# Patient Record
Sex: FEMALE | Race: WHITE | NOT HISPANIC OR LATINO | Employment: OTHER | URBAN - METROPOLITAN AREA
[De-identification: names, ages, dates, MRNs, and addresses within clinical notes are randomized per-mention and may not be internally consistent; named-entity substitution may affect disease eponyms.]

---

## 2017-02-14 ENCOUNTER — HOSPITAL ENCOUNTER (OUTPATIENT)
Dept: MAMMOGRAPHY | Facility: CLINIC | Age: 52
Discharge: HOME | End: 2017-02-14
Attending: NURSE PRACTITIONER | Admitting: NURSE PRACTITIONER
Payer: COMMERCIAL

## 2017-02-14 DIAGNOSIS — R92.8 FOLLOW-UP EXAMINATION OF ABNORMAL MAMMOGRAM: ICD-10-CM

## 2017-02-14 PROCEDURE — G0204 DX MAMMO INCL CAD BI: HCPCS

## 2017-02-16 ENCOUNTER — OFFICE VISIT (OUTPATIENT)
Dept: FAMILY MEDICINE | Facility: CLINIC | Age: 52
End: 2017-02-16
Payer: COMMERCIAL

## 2017-02-16 VITALS
DIASTOLIC BLOOD PRESSURE: 80 MMHG | BODY MASS INDEX: 30.73 KG/M2 | RESPIRATION RATE: 16 BRPM | HEART RATE: 100 BPM | TEMPERATURE: 97.9 F | SYSTOLIC BLOOD PRESSURE: 152 MMHG | WEIGHT: 180 LBS | HEIGHT: 64 IN

## 2017-02-16 DIAGNOSIS — F17.200 TOBACCO USE DISORDER: ICD-10-CM

## 2017-02-16 DIAGNOSIS — M48.062 SPINAL STENOSIS OF LUMBAR REGION WITH NEUROGENIC CLAUDICATION: ICD-10-CM

## 2017-02-16 DIAGNOSIS — G56.01 CARPAL TUNNEL SYNDROME OF RIGHT WRIST: Primary | ICD-10-CM

## 2017-02-16 PROCEDURE — 99214 OFFICE O/P EST MOD 30 MIN: CPT | Performed by: FAMILY MEDICINE

## 2017-02-16 RX ORDER — VARENICLINE TARTRATE 1 MG/1
1 TABLET, FILM COATED ORAL 2 TIMES DAILY
Qty: 56 TABLET | Refills: 2 | Status: SHIPPED | OUTPATIENT
Start: 2017-02-16 | End: 2018-06-09

## 2017-02-16 RX ORDER — OXYCODONE AND ACETAMINOPHEN 5; 325 MG/1; MG/1
TABLET ORAL
Qty: 60 TABLET | Refills: 0 | Status: ON HOLD | OUTPATIENT
Start: 2017-02-16 | End: 2017-04-11

## 2017-02-16 RX ORDER — AMOXICILLIN 500 MG/1
500 TABLET, FILM COATED ORAL 3 TIMES DAILY
COMMUNITY
End: 2017-03-29

## 2017-02-16 NOTE — PROGRESS NOTES
"  SUBJECTIVE:                                                    Shelley Lew is a 52 year old female who presents to clinic today for the following health issues:       Back Pain      Duration: Ongiong    Description (location/character/radiation): low back, pain radiating down left leg    Intensity:  6-7/10    Accompanying signs and symptoms: None    History (similar episodes/previous evaluation): MRI, previous eval    Precipitating or alleviating factors: None    Therapies tried and outcome: Tylenol     Carpal Tunnel Syndrome - Possible referral, had surgery on left wrist in 2013    S; Shelley Lew is a 52 year old female with R carpal tunnel.  Confirmed on previous EMG.  Sx worse.  Would like referral for surgery.  Did L hand, very happy with result.      Chronic back pain: worsening.  Limiting.  Hunched when walking.  Pain worse down L leg.  No acute change, just worsening over time.  MRI with many findings last year, severe spinal stenosis, etc.  Wants to talk with spine surgeon about options.  \"I can't do therapy, too much pain.     Tylenol helps a little, not a lot.  Would like temporary handicap sticker until she can figure out something to help her back more     Tobacco : wants to quit.  chantix is her hope    Problem list, med list, additional histories reviewed and updated, as indicated.    No bowel/bladder issues      O:/80 (BP Location: Right arm, Patient Position: Chair, Cuff Size: Adult Regular)  Pulse 100  Temp 97.9  F (36.6  C) (Tympanic)  Resp 16  Ht 5' 4\" (1.626 m)  Wt 180 lb (81.6 kg)  Breastfeeding? No  BMI 30.9 kg/m2  GEN: Alert and oriented, nad  Walks hunched over, slow  L leg with decreased reflex at knee  Strength down some too    A: spinal stenosis, severe       Back pain, ongoing       Carpal tunnel ,worsening       Tobacco use    P: spine surg.  Hand ortho.  6 mo handicap sticker  Chantix, discussed use  60 percocet for night use mostly.  Unclear long term plan on this.  " Told her dont' want to fill frequently, less is better, etc.

## 2017-02-16 NOTE — NURSING NOTE
"Chief Complaint   Patient presents with     Cts     Back Pain       Initial /80 (BP Location: Right arm, Patient Position: Chair, Cuff Size: Adult Regular)  Pulse 100  Temp 97.9  F (36.6  C) (Tympanic)  Resp 16  Ht 5' 4\" (1.626 m)  Wt 180 lb (81.6 kg)  Breastfeeding? No  BMI 30.9 kg/m2 Estimated body mass index is 30.9 kg/(m^2) as calculated from the following:    Height as of this encounter: 5' 4\" (1.626 m).    Weight as of this encounter: 180 lb (81.6 kg).  Medication Reconciliation: complete    Health Maintenance that is potentially due pending provider review:  Colonoscopy/FIT and Lipids    Gave pt phone number/pended order to schedule mammo and/or colonoscopy(or FIT)      "

## 2017-02-16 NOTE — MR AVS SNAPSHOT
After Visit Summary   2/16/2017    Shelley Lew    MRN: [id number]           Patient Information     Date Of Birth          1965        Visit Information        Provider Department      2/16/2017 1:20 PM Seth Pollard MD [facility]        Today's Diagnoses     Carpal tunnel syndrome of right wrist    -  1    Spinal stenosis of lumbar region with neurogenic claudication        Tobacco use disorder           Follow-ups after your visit        Additional Services     ORTHOPEDICS ADULT REFERRAL       Your provider has referred you to: JESSICA Aviles [phone]   [email or web address]    Please be aware that coverage of these services is subject to the terms and limitations of your health insurance plan.  Call member services at your health plan with any benefit or coverage questions.      Please bring the following to your appointment:    >>   Any x-rays, CTs or MRIs which have been performed.  Contact the facility where they were done to arrange for  prior to your scheduled appointment.    >>   List of current medications   >>   This referral request   >>   Any documents/labs given to you for this referral            SPINE SURGERY REFERRAL       Please choose [facility]ist (unless patient was seen by a Medical Spine Specialist within the past 6 months).  Surgical Evaluation is advised if the patient presents with one or more of the following red flags:     **Cauda Equina Syndrome  **Evidence of Spinal Tumor  **Fracture  **Infection  **Loss of Bowel or Bladder Control  **Sudden or Progressive Weakness  **Any other documented emergent neurological condition resulting from a Lumbar Spinal Condition.    You have been referred to: Spine Lumbar: Spine Surgeon: JESSICA Aviles [phone]   [email or web address]    Please be aware that coverage of  "these services is subject to the terms and limitations of your health insurance plan.  Call member services at your health plan with any benefit or coverage questions.      Please bring the following to your appointment:    **Any x-rays, CTs or MRIs which have been performed.  Contact the facility where they were done to arrange for  prior to your scheduled appointment.    **List of current medications   **This referral request   **Any documents/labs given to you regarding this referral                  Who to contact     If you have questions or need follow up information about today's clinic visit or your schedule please contact [facility] directly at [phone].  Normal or non-critical lab and imaging results will be communicated to you by MyChart, letter or phone within 4 business days after the clinic has received the results. If you do not hear from us within 7 days, please contact the clinic through FITiSThart or phone. If you have a critical or abnormal lab result, we will notify you by phone as soon as possible.  Submit refill requests through Ogone or call your pharmacy and they will forward the refill request to us. Please allow 3 business days for your refill to be completed.          Additional Information About Your Visit        FITiSTharModusly Information     Ogone gives you secure access to your electronic health record. If you see a primary care provider, you can also send messages to your care team and make appointments. If you have questions, please call your primary care clinic.  If you do not have a primary care provider, please call [phone] and they will assist you.        Care EveryWhere ID     This is your Care EveryWhere ID. This could be used by other organizations to access your [place] medical records  [id number]        Your Vitals Were     Pulse Temperature Respirations Height Breastfeeding? BMI (Body Mass Index)    100 97.9  F (36.6  C) (Tympanic) 16 5' 4\" " (1.626 m) No 30.9 kg/m2       Blood Pressure from Last 3 Encounters:   02/16/17 152/80   08/01/16 (!) 130/98   05/03/16 132/85    Weight from Last 3 Encounters:   02/16/17 180 lb (81.6 kg)   08/01/16 176 lb (79.8 kg)   05/03/16 181 lb (82.1 kg)              We Performed the Following     ORTHOPEDICS ADULT REFERRAL     SPINE SURGERY REFERRAL          Today's Medication Changes          These changes are accurate as of: 2/16/17  2:16 PM.  If you have any questions, ask your nurse or doctor.               Start taking these medicines.        Dose/Directions    oxyCODONE-acetaminophen 5-325 MG per tablet   Commonly known as:  PERCOCET   Used for:  Spinal stenosis of lumbar region with neurogenic claudication   Started by:  Seth Pollard MD        1-2 tabs at night for back pain   Quantity:  60 tablet   Refills:  0       * varenicline 0.5 MG X 11 & 1 MG X 42 tablet   Commonly known as:  CHANTIX STARTING MONTH SANJU   Used for:  Tobacco use disorder   Started by:  Seth Pollard MD        Take 0.5 mg tab daily for 3 days, then 0.5 mg tab twice daily for 4 days, then 1 mg twice daily.   Quantity:  53 tablet   Refills:  0       * varenicline 1 MG tablet   Commonly known as:  CHANTIX   Used for:  Tobacco use disorder   Started by:  Seth Pollard MD        Dose:  1 mg   Take 1 tablet (1 mg) by mouth 2 times daily   Quantity:  56 tablet   Refills:  2       * Notice:  This list has 2 medication(s) that are the same as other medications prescribed for you. Read the directions carefully, and ask your doctor or other care provider to review them with you.         Where to get your medicines      These medications were sent to [facility] [address] [place] [address] - [address] [facility] [address]  [address] [place] [address]     Phone:  [phone]     varenicline 0.5 MG X 11 & 1 MG X 42 tablet    varenicline 1 MG tablet         Some of these will need a paper prescription and others can be  bought over the counter.  Ask your nurse if you have questions.     Bring a paper prescription for each of these medications     oxyCODONE-acetaminophen 5-325 MG per tablet                Primary Care Provider Office Phone # Fax #    Seth Pollard [phone]       [facility] [address]  [facility] [address]        Thank you!     Thank you for choosing [facility]  for your care. Our goal is always to provide you with excellent care. Hearing back from our patients is one way we can continue to improve our services. Please take a few minutes to complete the written survey that you may receive in the mail after your visit with us. Thank you!             Your Updated Medication List - Protect others around you: Learn how to safely use, store and throw away your medicines at www.disposemymeds.org.          This list is accurate as of: 2/16/17  2:16 PM.  Always use your most recent med list.                   Brand Name Dispense Instructions for use    amoxicillin 500 MG tablet    AMOXIL     Take 500 mg by mouth 3 times daily       Multi-vitamin Tabs tablet      Take 1 tablet by mouth daily Reported on 2/16/2017       naproxen 500 MG tablet    NAPROSYN    60 tablet    Take 1 tablet (500 mg) by mouth 2 times daily as needed for moderate pain       oxyCODONE-acetaminophen 5-325 MG per tablet    PERCOCET    60 tablet    1-2 tabs at night for back pain       * varenicline 0.5 MG X 11 & 1 MG X 42 tablet    CHANTIX STARTING MONTH SANJU    53 tablet    Take 0.5 mg tab daily for 3 days, then 0.5 mg tab twice daily for 4 days, then 1 mg twice daily.       * varenicline 1 MG tablet    CHANTIX    56 tablet    Take 1 tablet (1 mg) by mouth 2 times daily       * Notice:  This list has 2 medication(s) that are the same as other medications prescribed for you. Read the directions carefully, and ask your doctor or other care provider to review them with you.

## 2017-03-07 ENCOUNTER — HOSPITAL ENCOUNTER (OUTPATIENT)
Dept: MRI IMAGING | Facility: CLINIC | Age: 52
Discharge: HOME | End: 2017-03-07
Attending: ORTHOPAEDIC SURGERY | Admitting: ORTHOPAEDIC SURGERY
Payer: COMMERCIAL

## 2017-03-07 DIAGNOSIS — M54.10 RADICULOPATHY, UNSPECIFIED SPINAL REGION: ICD-10-CM

## 2017-03-07 PROCEDURE — 72148 MRI LUMBAR SPINE W/O DYE: CPT

## 2017-03-20 ENCOUNTER — TRANSFERRED RECORDS (OUTPATIENT)
Dept: HEALTH INFORMATION MANAGEMENT | Facility: CLINIC | Age: 52
End: 2017-03-20

## 2017-03-29 ENCOUNTER — OFFICE VISIT (OUTPATIENT)
Dept: FAMILY MEDICINE | Facility: CLINIC | Age: 52
End: 2017-03-29
Payer: COMMERCIAL

## 2017-03-29 VITALS
HEART RATE: 100 BPM | WEIGHT: 183 LBS | HEIGHT: 64 IN | SYSTOLIC BLOOD PRESSURE: 134 MMHG | BODY MASS INDEX: 31.24 KG/M2 | TEMPERATURE: 97.6 F | DIASTOLIC BLOOD PRESSURE: 88 MMHG | RESPIRATION RATE: 16 BRPM

## 2017-03-29 DIAGNOSIS — F17.200 TOBACCO USE DISORDER: ICD-10-CM

## 2017-03-29 DIAGNOSIS — Z01.818 PREOP GENERAL PHYSICAL EXAM: Primary | ICD-10-CM

## 2017-03-29 DIAGNOSIS — M48.062 SPINAL STENOSIS OF LUMBAR REGION WITH NEUROGENIC CLAUDICATION: ICD-10-CM

## 2017-03-29 PROCEDURE — 99214 OFFICE O/P EST MOD 30 MIN: CPT | Performed by: FAMILY MEDICINE

## 2017-03-29 PROCEDURE — 93000 ELECTROCARDIOGRAM COMPLETE: CPT | Performed by: FAMILY MEDICINE

## 2017-03-29 NOTE — MR AVS SNAPSHOT
After Visit Summary   3/29/2017    Shelley Lew    MRN: [id number]           Patient Information     Date Of Birth          1965        Visit Information        Provider Department      3/29/2017 2:40 PM Seth Pollard MD [facility]        Today's Diagnoses     Preop general physical exam    -  1    Tobacco use disorder        Spinal stenosis of lumbar region with neurogenic claudication          Care Instructions      Before Your Surgery      Call your surgeon if there is any change in your health. This includes signs of a cold or flu (such as a sore throat, runny nose, cough, rash or fever).    Do not smoke, drink alcohol or take over the counter medicine (unless your surgeon or primary care doctor tells you to) for the 24 hours before and after surgery.    If you take prescribed drugs: Follow your doctor s orders about which medicines to take and which to stop until after surgery.    Eating and drinking prior to surgery: follow the instructions from your surgeon    Take a shower or bath the night before surgery. Use the soap your surgeon gave you to gently clean your skin. If you do not have soap from your surgeon, use your regular soap. Do not shave or scrub the surgery site.  Wear clean pajamas and have clean sheets on your bed.         Follow-ups after your visit        Who to contact     If you have questions or need follow up information about today's clinic visit or your schedule please contact [facility] directly at [phone].  Normal or non-critical lab and imaging results will be communicated to you by MyChart, letter or phone within 4 business days after the clinic has received the results. If you do not hear from us within 7 days, please contact the clinic through TOMODOhart or phone. If you have a critical or abnormal lab result, we will notify you by phone as soon as possible.  Submit refill requests through True Office or call your pharmacy and they  "will forward the refill request to us. Please allow 3 business days for your refill to be completed.          Additional Information About Your Visit        Produce RunharRentWiki Information     [employer]bon gives you secure access to your electronic health record. If you see a primary care provider, you can also send messages to your care team and make appointments. If you have questions, please call your primary care clinic.  If you do not have a primary care provider, please call [phone] and they will assist you.        Care EveryWhere ID     This is your Care EveryWhere ID. This could be used by other organizations to access your [place] medical records  [id number]        Your Vitals Were     Pulse Temperature Respirations Height Breastfeeding? BMI (Body Mass Index)    100 97.6  F (36.4  C) (Tympanic) 16 5' 4\" (1.626 m) No 31.41 kg/m2       Blood Pressure from Last 3 Encounters:   03/29/17 134/88   02/16/17 152/80   08/01/16 (!) 130/98    Weight from Last 3 Encounters:   03/29/17 183 lb (83 kg)   02/16/17 180 lb (81.6 kg)   08/01/16 176 lb (79.8 kg)              We Performed the Following     EKG 12-lead complete w/read - Clinics        Primary Care Provider Office Phone # Fax #    Seth Pollard [phone]       [facility] [address] [facility] [address]        Thank you!     Thank you for choosing [facility]  for your care. Our goal is always to provide you with excellent care. Hearing back from our patients is one way we can continue to improve our services. Please take a few minutes to complete the written survey that you may receive in the mail after your visit with us. Thank you!             Your Updated Medication List - Protect others around you: Learn how to safely use, store and throw away your medicines at www.disposemymeds.org.          This list is accurate as of: 3/29/17  3:53 PM.  Always use your most recent med list.                   Brand Name " Dispense Instructions for use    Multi-vitamin Tabs tablet      Take 1 tablet by mouth daily Reported on 2/16/2017       naproxen 500 MG tablet    NAPROSYN    60 tablet    Take 1 tablet (500 mg) by mouth 2 times daily as needed for moderate pain       oxyCODONE-acetaminophen 5-325 MG per tablet    PERCOCET    60 tablet    1-2 tabs at night for back pain       UNKNOWN TO PATIENT      Steroid from back specialist.       * varenicline 0.5 MG X 11 & 1 MG X 42 tablet    CHANTIX STARTING MONTH SANJU    53 tablet    Take 0.5 mg tab daily for 3 days, then 0.5 mg tab twice daily for 4 days, then 1 mg twice daily.       * varenicline 1 MG tablet    CHANTIX    56 tablet    Take 1 tablet (1 mg) by mouth 2 times daily       * Notice:  This list has 2 medication(s) that are the same as other medications prescribed for you. Read the directions carefully, and ask your doctor or other care provider to review them with you.

## 2017-03-29 NOTE — PROGRESS NOTES
[facility]  [address] [facility] [address]  [phone]  Dept: [phone]    PRE-OP EVALUATION:  Today's date: 3/29/2017    Shelley Lew (: 1965) presents for pre-operative evaluation assessment as requested by Dr. Bangura.  She requires evaluation and anesthesia risk assessment prior to undergoing surgery/procedure for treatment of lbp, sciatica .  Proposed procedure: tbd    Date of Surgery/ Procedure: TBD  Time of Surgery/ Procedure: TBD  Hospital/Surgical Facility: [place]  Fax number for surgical facility:   Primary Physician: Seth Pollard  Type of Anesthesia Anticipated: to be determined    Patient has a Health Care Directive or Living Will:  NO    1. NO - Do you have a history of heart attack, stroke, stent, bypass or surgery on an artery in the head, neck, heart or legs?  2. NO - Do you ever have any pain or discomfort in your chest?  3. NO - Do you have a history of  Heart Failure?  4. NO - Are you troubled by shortness of breath when: walking on the level, up a slight hill or at night?  5. NO - Do you currently have a cold, bronchitis or other respiratory infection?  6. YES - DO YOU HAVE A COUGH, SHORTNESS OF BREATH OR WHEEZING? Hx of smoking   7. NO - Do you sometimes get pains in the calves of your legs when you walk?  8. NO - Do you or anyone in your family have previous history of blood clots?  9. NO - Do you or does anyone in your family have a serious bleeding problem such as prolonged bleeding following surgeries or cuts?  10. NO - Have you ever had problems with anemia or been told to take iron pills?  11. NO - Have you had any abnormal blood loss such as black, tarry or bloody stools, or abnormal vaginal bleeding?  12. NO - Have you ever had a blood transfusion?  13. NO - Have you or any of your relatives ever had problems with anesthesia?  14. NO - Do you have sleep apnea, excessive snoring or daytime drowsiness?  15. NO - Do you have any prosthetic heart  valves?  16. NO - Do you have prosthetic joints?  17. NO - Is there any chance that you may be pregnant?      HPI:                                                      Brief HPI related to upcoming procedure: chronic back pain.  Having same day surgery on L4 per her report.  Unsure of exact date or name of procedure      Tobacco :only smoking one cigarette a day right now.  On chantix.  Close to completely quitting.      MEDICAL HISTORY:                                                      Patient Active Problem List    Diagnosis Date Noted     Spinal stenosis of lumbar region with neurogenic claudication 02/16/2017     Priority: Medium     Spine surg referral.  Many MRI findings.  Severely limited by this.  60 percocet given 2/16/17 visit for help with sleep, unclear on long term plan at this time.         Tobacco use disorder 02/16/2017     Priority: Medium     Health Care Home 03/31/2016     Priority: Medium       Status:  Unable to reach  Care Coordinator:  Nabila Coffman    See Letters for [facility] Care Plan  Date:  March 31, 2016           CTS (carpal tunnel syndrome) 05/15/2013     Priority: Medium     CARDIOVASCULAR SCREENING; LDL GOAL LESS THAN 160 10/31/2010     Priority: Medium        Past Surgical History:   Procedure Laterality Date     CHOLECYSTECTOMY       LAPAROSCOPY,TUBAL CAUTERY       RELEASE CARPAL TUNNEL  5/21/2013    Procedure: RELEASE CARPAL TUNNEL;  Left carpal tunnel release  ;  Surgeon: Yovani Gonzalez MD;  Location: [facility] OR     Current Outpatient Prescriptions   Medication Sig Dispense Refill     amoxicillin (AMOXIL) 500 MG tablet Take 500 mg by mouth 3 times daily       oxyCODONE-acetaminophen (PERCOCET) 5-325 MG per tablet 1-2 tabs at night for back pain 60 tablet 0     varenicline (CHANTIX STARTING MONTH PAK) 0.5 MG X 11 & 1 MG X 42 tablet Take 0.5 mg tab daily for 3 days, then 0.5 mg tab twice daily for 4 days, then 1 mg twice daily. 53 tablet 0     varenicline (CHANTIX) 1 MG tablet Take  "1 tablet (1 mg) by mouth 2 times daily 56 tablet 2     naproxen (NAPROSYN) 500 MG tablet Take 1 tablet (500 mg) by mouth 2 times daily as needed for moderate pain (Patient not taking: Reported on 2/16/2017) 60 tablet 1     multivitamin, therapeutic with minerals (MULTI-VITAMIN) TABS Take 1 tablet by mouth daily Reported on 2/16/2017       OTC products: None, except as noted above    Allergies   Allergen Reactions     Nkda [No Known Drug Allergies]       Latex Allergy: NO    Social History   Substance Use Topics     Smoking status: Current Every Day Smoker     Smokeless tobacco: Never Used      Comment: one pack lasts a week     Alcohol use Yes      Comment: OCC.     History   Drug Use No       REVIEW OF SYSTEMS:                                                    No cp or new sob.  No fever.  No gi/gu concerns.  No rash.        EXAM:                                                    /88 (BP Location: Right arm, Cuff Size: Adult Regular)  Pulse 100  Temp 97.6  F (36.4  C) (Tympanic)  Resp 16  Ht 5' 4\" (1.626 m)  Wt 183 lb (83 kg)  Breastfeeding? No  BMI 31.41 kg/m2  Gen: alert and oriented, in no acute distress, affect within normal limits  Neck: supple with no masses or nodes  Throat: oropharynx clear, no exudate or tonsillar/palate asymmetry.    CV: RRR, no murmur  Lungs: clear bilaterally with good effort  Abd: nontender, no mass  Ext: no edema or lesions   Neuro: moving all extremities, gait normal, no focal deficts noted      DIAGNOSTICS:                                                    EKG: NSR, no ST or T wave changes.  Normal axis, intervals.  No Q waves.         IMPRESSION:                                                    Pre op  Lumbar radicular pain  Mild tobacco use    Proceed.      The proposed surgical procedure is considered INTERMEDIATE risk.    REVISED CARDIAC RISK INDEX  The patient has the following serious cardiovascular risks for perioperative complications such as (MI, PE, VFib " and 3  AV Block):  No serious cardiac risks  INTERPRETATION: 0 risks: Class I (very low risk - 0.4% complication rate)    The patient has the following additional risks for perioperative complications:  No identified additional risks    No diagnosis found.    RECOMMENDATIONS:                                                          APPROVAL GIVEN to proceed with proposed procedure, without further diagnostic evaluation       Signed Electronically by: Seth Pollard MD

## 2017-03-29 NOTE — NURSING NOTE
"Chief Complaint   Patient presents with     Pre-Op Exam       Initial /88 (BP Location: Right arm, Cuff Size: Adult Regular)  Pulse 100  Temp 97.6  F (36.4  C) (Tympanic)  Resp 16  Ht 5' 4\" (1.626 m)  Wt 183 lb (83 kg)  Breastfeeding? No  BMI 31.41 kg/m2 Estimated body mass index is 31.41 kg/(m^2) as calculated from the following:    Height as of this encounter: 5' 4\" (1.626 m).    Weight as of this encounter: 183 lb (83 kg).  Medication Reconciliation: complete    Health Maintenance that is potentially due pending provider review:  Colonoscopy/FIT    Pt will schedule  appt.      "

## 2017-04-10 ENCOUNTER — ANESTHESIA EVENT (OUTPATIENT)
Dept: SURGERY | Facility: CLINIC | Age: 52
End: 2017-04-10
Payer: COMMERCIAL

## 2017-04-11 ENCOUNTER — APPOINTMENT (OUTPATIENT)
Dept: GENERAL RADIOLOGY | Facility: CLINIC | Age: 52
End: 2017-04-11
Attending: ORTHOPAEDIC SURGERY
Payer: COMMERCIAL

## 2017-04-11 ENCOUNTER — HOSPITAL ENCOUNTER (OUTPATIENT)
Facility: CLINIC | Age: 52
Discharge: HOME | End: 2017-04-11
Attending: ORTHOPAEDIC SURGERY | Admitting: ORTHOPAEDIC SURGERY
Payer: COMMERCIAL

## 2017-04-11 ENCOUNTER — ANESTHESIA (OUTPATIENT)
Dept: SURGERY | Facility: CLINIC | Age: 52
End: 2017-04-11
Payer: COMMERCIAL

## 2017-04-11 ENCOUNTER — SURGERY (OUTPATIENT)
Age: 52
End: 2017-04-11

## 2017-04-11 VITALS
DIASTOLIC BLOOD PRESSURE: 68 MMHG | OXYGEN SATURATION: 95 % | RESPIRATION RATE: 16 BRPM | WEIGHT: 183 LBS | TEMPERATURE: 96.8 F | HEIGHT: 64 IN | BODY MASS INDEX: 31.24 KG/M2 | SYSTOLIC BLOOD PRESSURE: 104 MMHG

## 2017-04-11 DIAGNOSIS — M48.062 SPINAL STENOSIS OF LUMBAR REGION WITH NEUROGENIC CLAUDICATION: Primary | ICD-10-CM

## 2017-04-11 LAB — HCG UR QL: NEGATIVE

## 2017-04-11 PROCEDURE — 37000008 ZZH ANESTHESIA TECHNICAL FEE, 1ST 30 MIN: Performed by: ORTHOPAEDIC SURGERY

## 2017-04-11 PROCEDURE — 37000009 ZZH ANESTHESIA TECHNICAL FEE, EACH ADDTL 15 MIN: Performed by: ORTHOPAEDIC SURGERY

## 2017-04-11 PROCEDURE — 27210794 ZZH OR GENERAL SUPPLY STERILE: Performed by: ORTHOPAEDIC SURGERY

## 2017-04-11 PROCEDURE — 40000940 XR LUMBAR SPINE PORT 1 VW

## 2017-04-11 PROCEDURE — 71000013 ZZH RECOVERY PHASE 1 LEVEL 1 EA ADDTL HR: Performed by: ORTHOPAEDIC SURGERY

## 2017-04-11 PROCEDURE — 25000125 ZZHC RX 250: Performed by: NURSE ANESTHETIST, CERTIFIED REGISTERED

## 2017-04-11 PROCEDURE — 81025 URINE PREGNANCY TEST: CPT | Performed by: NURSE ANESTHETIST, CERTIFIED REGISTERED

## 2017-04-11 PROCEDURE — 25800025 ZZH RX 258: Performed by: NURSE ANESTHETIST, CERTIFIED REGISTERED

## 2017-04-11 PROCEDURE — 36000063 ZZH SURGERY LEVEL 4 EA 15 ADDTL MIN: Performed by: ORTHOPAEDIC SURGERY

## 2017-04-11 PROCEDURE — 40000306 ZZH STATISTIC PRE PROC ASSESS II: Performed by: ORTHOPAEDIC SURGERY

## 2017-04-11 PROCEDURE — 36000093 ZZH SURGERY LEVEL 4 1ST 30 MIN: Performed by: ORTHOPAEDIC SURGERY

## 2017-04-11 PROCEDURE — 71000012 ZZH RECOVERY PHASE 1 LEVEL 1 FIRST HR: Performed by: ORTHOPAEDIC SURGERY

## 2017-04-11 PROCEDURE — 71000027 ZZH RECOVERY PHASE 2 EACH 15 MINS: Performed by: ORTHOPAEDIC SURGERY

## 2017-04-11 PROCEDURE — 25000125 ZZHC RX 250: Performed by: ORTHOPAEDIC SURGERY

## 2017-04-11 PROCEDURE — 27110028 ZZH OR GENERAL SUPPLY NON-STERILE: Performed by: ORTHOPAEDIC SURGERY

## 2017-04-11 PROCEDURE — 25000128 H RX IP 250 OP 636: Performed by: PHYSICIAN ASSISTANT

## 2017-04-11 PROCEDURE — 25000566 ZZH SEVOFLURANE, EA 15 MIN: Performed by: ORTHOPAEDIC SURGERY

## 2017-04-11 PROCEDURE — 25000128 H RX IP 250 OP 636: Performed by: NURSE ANESTHETIST, CERTIFIED REGISTERED

## 2017-04-11 RX ORDER — SODIUM CHLORIDE, SODIUM LACTATE, POTASSIUM CHLORIDE, CALCIUM CHLORIDE 600; 310; 30; 20 MG/100ML; MG/100ML; MG/100ML; MG/100ML
INJECTION, SOLUTION INTRAVENOUS CONTINUOUS
Status: DISCONTINUED | OUTPATIENT
Start: 2017-04-11 | End: 2017-04-11 | Stop reason: HOSPADM

## 2017-04-11 RX ORDER — FENTANYL CITRATE 50 UG/ML
INJECTION, SOLUTION INTRAMUSCULAR; INTRAVENOUS PRN
Status: DISCONTINUED | OUTPATIENT
Start: 2017-04-11 | End: 2017-04-11

## 2017-04-11 RX ORDER — LIDOCAINE 40 MG/G
CREAM TOPICAL
Status: DISCONTINUED | OUTPATIENT
Start: 2017-04-11 | End: 2017-04-11 | Stop reason: HOSPADM

## 2017-04-11 RX ORDER — METOCLOPRAMIDE HYDROCHLORIDE 5 MG/ML
10 INJECTION INTRAMUSCULAR; INTRAVENOUS EVERY 6 HOURS PRN
Status: DISCONTINUED | OUTPATIENT
Start: 2017-04-11 | End: 2017-04-11 | Stop reason: HOSPADM

## 2017-04-11 RX ORDER — ONDANSETRON 4 MG/1
4 TABLET, ORALLY DISINTEGRATING ORAL EVERY 30 MIN PRN
Status: DISCONTINUED | OUTPATIENT
Start: 2017-04-11 | End: 2017-04-11 | Stop reason: HOSPADM

## 2017-04-11 RX ORDER — KETOROLAC TROMETHAMINE 30 MG/ML
INJECTION, SOLUTION INTRAMUSCULAR; INTRAVENOUS PRN
Status: DISCONTINUED | OUTPATIENT
Start: 2017-04-11 | End: 2017-04-11

## 2017-04-11 RX ORDER — GLYCOPYRROLATE 0.2 MG/ML
INJECTION, SOLUTION INTRAMUSCULAR; INTRAVENOUS PRN
Status: DISCONTINUED | OUTPATIENT
Start: 2017-04-11 | End: 2017-04-11

## 2017-04-11 RX ORDER — FENTANYL CITRATE 50 UG/ML
25-50 INJECTION, SOLUTION INTRAMUSCULAR; INTRAVENOUS
Status: DISCONTINUED | OUTPATIENT
Start: 2017-04-11 | End: 2017-04-11 | Stop reason: HOSPADM

## 2017-04-11 RX ORDER — TRAMADOL HYDROCHLORIDE 50 MG/1
50-100 TABLET ORAL EVERY 6 HOURS PRN
Qty: 20 TABLET | Refills: 0 | Status: SHIPPED | OUTPATIENT
Start: 2017-04-11 | End: 2017-07-06

## 2017-04-11 RX ORDER — KETOROLAC TROMETHAMINE 30 MG/ML
30 INJECTION, SOLUTION INTRAMUSCULAR; INTRAVENOUS EVERY 6 HOURS PRN
Status: DISCONTINUED | OUTPATIENT
Start: 2017-04-11 | End: 2017-04-11 | Stop reason: HOSPADM

## 2017-04-11 RX ORDER — HYDROMORPHONE HYDROCHLORIDE 1 MG/ML
.3-.5 INJECTION, SOLUTION INTRAMUSCULAR; INTRAVENOUS; SUBCUTANEOUS EVERY 10 MIN PRN
Status: DISCONTINUED | OUTPATIENT
Start: 2017-04-11 | End: 2017-04-11 | Stop reason: HOSPADM

## 2017-04-11 RX ORDER — MEPERIDINE HYDROCHLORIDE 25 MG/ML
12.5 INJECTION INTRAMUSCULAR; INTRAVENOUS; SUBCUTANEOUS
Status: DISCONTINUED | OUTPATIENT
Start: 2017-04-11 | End: 2017-04-11 | Stop reason: HOSPADM

## 2017-04-11 RX ORDER — PROPOFOL 10 MG/ML
INJECTION, EMULSION INTRAVENOUS PRN
Status: DISCONTINUED | OUTPATIENT
Start: 2017-04-11 | End: 2017-04-11

## 2017-04-11 RX ORDER — NEOSTIGMINE METHYLSULFATE 1 MG/ML
VIAL (ML) INJECTION PRN
Status: DISCONTINUED | OUTPATIENT
Start: 2017-04-11 | End: 2017-04-11

## 2017-04-11 RX ORDER — LIDOCAINE HYDROCHLORIDE 10 MG/ML
INJECTION, SOLUTION INFILTRATION; PERINEURAL PRN
Status: DISCONTINUED | OUTPATIENT
Start: 2017-04-11 | End: 2017-04-11

## 2017-04-11 RX ORDER — ONDANSETRON 2 MG/ML
4 INJECTION INTRAMUSCULAR; INTRAVENOUS EVERY 30 MIN PRN
Status: DISCONTINUED | OUTPATIENT
Start: 2017-04-11 | End: 2017-04-11 | Stop reason: HOSPADM

## 2017-04-11 RX ORDER — ALBUTEROL SULFATE 0.83 MG/ML
2.5 SOLUTION RESPIRATORY (INHALATION) EVERY 4 HOURS PRN
Status: DISCONTINUED | OUTPATIENT
Start: 2017-04-11 | End: 2017-04-11 | Stop reason: HOSPADM

## 2017-04-11 RX ORDER — CEFAZOLIN SODIUM 1 G/3ML
1 INJECTION, POWDER, FOR SOLUTION INTRAMUSCULAR; INTRAVENOUS SEE ADMIN INSTRUCTIONS
Status: DISCONTINUED | OUTPATIENT
Start: 2017-04-11 | End: 2017-04-11 | Stop reason: HOSPADM

## 2017-04-11 RX ORDER — CEFAZOLIN SODIUM 2 G/100ML
2 INJECTION, SOLUTION INTRAVENOUS
Status: COMPLETED | OUTPATIENT
Start: 2017-04-11 | End: 2017-04-11

## 2017-04-11 RX ORDER — DEXAMETHASONE SODIUM PHOSPHATE 4 MG/ML
INJECTION, SOLUTION INTRA-ARTICULAR; INTRALESIONAL; INTRAMUSCULAR; INTRAVENOUS; SOFT TISSUE PRN
Status: DISCONTINUED | OUTPATIENT
Start: 2017-04-11 | End: 2017-04-11

## 2017-04-11 RX ORDER — NALOXONE HYDROCHLORIDE 0.4 MG/ML
.1-.4 INJECTION, SOLUTION INTRAMUSCULAR; INTRAVENOUS; SUBCUTANEOUS
Status: DISCONTINUED | OUTPATIENT
Start: 2017-04-11 | End: 2017-04-11 | Stop reason: HOSPADM

## 2017-04-11 RX ORDER — METOCLOPRAMIDE 10 MG/1
10 TABLET ORAL EVERY 6 HOURS PRN
Status: DISCONTINUED | OUTPATIENT
Start: 2017-04-11 | End: 2017-04-11 | Stop reason: HOSPADM

## 2017-04-11 RX ORDER — ONDANSETRON 2 MG/ML
INJECTION INTRAMUSCULAR; INTRAVENOUS PRN
Status: DISCONTINUED | OUTPATIENT
Start: 2017-04-11 | End: 2017-04-11

## 2017-04-11 RX ORDER — BUPIVACAINE HYDROCHLORIDE AND EPINEPHRINE 2.5; 5 MG/ML; UG/ML
INJECTION, SOLUTION INFILTRATION; PERINEURAL PRN
Status: DISCONTINUED | OUTPATIENT
Start: 2017-04-11 | End: 2017-04-11 | Stop reason: HOSPADM

## 2017-04-11 RX ADMIN — MIDAZOLAM 1 MG: 1 INJECTION INTRAMUSCULAR; INTRAVENOUS at 12:52

## 2017-04-11 RX ADMIN — KETOROLAC TROMETHAMINE 30 MG: 30 INJECTION, SOLUTION INTRAMUSCULAR at 11:36

## 2017-04-11 RX ADMIN — GLYCOPYRROLATE 0.6 MG: 0.2 INJECTION, SOLUTION INTRAMUSCULAR; INTRAVENOUS at 11:12

## 2017-04-11 RX ADMIN — ROCURONIUM BROMIDE 20 MG: 10 INJECTION INTRAVENOUS at 10:16

## 2017-04-11 RX ADMIN — Medication 5 MG: at 11:38

## 2017-04-11 RX ADMIN — HYDROMORPHONE HYDROCHLORIDE 0.5 MG: 1 INJECTION, SOLUTION INTRAMUSCULAR; INTRAVENOUS; SUBCUTANEOUS at 12:40

## 2017-04-11 RX ADMIN — SODIUM CHLORIDE, POTASSIUM CHLORIDE, SODIUM LACTATE AND CALCIUM CHLORIDE: 600; 310; 30; 20 INJECTION, SOLUTION INTRAVENOUS at 09:10

## 2017-04-11 RX ADMIN — MIDAZOLAM HYDROCHLORIDE 2 MG: 1 INJECTION, SOLUTION INTRAMUSCULAR; INTRAVENOUS at 09:53

## 2017-04-11 RX ADMIN — THROMBIN, TOPICAL (BOVINE) 5000 UNITS: KIT at 11:31

## 2017-04-11 RX ADMIN — SODIUM CHLORIDE, POTASSIUM CHLORIDE, SODIUM LACTATE AND CALCIUM CHLORIDE: 600; 310; 30; 20 INJECTION, SOLUTION INTRAVENOUS at 11:19

## 2017-04-11 RX ADMIN — CEFAZOLIN SODIUM 2 G: 2 INJECTION, SOLUTION INTRAVENOUS at 09:53

## 2017-04-11 RX ADMIN — BUPIVACAINE HYDROCHLORIDE AND EPINEPHRINE BITARTRATE 17 ML: 2.5; .005 INJECTION, SOLUTION INFILTRATION; PERINEURAL at 11:34

## 2017-04-11 RX ADMIN — LIDOCAINE HYDROCHLORIDE 1 ML: 10 INJECTION, SOLUTION INFILTRATION; PERINEURAL at 09:11

## 2017-04-11 RX ADMIN — LIDOCAINE HYDROCHLORIDE 50 MG: 10 INJECTION, SOLUTION INFILTRATION; PERINEURAL at 09:56

## 2017-04-11 RX ADMIN — ONDANSETRON 4 MG: 2 INJECTION INTRAMUSCULAR; INTRAVENOUS at 11:12

## 2017-04-11 RX ADMIN — GLYCOPYRROLATE 0.6 MG: 0.2 INJECTION, SOLUTION INTRAMUSCULAR; INTRAVENOUS at 11:38

## 2017-04-11 RX ADMIN — FENTANYL CITRATE 150 MCG: 50 INJECTION, SOLUTION INTRAMUSCULAR; INTRAVENOUS at 09:53

## 2017-04-11 RX ADMIN — PROPOFOL 200 MG: 10 INJECTION, EMULSION INTRAVENOUS at 09:56

## 2017-04-11 RX ADMIN — ROCURONIUM BROMIDE 30 MG: 10 INJECTION INTRAVENOUS at 09:56

## 2017-04-11 RX ADMIN — FENTANYL CITRATE 100 MCG: 50 INJECTION, SOLUTION INTRAMUSCULAR; INTRAVENOUS at 09:56

## 2017-04-11 RX ADMIN — DEXAMETHASONE SODIUM PHOSPHATE 4 MG: 4 INJECTION, SOLUTION INTRA-ARTICULAR; INTRALESIONAL; INTRAMUSCULAR; INTRAVENOUS; SOFT TISSUE at 11:12

## 2017-04-11 RX ADMIN — HYDROMORPHONE HYDROCHLORIDE 0.5 MG: 1 INJECTION, SOLUTION INTRAMUSCULAR; INTRAVENOUS; SUBCUTANEOUS at 12:28

## 2017-04-11 ASSESSMENT — LIFESTYLE VARIABLES: TOBACCO_USE: 1

## 2017-04-11 NOTE — DISCHARGE INSTRUCTIONS
Same Day Surgery Discharge Instructions  Special Precautions After Surgery - Adult    1. It is not unusual to feel lightheaded or faint, up to 24 hours after surgery or while taking pain medication.  If you have these symptoms; sit for a few minutes before standing and have someone assist you when getting up.  2. You should rest and relax for the next 24 hours and must have someone stay with you for at least 24 hours after your discharge.  3. DO NOT DRIVE any vehicle or operate mechanical equipment for 24 hours following the end of your surgery.  DO NOT DRIVE while taking narcotic pain medications that have been prescribed by your physician.  If you had a limb operated on, you must be able to use it fully to drive.  4. DO NOT drink alcoholic beverages for 24 hours following surgery or while taking prescription pain medication.  5. Drink clear liquids (apple juice, ginger ale, broth, 7-Up, etc.).  Progress to your regular diet as you feel able.  6. Any questions call your physician and do not make important decisions for 24 hours.    ACTIVITY  ? Rest today.  No activity or diet restrictions.  ? Resume activity as tolerated.  ? See printed discharge sheet.     INCISIONAL CARE  ? May remove dressing when no drainage to site.  ? May bathe / shower after 48 hours.  ? Keep incision dry for 48 hours.  ? Apply ice 1/2 hour on and 1/2 hour off for first 72 hours.  ? Be alert for signs of infection:  redness, swelling, heat, drainage of pus, and/or elevated temperature.  Contact your doctor if these occur.        Call for an appointment to return to the clinic in 10-14 days.    Medications:  ? Acetaminophen & Codeine (Tylenol #3):  Next dose: pain pill. Take as needed and stay ahead of the pain especially at bedtime. .  ? Ultram as needed for pain. Take as needed for pain after you have taken all of the tylenol #3 and still need something for pain  ? Stool softener to prevent constipation while on pain  pills.   ? Follow the instructions on the bottle.     Additional discharge instructions:call with any questions and or concerns  __________________________________________________________________________________________________________________________________  IMPORTANT NUMBERS:    [facility] [place] Main Number:  [phone], [phone]  Pharmacy:  [phone]  Same Day Surgery:  [phone], Monday - Friday until 8:30 p.m.  Urgent Care:  [phone]  Emergency Room:  [phone]      [facility]:  [phone]                                                                             [facility]:  [phone] option 1  [facility]:  [phone]     OB Clinic:  [phone]   [facility] Specialty Clinic:  [phone]   Home Medical Equipment: [phone]  [facility]:  [phone]

## 2017-04-11 NOTE — IP AVS SNAPSHOT
MRN:[id number]                      After Visit Summary   4/11/2017    Shelley Lew    MRN: [id number]           Thank you!     Thank you for choosing [facility] for your care. Our goal is always to provide you with excellent care. Hearing back from our patients is one way we can continue to improve our services. Please take a few minutes to complete the written survey that you may receive in the mail after you visit with us. Thank you!        Patient Information     Date Of Birth          1965        About your hospital stay     You were admitted on:  April 11, 2017 You last received care in the:  [facility] PreOP/Phase II    You were discharged on:  April 11, 2017        Reason for your hospital stay       Low back surgery to help to relieve back pain.                  Who to Call     For medical emergencies, please call 911.  For non-urgent questions about your medical care, please call your primary care provider or clinic, [phone]  For questions related to your surgery, please call your surgery clinic        Attending Provider     Provider Specialty    Nehemias Bangura MD Orthopedics       Primary Care Provider Office Phone # Fax #    Seth Pollard [phone]       [facility] [address] [facility] [address]        After Care Instructions     Activity       Your activity upon discharge: activity as tolerated. No lifting over 10 pounds maximum for 6 weeks.            Diet       Follow this diet upon discharge: Normal            Wound care and dressings       Instructions to care for your wound at home: ice to area for comfort. Keep covered with sterile dressing until all drainage stops. Leave steri strips intact until follow up. Do not soak incision until all scab is gone. Shower OK after 48 hours, blot incision dry. No lotions, ointments or salves to incision.                  Further instructions from your care team                            Same Day Surgery Discharge Instructions  Special Precautions After Surgery - Adult    1. It is not unusual to feel lightheaded or faint, up to 24 hours after surgery or while taking pain medication.  If you have these symptoms; sit for a few minutes before standing and have someone assist you when getting up.  2. You should rest and relax for the next 24 hours and must have someone stay with you for at least 24 hours after your discharge.  3. DO NOT DRIVE any vehicle or operate mechanical equipment for 24 hours following the end of your surgery.  DO NOT DRIVE while taking narcotic pain medications that have been prescribed by your physician.  If you had a limb operated on, you must be able to use it fully to drive.  4. DO NOT drink alcoholic beverages for 24 hours following surgery or while taking prescription pain medication.  5. Drink clear liquids (apple juice, ginger ale, broth, 7-Up, etc.).  Progress to your regular diet as you feel able.  6. Any questions call your physician and do not make important decisions for 24 hours.    ACTIVITY  ? Rest today.  No activity or diet restrictions.  ? Resume activity as tolerated.  ? See printed discharge sheet.     INCISIONAL CARE  ? May remove dressing when no drainage to site.  ? May bathe / shower after 48 hours.  ? Keep incision dry for 48 hours.  ? Apply ice 1/2 hour on and 1/2 hour off for first 72 hours.  ? Be alert for signs of infection:  redness, swelling, heat, drainage of pus, and/or elevated temperature.  Contact your doctor if these occur.        Call for an appointment to return to the clinic in 10-14 days.    Medications:  ? Acetaminophen & Codeine (Tylenol #3):  Next dose: pain pill. Take as needed and stay ahead of the pain especially at bedtime. .  ? Ultram as needed for pain. Take as needed for pain after you have taken all of the tylenol #3 and still need something for pain  ? Stool softener to prevent constipation while on pain pills.  "  ? Follow the instructions on the bottle.     Additional discharge instructions:call with any questions and or concerns  __________________________________________________________________________________________________________________________________  IMPORTANT NUMBERS:    [facility] [place]:  [phone], [phone]  Pharmacy:  [phone]  Same Day Surgery:  [phone], Monday - Friday until 8:30 p.m.  Urgent Care:  [phone]  Emergency Room:  [phone]      [facility]:  [phone]                                                                             [facility]:  [phone] option 1  [facility]:  [phone]     OB Clinic:  [phone]   Surgery Specialty Clinic:  [phone]   Home Medical Equipment: [phone]  [facility]:  [phone]        Pending Results     Date and Time Order Name Status Description    4/11/2017 0706 XR Lumbar Spine Port 1 View In process             Admission Information     Date & Time Provider Department Dept. Phone    4/11/2017 Nehemias Bangura MD [facility] PreOP/Phase [phone]      Your Vitals Were     Blood Pressure Temperature Respirations Height Weight Pulse Oximetry    106/78 96.8  F (36  C) (Oral) 16 1.626 m (5' 4\") 83 kg (183 lb) 96%    BMI (Body Mass Index)                   31.41 kg/m2           MyChart Information     [facility] gives you secure access to your electronic health record. If you see a primary care provider, you can also send messages to your care team and make appointments. If you have questions, please call your primary care clinic.  If you do not have a primary care provider, please call [phone] and they will assist you.        Care EveryWhere ID     This is your Care EveryWhere ID. This could be used by other organizations to access your [facility] medical records  [id number]           Review of your medicines      START taking        Dose / Directions    " acetaminophen-codeine 300-30 MG per tablet   Commonly known as:  TYLENOL #3   Used for:  Spinal stenosis of lumbar region with neurogenic claudication        Dose:  1-2 tablet   Take 1-2 tablets by mouth every 4 hours as needed for pain maximum 8 tablet(s) per day   Quantity:  30 tablet   Refills:  0       traMADol 50 MG tablet   Commonly known as:  ULTRAM   Used for:  Spinal stenosis of lumbar region with neurogenic claudication        Dose:   mg   Take 1-2 tablets ( mg) by mouth every 6 hours as needed for pain maximum 8 tablet(s) per day. To be taken after Tylenol #3 tablets are gone   Quantity:  20 tablet   Refills:  0         CONTINUE these medicines which have NOT CHANGED        Dose / Directions    Multi-vitamin Tabs tablet        Dose:  1 tablet   Take 1 tablet by mouth daily Reported on 2/16/2017   Refills:  0       naproxen 500 MG tablet   Commonly known as:  NAPROSYN   Used for:  Hip pain, right        Dose:  500 mg   Take 1 tablet (500 mg) by mouth 2 times daily as needed for moderate pain   Quantity:  60 tablet   Refills:  1       * varenicline 0.5 MG X 11 & 1 MG X 42 tablet   Commonly known as:  CHANTIX STARTING MONTH SANJU   Used for:  Tobacco use disorder        Take 0.5 mg tab daily for 3 days, then 0.5 mg tab twice daily for 4 days, then 1 mg twice daily.   Quantity:  53 tablet   Refills:  0       * varenicline 1 MG tablet   Commonly known as:  CHANTIX   Used for:  Tobacco use disorder        Dose:  1 mg   Take 1 tablet (1 mg) by mouth 2 times daily   Quantity:  56 tablet   Refills:  2       * Notice:  This list has 2 medication(s) that are the same as other medications prescribed for you. Read the directions carefully, and ask your doctor or other care provider to review them with you.      STOP taking     oxyCODONE-acetaminophen 5-325 MG per tablet   Commonly known as:  PERCOCET           TYLENOL PO           UNKNOWN TO PATIENT                Where to get your medicines      Some of  these will need a paper prescription and others can be bought over the counter. Ask your nurse if you have questions.     Bring a paper prescription for each of these medications     acetaminophen-codeine 300-30 MG per tablet    traMADol 50 MG tablet                Protect others around you: Learn how to safely use, store and throw away your medicines at www.disposemymeds.org.             Medication List: This is a list of all your medications and when to take them. Check marks below indicate your daily home schedule. Keep this list as a reference.      Medications           Morning Afternoon Evening Bedtime As Needed    acetaminophen-codeine 300-30 MG per tablet   Commonly known as:  TYLENOL #3   Take 1-2 tablets by mouth every 4 hours as needed for pain maximum 8 tablet(s) per day                                Multi-vitamin Tabs tablet   Take 1 tablet by mouth daily Reported on 2/16/2017                                naproxen 500 MG tablet   Commonly known as:  NAPROSYN   Take 1 tablet (500 mg) by mouth 2 times daily as needed for moderate pain                                traMADol 50 MG tablet   Commonly known as:  ULTRAM   Take 1-2 tablets ( mg) by mouth every 6 hours as needed for pain maximum 8 tablet(s) per day. To be taken after Tylenol #3 tablets are gone                                * varenicline 0.5 MG X 11 & 1 MG X 42 tablet   Commonly known as:  CHANTIX STARTING MONTH SANJU   Take 0.5 mg tab daily for 3 days, then 0.5 mg tab twice daily for 4 days, then 1 mg twice daily.                                * varenicline 1 MG tablet   Commonly known as:  CHANTIX   Take 1 tablet (1 mg) by mouth 2 times daily                                * Notice:  This list has 2 medication(s) that are the same as other medications prescribed for you. Read the directions carefully, and ask your doctor or other care provider to review them with you.

## 2017-04-11 NOTE — BRIEF OP NOTE
[facility]  Brief Operative Note      Pre-operative diagnosis: Lumbar spinal Stenosis, herniated nucleus pulposis, radiculopathy   Post-operative diagnosis: Same   Procedure: L3-4 bilateral hemilaminotomies, L4-5 left Laminotomy, disc excision   Surgeon: Nehemias Bangura MD     Assistant(s): Ravin Noguera PA-C   Anesthesia: General endotracheal anesthesia   Estimated blood loss: Less than 50 ml               Drains: None   Specimens: None       Findings: See full dictated operative note for details   Complications: None                   Comments: See dictated operative report for full details     Condition: Stable   Weight bearing status: Weight bearing as tolerated   Activity: Activity as tolerated  Patient may move about with assist as indicated or with supervision   Anticoagulation plan:                 Mechanical and/or ambulation    Follow up plan                           Follow up in 2 week(s) with [facility] surgical team

## 2017-04-11 NOTE — ANESTHESIA POSTPROCEDURE EVALUATION
Patient: Shelley Lew    Procedure(s):  Lumbar 3-4 & Lumbar 4-5 Laminectomy & Disc Excision - Wound Class: I-Clean    Diagnosis:Stenosis, herniated nucleus pulposis, radiculopathy  Diagnosis Additional Information: No value filed.    Anesthesia Type:  General, ETT    Note:  Anesthesia Post Evaluation    Patient location during evaluation: Bedside  Patient participation: Able to fully participate in evaluation  Level of consciousness: awake and alert  Pain management: adequate  Airway patency: patent  Cardiovascular status: acceptable  Respiratory status: acceptable  Hydration status: acceptable  PONV: none     Anesthetic complications: None          Last vitals:  Vitals:    04/11/17 0837   BP: 141/81   Resp: 20   Temp: 36.3  C (97.3  F)   SpO2: 98%         Electronically Signed By: Elmira Alfonso CRNA, APRN CRNA  April 11, 2017  11:54 AM

## 2017-04-11 NOTE — OR NURSING
To sds. Drowsy but states shes hungry. Not awake enough to eat. Boyfriend at bedside and will get prescriptions for pt postop.

## 2017-04-11 NOTE — ANESTHESIA PREPROCEDURE EVALUATION
Anesthesia Evaluation     . Pt has had prior anesthetic.     History of anesthetic complications   - PONV        ROS/MED HX    ENT/Pulmonary:     (+)tobacco use, Current use , . .    Neurologic:       Cardiovascular:         METS/Exercise Tolerance:     Hematologic:         Musculoskeletal:         GI/Hepatic:         Renal/Genitourinary:         Endo:         Psychiatric:         Infectious Disease:         Malignancy:         Other:                     Physical Exam  Normal systems: cardiovascular, pulmonary and dental    Airway   Mallampati: II  TM distance: >3 FB  Neck ROM: full    Dental     Cardiovascular       Pulmonary                     Anesthesia Plan      History & Physical Review  History and physical reviewed and following examination; no interval change.    ASA Status:  2 .    NPO Status:  > 8 hours    Plan for General and ETT with Intravenous induction. Maintenance will be Balanced.    PONV prophylaxis:  Ondansetron (or other 5HT-3) and Dexamethasone or Solumedrol  Additional equipment: Videolaryngoscope      Postoperative Care  Postoperative pain management:  IV analgesics and Oral pain medications.      Consents  Anesthetic plan, risks, benefits and alternatives discussed with:  Patient..                          .

## 2017-04-11 NOTE — IP AVS SNAPSHOT
[facility] PreOP/Phase II    [address] [facility] [address]    Phone:  [phone]    Fax:  [phone]                                       After Visit Summary   4/11/2017    Shelley Lew    MRN: [id number]           After Visit Summary Signature Page     I have received my discharge instructions, and my questions have been answered. I have discussed any challenges I see with this plan with the nurse or doctor.    ..........................................................................................................................................  Patient/Patient Representative Signature      ..........................................................................................................................................  Patient Representative Print Name and Relationship to Patient    ..................................................               ................................................  Date                                            Time    ..........................................................................................................................................  Reviewed by Signature/Title    ...................................................              ..............................................  Date                                                            Time

## 2017-04-11 NOTE — ANESTHESIA CARE TRANSFER NOTE
Patient: Shelley Lew    Procedure(s):  Lumbar 3-4 & Lumbar 4-5 Laminectomy & Disc Excision - Wound Class: I-Clean    Diagnosis: Stenosis, herniated nucleus pulposis, radiculopathy  Diagnosis Additional Information: No value filed.    Anesthesia Type:   General, ETT     Note:  Airway :Nasal Cannula  Patient transferred to:Phase II        Vitals: (Last set prior to Anesthesia Care Transfer)    CRNA VITALS  4/11/2017 1118 - 4/11/2017 1154      4/11/2017             Pulse: 103    SpO2: 99 %    Resp Rate (observed): 13                Electronically Signed By: Elmira Alfonso CRNA, APRN CRNA  April 11, 2017  11:54 AM

## 2017-04-11 NOTE — OR NURSING
Belches and no further nausea. Vss. Iv dc. Bimal called and here to  . Instructions given to pt and to pt boyfriend. roberta d/i. gregory activity well. gregory ice and no further c/o.

## 2017-04-12 NOTE — OP NOTE
DATE OF PROCEDURE:  04/11/2017      PREOPERATIVE DIAGNOSES:   1.  Lumbar spinal stenosis with neurogenic claudication, level L3-4.   2.  Left leg radiculopathy secondary to a foraminal L4-L5 disk herniation.   3.  History of chronic regional back pain due to multilevel degenerative disk disease.      POSTOPERATIVE DIAGNOSES:     1.  Lumbar spinal stenosis with neurogenic claudication, level L3-4.   2.  Left leg radiculopathy secondary to a foraminal L4-L5 disk herniation.   3.  History of chronic regional back pain due to multilevel degenerative disk disease.      PROCEDURES:   1.  Bilateral fenestration type decompression laminotomies level L3-4 with medial facetectomy and foraminotomies.   2.  Left-sided L4-L5 laminotomy, foraminotomy and foraminal disk excision.   3.  Use of operative microscopy.      SURGEON:  Nehemias Bangura MD      ASSISTANT:  Flo Noguera PA-C  Specialty PA services are required for patient positioning, soft tissue retraction, instrumentation, and patient safety.      CLINICAL SUMMARY:  Ms. Shelley Lew is a 52-year-old female with a history of progressive walking impairments consistent with neurogenic claudication.  Her diagnostic MRI scan shows severe central canal stenosis at L3-4 due mostly to facet enlargement and medial spurring but also a contributory broadly broad-based disk protrusion.  There is also foraminal disk herniation affecting the left L4 nerve root within the L4-L5 foramen.  This correlated well with a history of L4 dermatomal pain as well.  The L2-L3 level showed evidence of moderate central canal stenosis that was not thought to be contributory to her neurogenic claudication syndrome.  After failure of nonsurgical care, she is here for an elective decompression procedure, focusing on the L3-4 and L4-5 levels.  I again reviewed the indications, general and specific risks, benefits and rehabilitation issues.  She appears to understand these issues and expresses her  consent to proceed.      PROCEDURES  PERFORMED:  After satisfactory general anesthesia, the patient was placed prone onto the Jesse frame.  The back was prepped and draped in the usual sterile manner.  Timeout protocols are observed.    Midline incision was made at the anticipated level.  Marcaine with epinephrine was infiltrated locally and then further soft tissue dissection was done to expose the posterior bony elements.  The first facet joint encountered is marked using a micro curet and confirmed by cross-table x-ray to be the L3-4 facet level.      Further dissection exposes the L3-4 level bilaterally.  Dissection was extending caudally to expose the left L4-L5 interlaminar level as well.      Next, while using a microscope for visualization, fenestration type decompression laminotomies were done bilaterally at L3-4.  Partial medial facetectomies were also done and accomplished by using a high speed Midas Uziel drill as well as micro curets and Kerrison rongeurs.  Findings are noteworthy for significant redundant ligamentum flavum as well as medial facet spurring contributing mostly to the central and subarticular stenosis.  The L3 lamina was undercut thus improving canal dimensions as well and ligamentum flavum removed across the midline as well but leaving intact the spinous process and interspinous ligament at L3-4.  A good central and lateral decompression was accomplished.  The disk space is inspected and seen to be generally degenerative and protruding, but no longer contributing to significant nerve impingement and therefore left intact.      Attention now directed towards the left L4-L5 laminotomy.  This was performed in a similar manner, performing a medial facetectomy and exposing the common dural sac, the L5 nerve root and the L4-L5 foramen.  Foraminotomy Kerrisons were used to improve the bony dimensions.  Generalized contained disk bulge is encountered within the foraminal space.  Through an  annulotomy loose degenerative nuclear and annular material was removed for a good decompression.  All 3 laminotomy sites were then copiously irrigated and inspected and found to be satisfactory.  A small amount of Marcaine with epinephrine was left in each laminotomy site for postoperative pain relief purposes.  The wound was closed in a layered manner using Vicryl suture at the paraspinal fascia, subq and skin.  The patient appeared to tolerate the procedure well and arrived in recovery room in satisfactory condition.      Outpatient protocol is to be utilized for safe home discharge planned for today.         YEYO GARRETT MD             D: 2017 11:55   T: 2017 22:28   MT: MADELINE#[id number]      Name:     DARWIN SMITH   MRN:      [id number]        Account:        [id number]   :      1965           Procedure Date: 2017      Document: [id number]

## 2017-05-22 ENCOUNTER — MYC REFILL (OUTPATIENT)
Dept: FAMILY MEDICINE | Facility: CLINIC | Age: 52
End: 2017-05-22

## 2017-05-22 DIAGNOSIS — M25.551 HIP PAIN, RIGHT: ICD-10-CM

## 2017-05-23 ENCOUNTER — OFFICE VISIT (OUTPATIENT)
Dept: ORTHOPEDICS | Facility: CLINIC | Age: 52
End: 2017-05-23
Payer: COMMERCIAL

## 2017-05-23 VITALS
WEIGHT: 183 LBS | DIASTOLIC BLOOD PRESSURE: 82 MMHG | SYSTOLIC BLOOD PRESSURE: 124 MMHG | BODY MASS INDEX: 31.24 KG/M2 | HEIGHT: 64 IN

## 2017-05-23 DIAGNOSIS — G56.01 RIGHT CARPAL TUNNEL SYNDROME: Primary | ICD-10-CM

## 2017-05-23 PROCEDURE — 99203 OFFICE O/P NEW LOW 30 MIN: CPT | Performed by: FAMILY MEDICINE

## 2017-05-23 NOTE — PROGRESS NOTES
"Shelley Lew  :  1965  DOS: 2017  MRN: [id number]    Sports Medicine Clinic Visit    PCP: Seth Pollard.    Shelley Lew is a 52 year old Right hand dominant female who is seen as a self referral presenting with right hand numbness/tingling.    Injury: Chronic right hand numbness/tingling, carpal tunnel complaints over the 4 - 5 years, worse over last 6 months.  Pain located over right hand, all fingers, nonradiating.  Additional Features:  Positive: numbness and weakness.  Symptoms are better with Nothing.  Symptoms are worse with: waking in AM, gripping/grapsing objects, lifting.  Other evaluation and/or treatments so far consists of: Ibuprofen, Rest and wrist braces.  Uses braces mostly at night.  Recent imaging completed: EMG completed in , noted moderate CTS at that time per patient.  Prior History of related problems: H/o left carpal tunnel surgery in  by Dr Gonzalez @ [facility].      Social History: unemployed    Review of Systems  Musculoskeletal: as above  Remainder of review of systems is negative including constitutional, CV, pulmonary, GI, Skin and Neurologic except as noted in HPI or medical history.    Past Medical History:   Diagnosis Date      (normal spontaneous vaginal delivery)     X2     PONV (postoperative nausea and vomiting)      Pyelonephritis, acute      Past Surgical History:   Procedure Laterality Date     CHOLECYSTECTOMY       LAMINECTOMY LUMBAR POSTERIOR MICROSCOPIC TWO LEVELS N/A 2017    Procedure: LAMINECTOMY LUMBAR POSTERIOR MICROSCOPIC TWO LEVELS;  Surgeon: Nehemias Bangura MD;  Location: [facility] OR     LAPAROSCOPY,TUBAL CAUTERY       RELEASE CARPAL TUNNEL  2013    Procedure: RELEASE CARPAL TUNNEL;  Left carpal tunnel release  ;  Surgeon: Yovani Gonzalez MD;  Location: [facility] OR       Objective  /82  Ht 5' 4\" (1.626 m)  Wt 183 lb (83 kg)  BMI 31.41 kg/m2    General: healthy, alert and in no distress    HEENT: no scleral icterus or " conjunctival erythema   Skin: no suspicious lesions or rash. No jaundice.   CV: regular rhythm by palpation, 2+ distal pulses, no pedal edema    Resp: normal respiratory effort without conversational dyspnea   Psych: normal mood and affect    Gait: nonantalgic, appropriate coordination and balance   Neuro: normal light touch sensory exam of the extremities. Motor strength as noted below     Right Wrist and Hand exam    Inspection:       No swelling, bruising or deformity bilateral    Tender:       Mild over flexor retinaculum and flexor muscles of forearm    Non Tender:       Remainder of the Wrist and Hand right,      anatomic snuffbox right,      scapholunate interval right and      TFCC right    ROM:       Full and symmetric active and passive range of motion of the forearm, wrist and digits bilateral and      Pain with passive flexion and extension on the right     Strength:       5/5 strength in the muscles of the hand, wrist and forearm bilateral    Special Tests:        positive (+) Tinel's test right,       positive (+) Phatlen's test right,       neg (-) TFCC compression test bilateral and       neg (-) Finkelstein's maneuver bilateral    Neurovascular:       2+ radial pulses bilaterally with brisk capillary refill,      normal sensation to light touch in the radial, median and ulnar nerve distributions and      abnormal sensation with CTS testing as above    Bilateral Elbow exam:    Full strength and ROM without laxity or pain    Radiology:  None performed today    Assessment:  1. Right carpal tunnel syndrome        Plan:  Discussed the assessment with the patient.  Follow up: prn  Discussed value of EMG, agree to defer for now  Offered CSI under US guidance, but with somewhat limited expectations given the severity and duration of sx  She had a great result from her left wrist CTS surgery with Dr Gonzalez of [facility]  Will refer back to him for consult on R   OT options reviewed  Bracing and general  conservative care reviewed  Low suspicion of more proximal nerve issue, EMG could clarify if needed  Home handouts provided and supportive care reviewed  All questions were answered today  Contact us with additional questions or concerns  Signs and sx of concern reviewed      Rancho Hernandez DO, [place]  Primary Care Sports Medicine  [facility]             Disclaimer: This note consists of symbols derived from keyboarding, dictation and/or voice recognition software. As a result, there may be errors in the script that have gone undetected. Please consider this when interpreting information found in this chart.

## 2017-05-23 NOTE — MR AVS SNAPSHOT
After Visit Summary   5/23/2017    Shelley Lew    MRN: [id number]           Patient Information     Date Of Birth          1965        Visit Information        Provider Department      5/23/2017 3:00 PM Rancho Hernandez DO [facility]        Today's Diagnoses     Right carpal tunnel syndrome    -  1       Follow-ups after your visit        Additional Services     ORTHO  REFERRAL       [facility] is referring you to the Orthopedic  Services at [facility].       The  Representative will assist you in the coordination of your Orthopedic and Musculoskeletal Care as prescribed by your physician.    The  Representative will call you within 1 business day to help schedule your appointment, or you may contact the  Representative at:    All areas ~ [phone]     Type of Referral : Surgical / Specialist, Dr Gerardo Gonzalez      Timeframe requested: Within 2 weeks    Coverage of these services is subject to the terms and limitations of your health insurance plan.  Please call member services at your health plan with any benefit or coverage questions.      If X-rays, CT or MRI's have been performed, please contact the facility where they were done to arrange for , prior to your scheduled appointment.  Please bring this referral request to your appointment and present it to your specialist.                  Who to contact     If you have questions or need follow up information about today's clinic visit or your schedule please contact [facility] directly at [phone].  Normal or non-critical lab and imaging results will be communicated to you by MyChart, letter or phone within 4 business days after the clinic has received the results. If you do not hear from us within 7 days, please contact the clinic through MyChart or phone. If you have  "a critical or abnormal lab result, we will notify you by phone as soon as possible.  Submit refill requests through [employer] or call your pharmacy and they will forward the refill request to us. Please allow 3 business days for your refill to be completed.          Additional Information About Your Visit        [employer] Information     [employer] gives you secure access to your electronic health record. If you see a primary care provider, you can also send messages to your care team and make appointments. If you have questions, please call your primary care clinic.  If you do not have a primary care provider, please call [phone] and they will assist you.        Care EveryWhere ID     This is your Care EveryWhere ID. This could be used by other organizations to access your [facility] medical records  [id number]        Your Vitals Were     Height BMI (Body Mass Index)                5' 4\" (1.626 m) 31.41 kg/m2           Blood Pressure from Last 3 Encounters:   05/23/17 124/82   04/11/17 104/68   03/29/17 134/88    Weight from Last 3 Encounters:   05/23/17 183 lb (83 kg)   04/11/17 183 lb (83 kg)   03/29/17 183 lb (83 kg)              We Performed the Following     ORTHO  REFERRAL        Primary Care Provider Office Phone # Fax #    Seth Pollard [phone]       [facility] [address] [place] [id number]        Thank you!     Thank you for choosing [facility]  for your care. Our goal is always to provide you with excellent care. Hearing back from our patients is one way we can continue to improve our services. Please take a few minutes to complete the written survey that you may receive in the mail after your visit with us. Thank you!             Your Updated Medication List - Protect others around you: Learn how to safely use, store and throw away your medicines at www.disposemymeds.org.          This list is accurate as of: " 5/23/17 11:59 PM.  Always use your most recent med list.                   Brand Name Dispense Instructions for use    acetaminophen-codeine 300-30 MG per tablet    TYLENOL #3    30 tablet    Take 1-2 tablets by mouth every 4 hours as needed for pain maximum 8 tablet(s) per day       Multi-vitamin Tabs tablet      Take 1 tablet by mouth daily Reported on 5/23/2017       naproxen 500 MG tablet    NAPROSYN    60 tablet    Take 1 tablet (500 mg) by mouth 2 times daily as needed for moderate pain       traMADol 50 MG tablet    ULTRAM    20 tablet    Take 1-2 tablets ( mg) by mouth every 6 hours as needed for pain maximum 8 tablet(s) per day. To be taken after Tylenol #3 tablets are gone       * varenicline 0.5 MG X 11 & 1 MG X 42 tablet    CHANTIX STARTING MONTH SANJU    53 tablet    Take 0.5 mg tab daily for 3 days, then 0.5 mg tab twice daily for 4 days, then 1 mg twice daily.       * varenicline 1 MG tablet    CHANTIX    56 tablet    Take 1 tablet (1 mg) by mouth 2 times daily       * Notice:  This list has 2 medication(s) that are the same as other medications prescribed for you. Read the directions carefully, and ask your doctor or other care provider to review them with you.

## 2017-05-23 NOTE — NURSING NOTE
"Chief Complaint   Patient presents with     Musculoskeletal Problem     right hand numbness/tingling       Initial /82  Ht 5' 4\" (1.626 m)  Wt 183 lb (83 kg)  BMI 31.41 kg/m2 Estimated body mass index is 31.41 kg/(m^2) as calculated from the following:    Height as of this encounter: 5' 4\" (1.626 m).    Weight as of this encounter: 183 lb (83 kg).  Medication Reconciliation: complete     Esteban Bustillos ATC  "

## 2017-05-24 RX ORDER — NAPROXEN 500 MG/1
500 TABLET ORAL 2 TIMES DAILY PRN
Qty: 60 TABLET | Refills: 1 | Status: SHIPPED
Start: 2017-05-24 | End: 2017-07-06

## 2017-05-24 NOTE — TELEPHONE ENCOUNTER
Pt not currently taking as of 2-16-17.  Corinna Alexis RN    naproxen (NAPROSYN) 500 MG tablet 60 tablet 1 3/24/2016  --      Sig: Take 1 tablet (500 mg) by mouth 2 times daily as needed for moderate pain     Patient not taking: Reported on 2/16/2017          Class: E-Prescribe     Route: Oral     Order: [id number]

## 2017-05-24 NOTE — TELEPHONE ENCOUNTER
Message from [employer]:  Original authorizing provider: AVERY Nunez would like a refill of the following medications:  naproxen (NAPROSYN) 500 MG tablet [Angela Melgar NP]    Preferred pharmacy: [facility] [address] [place] [address] AT [facility] [address]    Comment:

## 2017-06-12 ENCOUNTER — TELEPHONE (OUTPATIENT)
Dept: CARE COORDINATION | Facility: CLINIC | Age: 52
End: 2017-06-12

## 2017-06-13 ENCOUNTER — CARE COORDINATION (OUTPATIENT)
Dept: CARE COORDINATION | Facility: CLINIC | Age: 52
End: 2017-06-13

## 2017-06-13 NOTE — LETTER
Health Care Home - Access Care Plan    About Me  Patient Name:  Shelley Lew    YOB: 1965  Age:                            52 year old   [place] MRN:         [id number] Telephone Information:     Home Phone [phone]   Mobile [phone]       Address:    [address]  [place] [address] Email address:  [email or web address]      Emergency Contact(s)  Name Relationship Lgl Grd Work Phone Home Phone Mobile Phone   1. JIMMY LONDON Friend   [phone]   2. NONE,PER CARROL*        3. HARLEY KELSEY Mother   [phone]              Health Maintenance: Routine Health maintenance Reviewed: Due/Overdue   Health Maintenance Due   Topic Date Due     HEPATITIS C SCREENING  02/09/1983     LIPID SCREEN Q5 YR FEMALE (SYSTEM ASSIGNED)  02/09/2010     COLON CANCER SCREEN (SYSTEM ASSIGNED)  02/09/2015         My Access Plan  Medical Emergency 911   Questions or concerns during clinic hours Primary Clinic Line, I will call the clinic directly: Primary Clinic: [facility]- [phone]   24 Hour Appointment Line [phone] or  [phone])  (toll free)   24 Hour Nurse Line [phone] (toll free)   Questions or concerns outside clinic hours 24 Hour Appointment Line, I will call the after-hours on-call line:   [facility] [phone] or [phone]) (toll-free)   Preferred Urgent Care Preferred Urgent Care: Other   Preferred Hospital Preferred Hospital: [facility] [place]  [phone]   Preferred Pharmacy [facility] [place] - [address]     Behavioral Health Crisis Line [facility], [phone] or 911     My Care Team Members  Patient Care Team       Relationship Specialty Notifications Start End    Seth Pollard MD PCP - General Family Practice  8/15/16     Phone: [phone] Fax: [phone]         [facility] [address] [facility] [address]    Angela Whitfield, RN Clinic Care Coordinator  Admissions 6/13/17     Phone: [phone] Fax: [phone]            My Medical and Care Information  Problem List   Patient Active Problem List   Diagnosis     CARDIOVASCULAR SCREENING; LDL GOAL LESS THAN 160     CTS (carpal tunnel syndrome)     Health Care Home     Spinal stenosis of lumbar region with neurogenic claudication     Tobacco use disorder      Current Medications and Allergies:  See printed Medication Report

## 2017-06-13 NOTE — LETTER
[facility]  [address] [place]  [address]        June 13, 2017      Shelley Lew  [address] [facility] [address]  [place] [address]    Dear Shelley,  I am the Clinic Care Coordinator that works with your primary care provider's clinic. I have been trying to reach you recently to introduce Clinic Care Coordination and to see if there was anything I could assist you with.  Below is a description of what Clinic Care Coordination is and how I can further assist you.     The Clinic Care Coordinator role is a Registered Nurse and/or  who understands the health care system. The goal of Clinic Care Coordination is to help you manage your health and improve access to the [facility] in the most efficient manner.  The Registered Nurse can assist you in meeting your health care goals by providing education, coordinating services, and strengthening the communication among your providers. The  can assist you with financial, behavioral, psychosocial, and chemical dependency and counseling/psychiatric resources.    Please feel free to keep this letter and contact information to contact me at [phone] with any further questions or concerns that may arise. We at [facility] are focused on providing you with the highest-quality healthcare experience possible and that all starts with you.       Sincerely,     Angela Whtifield    Enclosed: I have enclosed a copy of a 24 Hour Access Plan. This has helpful phone numbers for you to call when needed. Please keep this in an easy to access place to use as needed.

## 2017-06-13 NOTE — PROGRESS NOTES
Clinic Care Coordination Contact  [facility]/Voicemail    Referral Source: Non-[facility]    Clinical Data: Care Coordinator Outreach, Admitted to [facility] 6/6/17-6/10/17 for Pyelonephritis. She was discharged to home with Keflex 500mg 3x/day for 14 days and Cipro, lactobacillus 1 pill with each meal until done with antibiotic. F/u with PCP 6/14/17. History of using Meth, was discussed in hospital but she refused to see counselor per notes in Care Everywhere.    Outreach attempted x 1.  Left message on voicemail with call back information and requested return call.    Plan: Care Coordinator will mail out care coordination introduction letter with care coordinator contact information and explanation of care coordination services. Care Coordinator will try to reach patient again in 1-2 business days.    Angela Whitfield RN, [facility]  RN Care Coordinator  [facility], [facility]  Phone # [phone]  6/13/2017 3:23 PM

## 2017-06-14 NOTE — PROGRESS NOTES
Clinic Care Coordination Contact  [facility]/Voicemail    Referral Source: Non-[facility]    Clinical Data: Care Coordinator Outreach, Admitted to [facility] 6/6/17-6/10/17 for Pyelonephritis. She was discharged to home with Keflex 500mg 3x/day for 14 days and Cipro, lactobacillus 1 pill with each meal until done with antibiotic. F/u with PCP 6/14/17. History of using Meth, was discussed in hospital but she refused to see counselor per notes in Care Everywhere.       Outreach attempted x 2.  However voicemail box was full, unable to leave a message. Patient cancelled her hospital follow up today with PCP.    Plan: Care Coordinator mailed out care coordination introduction letter on 6/13/17. Care Coordinator will do no further outreaches at this time.    Angela Whitfield RN, [facility]  RN Care Coordinator  [facility], [facility]  Phone # [phone]  6/14/2017 2:37 PM

## 2017-06-15 ENCOUNTER — MYC MEDICAL ADVICE (OUTPATIENT)
Dept: FAMILY MEDICINE | Facility: CLINIC | Age: 52
End: 2017-06-15

## 2017-06-15 DIAGNOSIS — B37.31 CANDIDIASIS OF VULVA AND VAGINA: Primary | ICD-10-CM

## 2017-06-15 RX ORDER — FLUCONAZOLE 150 MG/1
150 TABLET ORAL ONCE
Qty: 1 TABLET | Refills: 0 | Status: SHIPPED | OUTPATIENT
Start: 2017-06-15 | End: 2017-06-15

## 2017-06-20 ENCOUNTER — MYC MEDICAL ADVICE (OUTPATIENT)
Dept: FAMILY MEDICINE | Facility: CLINIC | Age: 52
End: 2017-06-20

## 2017-07-06 ENCOUNTER — OFFICE VISIT (OUTPATIENT)
Dept: FAMILY MEDICINE | Facility: CLINIC | Age: 52
End: 2017-07-06
Payer: COMMERCIAL

## 2017-07-06 VITALS
OXYGEN SATURATION: 100 % | HEART RATE: 89 BPM | BODY MASS INDEX: 28.49 KG/M2 | SYSTOLIC BLOOD PRESSURE: 132 MMHG | TEMPERATURE: 97.5 F | DIASTOLIC BLOOD PRESSURE: 80 MMHG | WEIGHT: 166 LBS

## 2017-07-06 DIAGNOSIS — M48.062 SPINAL STENOSIS OF LUMBAR REGION WITH NEUROGENIC CLAUDICATION: ICD-10-CM

## 2017-07-06 DIAGNOSIS — F17.200 TOBACCO USE DISORDER: ICD-10-CM

## 2017-07-06 DIAGNOSIS — Z13.6 CARDIOVASCULAR SCREENING; LDL GOAL LESS THAN 160: Primary | ICD-10-CM

## 2017-07-06 DIAGNOSIS — F19.11 H/O DRUG ABUSE (H): ICD-10-CM

## 2017-07-06 DIAGNOSIS — Z11.59 NEED FOR HEPATITIS C SCREENING TEST: ICD-10-CM

## 2017-07-06 DIAGNOSIS — E78.5 HYPERLIPIDEMIA LDL GOAL <130: ICD-10-CM

## 2017-07-06 LAB
CHOLEST SERPL-MCNC: 94 MG/DL
HDLC SERPL-MCNC: 54 MG/DL
LDLC SERPL CALC-MCNC: 29 MG/DL
NONHDLC SERPL-MCNC: 40 MG/DL
TRIGL SERPL-MCNC: 53 MG/DL

## 2017-07-06 PROCEDURE — 36415 COLL VENOUS BLD VENIPUNCTURE: CPT | Performed by: FAMILY MEDICINE

## 2017-07-06 PROCEDURE — 99214 OFFICE O/P EST MOD 30 MIN: CPT | Performed by: FAMILY MEDICINE

## 2017-07-06 PROCEDURE — 80061 LIPID PANEL: CPT | Performed by: FAMILY MEDICINE

## 2017-07-06 NOTE — PROGRESS NOTES
"  SUBJECTIVE:                                                    Shelley Lew is a 52 year old female who presents to clinic today for the following health issues:      Hyperlipidemia Follow-Up      Rate your low fat/cholesterol diet?: not monitoring fat    Taking statin?  No    Other lipid medications/supplements?:  none      Amount of exercise or physical activity: 6-7 days/week for an average of greater than 60 minutes    Problems taking medications regularly: No    Medication side effects: none    Diet: regular (no restrictions)      S: Shelley Lew is a 52 year old female with hyperlipidemia.  Wants to recheck cholesterol    Meth use: now sober.  Only a month or so.  Committed to staying sober, but doesn't have a strong support network for that  Back pain :improved after surgery.  Happy about that.  Tobacco use: wants to quit, has chantix.    Problem list, med list, additional histories reviewed and updated, as indicated.      O:/80  Pulse 89  Temp 97.5  F (36.4  C) (Tympanic)  Wt 166 lb (75.3 kg)  SpO2 100%  BMI 28.49 kg/m2  GEN: Alert and oriented, in no acute distress  Gait much better than last time I saw her.  \"pain is 80% better after surgery\"        A: hyperlipidemia, recheck       Drug abuse hx, currently sober       Tobacco use disorder, ongoing       Chronic back pain: improved after surgery             P: discussed quitting smoking.  Has chantix ,thinking about trying again.  Almost worked first time.     Thinks she can stay sober.  Doesn't feel the need for AA or similar support right now    Check lipids and hep C.          "

## 2017-07-06 NOTE — NURSING NOTE
"Chief Complaint   Patient presents with     Lipids     check     Hepatitis C     check       Initial /80  Pulse 89  Temp 97.5  F (36.4  C) (Tympanic)  Wt 166 lb (75.3 kg)  SpO2 100%  BMI 28.49 kg/m2 Estimated body mass index is 28.49 kg/(m^2) as calculated from the following:    Height as of 5/23/17: 5' 4\" (1.626 m).    Weight as of this encounter: 166 lb (75.3 kg).  Medication Reconciliation: complete    Health Maintenance that is potentially due pending provider review:  Colonoscopy/FIT and Lipid Screening, Hepatitis C Screening    Possibly completing today per provider review.      "

## 2017-07-06 NOTE — MR AVS SNAPSHOT
After Visit Summary   7/6/2017    Shelley Lew    MRN: [id number]           Patient Information     Date Of Birth          1965        Visit Information        Provider Department      7/6/2017 2:20 PM Seth Pollard MD [facility]        Today's Diagnoses     CARDIOVASCULAR SCREENING; LDL GOAL LESS THAN 160    -  1    Need for hepatitis C screening test        Hyperlipidemia LDL goal <130        Tobacco use disorder        H/O drug abuse        Spinal stenosis of lumbar region with neurogenic claudication           Follow-ups after your visit        Future tests that were ordered for you today     Open Future Orders        Priority Expected Expires Ordered    **Hepatitis C Screen Reflex to RNA FUTURE anytime Routine 7/6/2017 7/6/2018 7/6/2017            Who to contact     If you have questions or need follow up information about today's clinic visit or your schedule please contact [facility] directly at [phone].  Normal or non-critical lab and imaging results will be communicated to you by MyChart, letter or phone within 4 business days after the clinic has received the results. If you do not hear from us within 7 days, please contact the clinic through [employer] or phone. If you have a critical or abnormal lab result, we will notify you by phone as soon as possible.  Submit refill requests through [employer] or call your pharmacy and they will forward the refill request to us. Please allow 3 business days for your refill to be completed.          Additional Information About Your Visit        MyChart Information     [employer] gives you secure access to your electronic health record. If you see a primary care provider, you can also send messages to your care team and make appointments. If you have questions, please call your primary care clinic.  If you do not have a primary care provider, please call [phone] and they will assist you.        Care EveryWhere  ID     This is your Care EveryWhere ID. This could be used by other organizations to access your [place] medical records  [id number]        Your Vitals Were     Pulse Temperature Pulse Oximetry BMI (Body Mass Index)          89 97.5  F (36.4  C) (Tympanic) 100% 28.49 kg/m2         Blood Pressure from Last 3 Encounters:   07/06/17 132/80   05/23/17 124/82   04/11/17 104/68    Weight from Last 3 Encounters:   07/06/17 166 lb (75.3 kg)   05/23/17 183 lb (83 kg)   04/11/17 183 lb (83 kg)              We Performed the Following     Lipid Profile with reflex to direct LDL          Today's Medication Changes          These changes are accurate as of: 7/6/17  2:58 PM.  If you have any questions, ask your nurse or doctor.               Stop taking these medicines if you haven't already. Please contact your care team if you have questions.     acetaminophen-codeine 300-30 MG per tablet   Commonly known as:  TYLENOL #3   Stopped by:  Seth Pollard MD           naproxen 500 MG tablet   Commonly known as:  NAPROSYN   Stopped by:  Seth Pollard MD           traMADol 50 MG tablet   Commonly known as:  ULTRAM   Stopped by:  Seth Pollard MD                    Primary Care Provider Office Phone # Fax #    Seth FLEMING. MD Atul [phone]       [facility] [address] [facility] [address]        Equal Access to Services     [facility] AH: Hadii deysi ku hadasho Soomaali, waaxda luqadaha, qaybta kaalmada adeegyada, nathalie holden . So [facility] [phone].    ATENCIÓN: Si habla español, tiene a durbin disposición servicios gratuitos de asistencia lingüística. Llame al [phone].    We comply with applicable federal civil rights laws and [place] laws. We do not discriminate on the basis of race, color, national origin, age, disability sex, sexual orientation or gender identity.            Thank you!     Thank you for choosing [facility]  for your care.  Our goal is always to provide you with excellent care. Hearing back from our patients is one way we can continue to improve our services. Please take a few minutes to complete the written survey that you may receive in the mail after your visit with us. Thank you!             Your Updated Medication List - Protect others around you: Learn how to safely use, store and throw away your medicines at www.disposemymeds.org.          This list is accurate as of: 7/6/17  2:58 PM.  Always use your most recent med list.                   Brand Name Dispense Instructions for use Diagnosis    Multi-vitamin Tabs tablet      Take 1 tablet by mouth daily Reported on 5/23/2017        * varenicline 0.5 MG X 11 & 1 MG X 42 tablet    CHANTIX STARTING MONTH SANJU    53 tablet    Take 0.5 mg tab daily for 3 days, then 0.5 mg tab twice daily for 4 days, then 1 mg twice daily.    Tobacco use disorder       * varenicline 1 MG tablet    CHANTIX    56 tablet    Take 1 tablet (1 mg) by mouth 2 times daily    Tobacco use disorder       * Notice:  This list has 2 medication(s) that are the same as other medications prescribed for you. Read the directions carefully, and ask your doctor or other care provider to review them with you.

## 2017-09-15 ENCOUNTER — MYC MEDICAL ADVICE (OUTPATIENT)
Dept: FAMILY MEDICINE | Facility: CLINIC | Age: 52
End: 2017-09-15

## 2017-11-13 ENCOUNTER — MYC MEDICAL ADVICE (OUTPATIENT)
Dept: FAMILY MEDICINE | Facility: CLINIC | Age: 52
End: 2017-11-13

## 2017-11-13 DIAGNOSIS — B37.31 YEAST INFECTION OF THE VAGINA: Primary | ICD-10-CM

## 2017-11-13 RX ORDER — FLUCONAZOLE 150 MG/1
150 TABLET ORAL ONCE
Qty: 1 TABLET | Refills: 0 | Status: SHIPPED | OUTPATIENT
Start: 2017-11-13 | End: 2017-12-12

## 2017-12-12 ENCOUNTER — MYC MEDICAL ADVICE (OUTPATIENT)
Dept: FAMILY MEDICINE | Facility: CLINIC | Age: 52
End: 2017-12-12

## 2017-12-12 DIAGNOSIS — B37.31 YEAST INFECTION OF THE VAGINA: ICD-10-CM

## 2017-12-12 RX ORDER — FLUCONAZOLE 150 MG/1
150 TABLET ORAL ONCE
Qty: 1 TABLET | Refills: 0 | Status: SHIPPED | OUTPATIENT
Start: 2017-12-12 | End: 2017-12-12

## 2018-06-09 ENCOUNTER — MYC REFILL (OUTPATIENT)
Dept: FAMILY MEDICINE | Facility: CLINIC | Age: 53
End: 2018-06-09

## 2018-06-09 DIAGNOSIS — F17.200 TOBACCO USE DISORDER: ICD-10-CM

## 2018-06-11 RX ORDER — VARENICLINE TARTRATE 1 MG/1
1 TABLET, FILM COATED ORAL 2 TIMES DAILY
Qty: 56 TABLET | Refills: 2 | Status: SHIPPED | OUTPATIENT
Start: 2018-06-11 | End: 2019-01-09

## 2018-06-11 NOTE — TELEPHONE ENCOUNTER
Message from MyChart:  Original authorizing provider: MD Shelley Davis would like a refill of the following medications:  varenicline (CHANTIX STARTING MONTH SANJU) 0.5 MG X 11 & 1 MG X 42 tablet [Seth Pollard MD]  varenicline (CHANTIX) 1 MG tablet [Seth Pollard MD]    Preferred pharmacy: Other - [facility] [place]    Comment:

## 2018-10-23 ENCOUNTER — OFFICE VISIT (OUTPATIENT)
Dept: ORTHOPEDICS | Facility: CLINIC | Age: 53
End: 2018-10-23
Payer: COMMERCIAL

## 2018-10-23 VITALS
BODY MASS INDEX: 29.53 KG/M2 | HEIGHT: 64 IN | WEIGHT: 173 LBS | SYSTOLIC BLOOD PRESSURE: 130 MMHG | DIASTOLIC BLOOD PRESSURE: 86 MMHG

## 2018-10-23 DIAGNOSIS — M54.16 LUMBAR RADICULOPATHY: ICD-10-CM

## 2018-10-23 DIAGNOSIS — G56.01 RIGHT CARPAL TUNNEL SYNDROME: Primary | ICD-10-CM

## 2018-10-23 PROCEDURE — 99214 OFFICE O/P EST MOD 30 MIN: CPT | Performed by: FAMILY MEDICINE

## 2018-10-23 RX ORDER — GABAPENTIN 300 MG/1
300 CAPSULE ORAL 3 TIMES DAILY PRN
Qty: 90 CAPSULE | Refills: 0 | Status: SHIPPED | OUTPATIENT
Start: 2018-10-23 | End: 2019-05-28

## 2018-10-23 NOTE — PROGRESS NOTES
Shelley Lew  :  1965  DOS: 2017  MRN: [id number]    Sports Medicine Clinic Visit    PCP: Seth Pollard    Shelley Lew is a 52 year old Right hand dominant female who is seen as a self referral presenting with right hand numbness/tingling.    Injury: Chronic right hand numbness/tingling, carpal tunnel complaints over the 4 - 5 years, worse over last 6 months.  Pain located over right hand, all fingers, nonradiating.  Additional Features:  Positive: numbness and weakness.  Symptoms are better with Nothing.  Symptoms are worse with: waking in AM, gripping/grapsing objects, lifting.  Other evaluation and/or treatments so far consists of: Ibuprofen, Rest and wrist braces.  Uses braces mostly at night.  Recent imaging completed: EMG completed in , noted moderate CTS at that time per patient.  Prior History of related problems: H/o left carpal tunnel surgery in  by Dr Gonzalez @ [facility].     Social History: unemployed    Interim History - 2018  Since last visit on 17 patient has worsening right hand numbness/tingling.  Patient reports that pain has progressively worsened over the last ~ 6 months.  No interim injury.       Second complaint of radiating left sided low back pain over the last ~ 8 months.  Notes radiating pain to left buttocks and posterior thigh.  Pain is worse with prolonged driving, going from sit to stand, and walking.  Currently treating with OTC pain medication with minimal relief.  No recent imaging completed.  Previously h/o s/p L4-L5 laminectomy completed by Dr Bangura @ [facility] in 2017.  Has good relief of sx following surgery, but has since returned.      Review of Systems  Musculoskeletal: as above  Remainder of review of systems is negative including constitutional, CV, pulmonary, GI, Skin and Neurologic except as noted in HPI or medical history.    Past Medical History:   Diagnosis Date      (normal spontaneous vaginal delivery)     X2     PONV  "(postoperative nausea and vomiting)      Pyelonephritis, acute      Past Surgical History:   Procedure Laterality Date     CHOLECYSTECTOMY       LAMINECTOMY LUMBAR POSTERIOR MICROSCOPIC TWO LEVELS N/A 4/11/2017    Procedure: LAMINECTOMY LUMBAR POSTERIOR MICROSCOPIC TWO LEVELS;  Surgeon: Nehemias Bangura MD;  Location: [facility] OR     LAPAROSCOPY,TUBAL CAUTERY       RELEASE CARPAL TUNNEL  5/21/2013    Procedure: RELEASE CARPAL TUNNEL;  Left carpal tunnel release  ;  Surgeon: Yovani Gonzalez MD;  Location: [facility] OR       Objective  /86  Ht 5' 4\" (1.626 m)  Wt 173 lb (78.5 kg)  BMI 29.7 kg/m2    General: healthy, alert and in no distress    HEENT: no scleral icterus or conjunctival erythema   Skin: no suspicious lesions or rash. No jaundice.   CV: regular rhythm by palpation, 2+ distal pulses, no pedal edema    Resp: normal respiratory effort without conversational dyspnea   Psych: normal mood and affect    Gait: nonantalgic, appropriate coordination and balance   Neuro: normal light touch sensory exam of the extremities. Motor strength as noted below     Right Wrist and Hand exam    Inspection:       No swelling, bruising or deformity bilateral    Tender:       Mild over flexor retinaculum and flexor muscles of forearm    Non Tender:       Remainder of the Wrist and Hand right,      anatomic snuffbox right,      scapholunate interval right and      TFCC right    ROM:       Full and symmetric active and passive range of motion of the forearm, wrist and digits bilateral and      Pain with passive flexion and extension on the right     Strength:       5/5 strength in the muscles of the hand, wrist and forearm bilateral    Special Tests:        positive (+) Tinel's test right,       positive (+) Phatlen's test right,       neg (-) TFCC compression test bilateral and       neg (-) Finkelstein's maneuver bilateral    Neurovascular:       2+ radial pulses bilaterally with brisk capillary refill,      normal " sensation to light touch in the radial, median and ulnar nerve distributions and      abnormal sensation with CTS testing as above    Bilateral Elbow exam:    Full strength and ROM without laxity or pain    Low back exam:    Inspection:       normal skin       normal vascular       normal lymphatic       Decreased lumbar lordosis    ROM:       limited flexion due to pain       limited extension due to pain    Tender:       Midline lower lumbar below L3       paraspinal muscles L>R lower lumbar       B/l SI joints, L>R      Mild over L GT and piriformis    Non Tender:       remainder of lumbar spine    Strength on L, 5/5 on R:       hip flexion 4/5       knee extension 4/5       ankle dorsiflexion 4/5       ankle plantarflexion 3/5       dorsiflexion of the great toe 3/5    Reflexes:       patellar (L3, L4) symmetric diminished       achilles tendons (S1) asymmetric diminished on L    Sensation:      grossly intact throughout lower extremities    Skin:       well perfused       capillary refill brisk    Special tests:       straight leg raise left pos        straight leg raise right neg       neg (-) AMADOU       + slump on L       B/l mild + facet compression       Radiology:  None performed today    Assessment:  1. Right carpal tunnel syndrome    2. Lumbar radiculopathy        Plan:  Discussed the assessment with the patient.  Follow up: prn  Discussed value of EMG, agree to defer for now  Offered CSI under US guidance, but with somewhat limited expectations given the severity and duration of sx  She had a great result from her left wrist CTS surgery with Dr Gonzalez of [facility]  Will refer back to him for consult on R   OT options reviewed  Bracing and general conservative care reviewed  Low suspicion of more proximal nerve issue, EMG could clarify if needed  Referred for updated MRI and surgical consult given recent surgery at [facility]  Home handouts provided and supportive care reviewed  All questions were answered  today  Contact us with additional questions or concerns  Signs and sx of concern reviewed      Rancho Hernandez DO, CAQ  Primary Care Sports Medicine  [facility]       Time spent in one-on-one evaluation and discussion with patient regarding nature of problem, course, prior treatments, and therapeutic options, at least 50% of which was spent in counseling and coordination of care: 25 minutes        Disclaimer: This note consists of symbols derived from keyboarding, dictation and/or voice recognition software. As a result, there may be errors in the script that have gone undetected. Please consider this when interpreting information found in this chart.

## 2018-10-23 NOTE — MR AVS SNAPSHOT
After Visit Summary   10/23/2018    Shelley Lew    MRN: [id number]           Patient Information     Date Of Birth          1965        Visit Information        Provider Department      10/23/2018 3:20 PM Rancho Hernandez DO [facility]        Today's Diagnoses     Right carpal tunnel syndrome    -  1    Lumbar radiculopathy           Follow-ups after your visit        Additional Services     ORTHO  REFERRAL       [facility] is referring you to the Orthopedic  Services at [facility].       The  Representative will assist you in the coordination of your Orthopedic and Musculoskeletal Care as prescribed by your physician.    The  Representative will call you within 1 business day to help schedule your appointment, or you may contact the  Representative at:    All areas ~ [phone]     Type of Referral : Surgical / Specialist Dr Gerardo Gonzalez of [facility]      Timeframe requested: Routine    Coverage of these services is subject to the terms and limitations of your health insurance plan.  Please call member services at your health plan with any benefit or coverage questions.      If X-rays, CT or MRI's have been performed, please contact the facility where they were done to arrange for , prior to your scheduled appointment.  Please bring this referral request to your appointment and present it to your specialist.            ORTHO  REFERRAL       Coverage of these services is subject to the terms and limitations of your health insurance plan. Please call member services at your health plan with any benefit or coverage questions.       [facility] is referring you to the Orthopedic  Services at [facility].       The  representative will assist you in the coordination of your orthopedic and musculoskeletal  care as prescribed by your physician.    The  representative will call you within 24 hours or   You may contact the [facility] representative at:   [phone] for [facility]  [phone] for [facility], and [facility]  [phone] for [facility]    Type of Referral : Surgical / Specialist Dr Méndez of [facility] in [place]      Timeframe requested: within 2 weeks      If X-rays, CT or MRI's   have been performed, please contact the facility where they were done, to arrange for  prior to your scheduled appointment. Please bring this referral request to your appointment and present it to your specialist.                  Your next 10 appointments already scheduled     Nov 10, 2018  9:00 AM CST   MA SCREENING DIGITAL BILATERAL with [id number]   [facility] ([facility])    [address] [address]   [phone]           How do I prepare for my exam? (Food and drink instructions) No Food and Drink Restrictions.  How do I prepare for my exam? (Other instructions) Do not use any powder, lotion or deodorant under your arms or on your breast. If you do, we will ask you to remove it before your exam.  What should I wear: Wear comfortable, two-piece clothing.  How long does the exam take: Most scans will take 15 minutes.  What should I bring: Bring any previous mammograms from other facilities or have them mailed to the breast center.  Do I need a :  No  is needed.  What do I need to tell my doctor: If you have any allergies, tell your care team.  What should I do after the exam: No restrictions, You may resume normal activities.  What is this test: This test is an x-ray of the breast to look for breast disease. The breast is pressed between two plates to flatten and spread the tissue. An X-ray is taken of the breast from different angles.  Who should I call with questions: If you have any questions, please call the Imaging Department  "where you will have your exam. Directions, parking instructions, and other information is available on our website, [email or web address].  Other information about my exam Three-dimensional (3D) mammograms are available at [facility] locations in [facility], [facility], [place], [facility], [place] and [place]. [facility] locations include [place] and [facility] in [place].  Benefits of 3D mammograms include * Improved rate of cancer detection * Decreases your chance of having to go back for more tests, which means fewer: * \"False-positive\" results (This means that there is an abnormal area but it isn't cancer.) * Invasive testing procedures, such as a biopsy or surgery * Can provide clearer images of the breast if you have dense breast tissue.  *3D mammography is an optional exam that anyone can have with a 2D mammogram. It doesn't replace or take the place of a 2D mammogram. 2D mammograms remain an effective screening test for all women.  Not all insurance companies cover the cost of a 3D mammogram. Check with your insurance. Three-dimensional (3D) mammograms are available at [facility] locations in [facility], [facility], [facility], and [place]. Health locations include [place] and [facility] in [place]. Benefits of 3D mammograms include: - Improved rate of cancer detection - Decreases your chance of having to go back for more tests, which means fewer: - \"False-positive\" results (This means that there is an abnormal area but it isn't cancer.) - Invasive testing procedures, such as a biopsy or surgery - Can provide clearer images of the breast if you have dense breast tissue. 3D mammography is an optional exam that anyone can have with a 2D mammogram. It doesn't replace or take the place of a 2D mammogram. 2D mammograms remain an effective screening test for all women.  Not all insurance companies cover " the cost of a 3D mammogram. Check with your insurance.            Nov 10, 2018  9:30 AM CST   MR LUMBAR SPINE W/O CONTRAST with [id number]   [facility] ([facility])    [address]  [facility] [address]   [phone]           How do I prepare for my exam? (Food and drink instructions) **If you will be receiving sedation or general anesthesia, please see special notes below.**  How do I prepare for my exam? (Other instructions) Take your medicines as usual, unless your doctor tells you not to. Please remove any body piercings and hair extensions before you arrive. Follow your doctor s orders. If you do not, we may have to postpone your exam. You may or may not receive IV contrast for this exam pending the discretion of the Radiologist.  You do not need to do anything special to prepare. **If you will be receiving sedation or general anesthesia, please see special notes below.**  What should I wear:  The MRI machine uses a strong magnet. Please wear clothes without metal (snaps, zippers). A sweatsuit works well, or we may give you a hospital gown.  How long does the exam take: Most tests take 30 to 60 minutes.  HOWEVER, IF YOUR DOCTOR PRESCRIBES ANESTHESIA please plan on spending four to five hours in the recovery room.  What should I bring: Bring a list of your current medicines to your exam (including vitamins, minerals and over-the-counter drugs). Also bring the results of similar scans you may have had.  Do I need a : **If you will be receiving sedation or general anesthesia, please see special notes below.**  What should I do after the exam? No Restrictions, You may resume normal activities.  What is this test: MRI (magnetic resonance imaging) uses a strong magnet and radio waves to look inside the body. An MRA (magnetic resonance angiogram) does the same thing, but it lets us look at your blood vessels. A computer turns the radio waves into pictures showing cross sections of  the body, much like slices of bread. This helps us see any problems more clearly.  Who should I call with questions: Please call the Imaging Department at your exam site with any questions. Directions, parking instructions, and other information is available on our website, [email or web address].  How do I prepare if I m having sedation or anesthesia? **IMPORTANT** THE INSTRUCTIONS BELOW ARE ONLY FOR THOSE PATIENTS WHO HAVE BEEN TOLD THEY WILL RECEIVE SEDATION OR GENERAL ANESTHESIA DURING THEIR MRI PROCEDURE:  IF YOU WILL RECEIVE SEDATION (take medicine to help you relax during your exam): You must get the medicine from your doctor before you arrive. Bring the medicine to the exam. Do not take it at home. Arrive one hour early. Bring someone who can take you home after the test. Your medicine will make you sleepy. After the exam, you may not drive, take a bus or take a taxi by yourself. No eating 8 hours before your exam. You may have clear liquids up until 4 hours before your exam. (Clear liquids include water, clear tea, black coffee and fruit juice without pulp.)  IF YOU WILL RECEIVE ANESTHESIA (be asleep for your exam): Arrive 1 1/2 hours early. Bring someone who can take you home after the test. You may not drive, take a bus or take a taxi by yourself. No eating 8 hours before your exam. You may have clear liquids up until 4 hours before your exam. (Clear liquids include water, clear tea, black coffee and fruit juice without pulp.) You will spend four to five hours in the recovery room.              Who to contact     If you have questions or need follow up information about today's clinic visit or your schedule please contact [facility] directly at [phone].  Normal or non-critical lab and imaging results will be communicated to you by MyChart, letter or phone within 4 business days after the clinic has received the results. If you do not hear from us within 7 days, please  "contact the clinic through [employer] or phone. If you have a critical or abnormal lab result, we will notify you by phone as soon as possible.  Submit refill requests through [employer] or call your pharmacy and they will forward the refill request to us. Please allow 3 business days for your refill to be completed.          Additional Information About Your Visit        [email or web address] Information     [employer] gives you secure access to your electronic health record. If you see a primary care provider, you can also send messages to your care team and make appointments. If you have questions, please call your primary care clinic.  If you do not have a primary care provider, please call [phone] and they will assist you.        Care EveryWhere ID     This is your Care EveryWhere ID. This could be used by other organizations to access your [facility] [place] medical records  [id number]        Your Vitals Were     Height BMI (Body Mass Index)                5' 4\" (1.626 m) 29.7 kg/m2           Blood Pressure from Last 3 Encounters:   10/23/18 130/86   07/06/17 132/80   05/23/17 124/82    Weight from Last 3 Encounters:   10/23/18 173 lb (78.5 kg)   07/06/17 166 lb (75.3 kg)   05/23/17 183 lb (83 kg)              We Performed the Following     ORTHO  REFERRAL     ORTHO  REFERRAL          Today's Medication Changes          These changes are accurate as of 10/23/18 11:59 PM.  If you have any questions, ask your nurse or doctor.               Start taking these medicines.        Dose/Directions    gabapentin 300 MG capsule   Commonly known as:  NEURONTIN   Used for:  Lumbar radiculopathy   Started by:  Rancho Hernandez DO        Dose:  300 mg   Take 1 capsule (300 mg) by mouth 3 times daily as needed (take up to three times daily as needed, can take up to 600mg at night if needed)   Quantity:  90 capsule   Refills:  0            Where to get your medicines      These medications were sent to [facility] " [facility] - [place], [place] - [address]  [address], [address] [facility] [address]    Hours:  24-hours Phone:  [phone]     gabapentin 300 MG capsule                Primary Care Provider Office Phone # Fax #    Seth Pollard [phone]       [address]  [place] [address]        Equal Access to Services     ANITA VASQUEZ : Hadii aad ku hadasho Soomaali, waaxda luqadaha, qaybta kaalmada adeegyada, waxay idiin hayaan adeeg khararonnell laleodan . So [facility] [phone].    ATENCIÓN: Si candelaria wheeler, tiene a durbin disposición servicios gratuitos de asistencia lingüística. [facility] [phone].    We comply with applicable federal civil rights laws and [place] laws. We do not discriminate on the basis of race, color, national origin, age, disability, sex, sexual orientation, or gender identity.            Thank you!     Thank you for choosing [facility]  for your care. Our goal is always to provide you with excellent care. Hearing back from our patients is one way we can continue to improve our services. Please take a few minutes to complete the written survey that you may receive in the mail after your visit with us. Thank you!             Your Updated Medication List - Protect others around you: Learn how to safely use, store and throw away your medicines at www.disposemymeds.org.          This list is accurate as of 10/23/18 11:59 PM.  Always use your most recent med list.                   Brand Name Dispense Instructions for use Diagnosis    gabapentin 300 MG capsule    NEURONTIN    90 capsule    Take 1 capsule (300 mg) by mouth 3 times daily as needed (take up to three times daily as needed, can take up to 600mg at night if needed)    Lumbar radiculopathy       Multi-vitamin Tabs tablet      Take 1 tablet by mouth daily Reported on 5/23/2017        * varenicline 0.5 MG X 11 & 1 MG X 42 tablet    CHANTIX  STARTING MONTH SANJU    53 tablet    Take 0.5 mg tab daily for 3 days, then 0.5 mg tab twice daily for 4 days, then 1 mg twice daily.    Tobacco use disorder       * varenicline 1 MG tablet    CHANTIX    56 tablet    Take 1 tablet (1 mg) by mouth 2 times daily    Tobacco use disorder       * Notice:  This list has 2 medication(s) that are the same as other medications prescribed for you. Read the directions carefully, and ask your doctor or other care provider to review them with you.

## 2018-10-23 NOTE — LETTER
10/23/2018         RE: Shelley Lew  [address]  [place] [address]        Dear Colleague,    Thank you for referring your patient, Shelley Lew, to the [facility]. Please see a copy of my visit note below.    Shelley Lew  :  1965  DOS: 2017  MRN: [id number]    Sports Medicine Clinic Visit    PCP: Seth Pollard    Shelley Lew is a 52 year old Right hand dominant female who is seen as a self referral presenting with right hand numbness/tingling.    Injury: Chronic right hand numbness/tingling, carpal tunnel complaints over the 4 - 5 years, worse over last 6 months.  Pain located over right hand, all fingers, nonradiating.  Additional Features:  Positive: numbness and weakness.  Symptoms are better with Nothing.  Symptoms are worse with: waking in AM, gripping/grapsing objects, lifting.  Other evaluation and/or treatments so far consists of: Ibuprofen, Rest and wrist braces.  Uses braces mostly at night.  Recent imaging completed: EMG completed in , noted moderate CTS at that time per patient.  Prior History of related problems: H/o left carpal tunnel surgery in  by Dr Gonzalez @ [facility].     Social History: unemployed    Interim History - 2018  Since last visit on 17 patient has worsening right hand numbness/tingling.  Patient reports that pain has progressively worsened over the last ~ 6 months.  No interim injury.       Second complaint of radiating left sided low back pain over the last ~ 8 months.  Notes radiating pain to left buttocks and posterior thigh.  Pain is worse with prolonged driving, going from sit to stand, and walking.  Currently treating with OTC pain medication with minimal relief.  No recent imaging completed.  Previously h/o s/p L4-L5 laminectomy completed by Dr Bangura @ [facility] in 2017.  Has good relief of sx following surgery, but has since returned.      Review of Systems  Musculoskeletal: as  "above  Remainder of review of systems is negative including constitutional, CV, pulmonary, GI, Skin and Neurologic except as noted in HPI or medical history.    Past Medical History:   Diagnosis Date      (normal spontaneous vaginal delivery)     X2     PONV (postoperative nausea and vomiting)      Pyelonephritis, acute      Past Surgical History:   Procedure Laterality Date     CHOLECYSTECTOMY       LAMINECTOMY LUMBAR POSTERIOR MICROSCOPIC TWO LEVELS N/A 2017    Procedure: LAMINECTOMY LUMBAR POSTERIOR MICROSCOPIC TWO LEVELS;  Surgeon: Nehemias Bangura MD;  Location: [facility]     LAPAROSCOPY,TUBAL CAUTERY       RELEASE CARPAL TUNNEL  2013    Procedure: RELEASE CARPAL TUNNEL;  Left carpal tunnel release  ;  Surgeon: Yovani Gonzalez MD;  Location: [facility] OR       Objective  /86  Ht 5' 4\" (1.626 m)  Wt 173 lb (78.5 kg)  BMI 29.7 kg/m2    General: healthy, alert and in no distress    HEENT: no scleral icterus or conjunctival erythema   Skin: no suspicious lesions or rash. No jaundice.   CV: regular rhythm by palpation, 2+ distal pulses, no pedal edema    Resp: normal respiratory effort without conversational dyspnea   Psych: normal mood and affect    Gait: nonantalgic, appropriate coordination and balance   Neuro: normal light touch sensory exam of the extremities. Motor strength as noted below     Right Wrist and Hand exam    Inspection:       No swelling, bruising or deformity bilateral    Tender:       Mild over flexor retinaculum and flexor muscles of forearm    Non Tender:       Remainder of the Wrist and Hand right,      anatomic snuffbox right,      scapholunate interval right and      TFCC right    ROM:       Full and symmetric active and passive range of motion of the forearm, wrist and digits bilateral and      Pain with passive flexion and extension on the right     Strength:       5/5 strength in the muscles of the hand, wrist and forearm bilateral    Special Tests:        positive " (+) Tinel's test right,       positive (+) Phatlen's test right,       neg (-) TFCC compression test bilateral and       neg (-) Finkelstein's maneuver bilateral    Neurovascular:       2+ radial pulses bilaterally with brisk capillary refill,      normal sensation to light touch in the radial, median and ulnar nerve distributions and      abnormal sensation with CTS testing as above    Bilateral Elbow exam:    Full strength and ROM without laxity or pain    Low back exam:    Inspection:       normal skin       normal vascular       normal lymphatic       Decreased lumbar lordosis    ROM:       limited flexion due to pain       limited extension due to pain    Tender:       Midline lower lumbar below L3       paraspinal muscles L>R lower lumbar       B/l SI joints, L>R      Mild over L GT and piriformis    Non Tender:       remainder of lumbar spine    Strength on L, 5/5 on R:       hip flexion 4/5       knee extension 4/5       ankle dorsiflexion 4/5       ankle plantarflexion 3/5       dorsiflexion of the great toe 3/5    Reflexes:       patellar (L3, L4) symmetric diminished       achilles tendons (S1) asymmetric diminished on L    Sensation:      grossly intact throughout lower extremities    Skin:       well perfused       capillary refill brisk    Special tests:       straight leg raise left pos        straight leg raise right neg       neg (-) AMADOU       + slump on L       B/l mild + facet compression       Radiology:  None performed today    Assessment:  1. Right carpal tunnel syndrome    2. Lumbar radiculopathy        Plan:  Discussed the assessment with the patient.  Follow up: prn  Discussed value of EMG, agree to defer for now  Offered CSI under US guidance, but with somewhat limited expectations given the severity and duration of sx  She had a great result from her left wrist CTS surgery with Dr Gonzalez of [facility]  Will refer back to him for consult on R   OT options reviewed  Bracing and general  conservative care reviewed  Low suspicion of more proximal nerve issue, EMG could clarify if needed  Referred for updated MRI and surgical consult given recent surgery at [facility]  Home handouts provided and supportive care reviewed  All questions were answered today  Contact us with additional questions or concerns  Signs and sx of concern reviewed      Rancho Hernandez DO, CAQ  Primary Care Sports Medicine  [facility]       Time spent in one-on-one evaluation and discussion with patient regarding nature of problem, course, prior treatments, and therapeutic options, at least 50% of which was spent in counseling and coordination of care: 25 minutes        Disclaimer: This note consists of symbols derived from keyboarding, dictation and/or voice recognition software. As a result, there may be errors in the script that have gone undetected. Please consider this when interpreting information found in this chart.    Again, thank you for allowing me to participate in the care of your patient.        Sincerely,        Rancho Hernandez DO

## 2018-10-29 ENCOUNTER — HEALTH MAINTENANCE LETTER (OUTPATIENT)
Age: 53
End: 2018-10-29

## 2019-01-09 ENCOUNTER — OFFICE VISIT (OUTPATIENT)
Dept: FAMILY MEDICINE | Facility: CLINIC | Age: 54
End: 2019-01-09
Payer: COMMERCIAL

## 2019-01-09 VITALS
RESPIRATION RATE: 20 BRPM | HEIGHT: 64 IN | DIASTOLIC BLOOD PRESSURE: 62 MMHG | WEIGHT: 173 LBS | TEMPERATURE: 97.8 F | HEART RATE: 88 BPM | SYSTOLIC BLOOD PRESSURE: 98 MMHG | BODY MASS INDEX: 29.53 KG/M2

## 2019-01-09 DIAGNOSIS — L30.9 DERMATITIS: ICD-10-CM

## 2019-01-09 DIAGNOSIS — F19.11 H/O DRUG ABUSE (H): ICD-10-CM

## 2019-01-09 DIAGNOSIS — Z00.00 WELL ADULT EXAM: Primary | ICD-10-CM

## 2019-01-09 DIAGNOSIS — Z11.51 SCREENING FOR HUMAN PAPILLOMAVIRUS: ICD-10-CM

## 2019-01-09 DIAGNOSIS — Z12.11 SPECIAL SCREENING FOR MALIGNANT NEOPLASMS, COLON: ICD-10-CM

## 2019-01-09 DIAGNOSIS — M48.062 SPINAL STENOSIS OF LUMBAR REGION WITH NEUROGENIC CLAUDICATION: ICD-10-CM

## 2019-01-09 DIAGNOSIS — Z23 NEED FOR PROPHYLACTIC VACCINATION AND INOCULATION AGAINST INFLUENZA: ICD-10-CM

## 2019-01-09 DIAGNOSIS — F17.200 TOBACCO USE DISORDER: ICD-10-CM

## 2019-01-09 PROCEDURE — 90682 RIV4 VACC RECOMBINANT DNA IM: CPT | Performed by: FAMILY MEDICINE

## 2019-01-09 PROCEDURE — 99396 PREV VISIT EST AGE 40-64: CPT | Mod: 25 | Performed by: FAMILY MEDICINE

## 2019-01-09 PROCEDURE — 90471 IMMUNIZATION ADMIN: CPT | Performed by: FAMILY MEDICINE

## 2019-01-09 PROCEDURE — G0145 SCR C/V CYTO,THINLAYER,RESCR: HCPCS | Performed by: FAMILY MEDICINE

## 2019-01-09 PROCEDURE — G0476 HPV COMBO ASSAY CA SCREEN: HCPCS | Performed by: FAMILY MEDICINE

## 2019-01-09 RX ORDER — VARENICLINE TARTRATE 1 MG/1
1 TABLET, FILM COATED ORAL 2 TIMES DAILY
Qty: 56 TABLET | Refills: 2 | Status: SHIPPED | OUTPATIENT
Start: 2019-01-09 | End: 2019-08-01

## 2019-01-09 RX ORDER — CEPHALEXIN 500 MG/1
500 CAPSULE ORAL
COMMUNITY
Start: 2019-01-08 | End: 2019-05-20

## 2019-01-09 RX ORDER — HYDROCORTISONE VALERATE 2 MG/G
OINTMENT TOPICAL 2 TIMES DAILY
Qty: 15 G | Refills: 1 | Status: SHIPPED | OUTPATIENT
Start: 2019-01-09 | End: 2019-05-28

## 2019-01-09 ASSESSMENT — ENCOUNTER SYMPTOMS
NAUSEA: 0
EYE PAIN: 0
ABDOMINAL PAIN: 0
WEAKNESS: 0
BREAST MASS: 0
HEARTBURN: 0
NERVOUS/ANXIOUS: 0
CONSTIPATION: 0
PARESTHESIAS: 0
DIARRHEA: 0
FEVER: 0
HEMATURIA: 0
SHORTNESS OF BREATH: 0
DIZZINESS: 0
JOINT SWELLING: 0
DYSURIA: 0
FREQUENCY: 0
HEADACHES: 0
CHILLS: 0
COUGH: 0
ARTHRALGIAS: 0
SORE THROAT: 0
MYALGIAS: 0
PALPITATIONS: 0
HEMATOCHEZIA: 0

## 2019-01-09 ASSESSMENT — MIFFLIN-ST. JEOR: SCORE: 1374.72

## 2019-01-09 NOTE — PROGRESS NOTES
SUBJECTIVE:   CC: Shelley Lew is an 53 year old woman who presents for preventive health visit.     Physical   Annual:     Getting at least 3 servings of Calcium per day:  NO    Bi-annual eye exam:  NO    Dental care twice a year:  NO    Sleep apnea or symptoms of sleep apnea:  None    Diet:  Regular (no restrictions)    Frequency of exercise:  2-3 days/week    Duration of exercise:  15-30 minutes    Taking medications regularly:  Yes    Medication side effects:  None    Additional concerns today:  No    PHQ-2 Total Score: 0    Rash - x 2 weeks/ on face/ very itchy.  Some on cheeks.  Red, itchy.  Nowhere else.    Tobacco use:  chantix worked before.  Wants to try again.  Meth use: intermittent.  Feels she has it under control.  Thinking about quitting, but not particularly motivated right now.         Today's PHQ-2 Score:   PHQ-2 ( 1999 Pfizer) 1/9/2019   Q1: Little interest or pleasure in doing things 0   Q2: Feeling down, depressed or hopeless 0   PHQ-2 Score 0   Q1: Little interest or pleasure in doing things Not at all   Q2: Feeling down, depressed or hopeless Not at all   PHQ-2 Score 0       Abuse: Current or Past(Physical, Sexual or Emotional)- No  Do you feel safe in your environment? Yes    Social History     Tobacco Use     Smoking status: Light Tobacco Smoker     Types: Cigarettes     Smokeless tobacco: Never Used     Tobacco comment: one pack lasts a week   Substance Use Topics     Alcohol use: Yes     Comment: OCC.     Alcohol Use 1/9/2019   If you drink alcohol do you typically have greater than 3 drinks per day OR greater than 7 drinks per week? Not Applicable       Reviewed orders with patient.  Reviewed health maintenance and updated orders accordingly - Yes      Mammogram Screening: Patient over age 50, mutual decision to screen reflected in health maintenance.    Pertinent mammograms are reviewed under the imaging tab.  History of abnormal Pap smear: no - updated in Problem List and Health  "Maintenance accordingly  PAP / HPV 3/24/2016 9/17/2009   PAP NIL NIL     Reviewed and updated as needed this visit by clinical staff         Reviewed and updated as needed this visit by Provider        Back pain: chronic.  Seeing spine surg to check in on it tomorrow.      Review of Systems   Constitutional: Negative for chills and fever.   HENT: Negative for congestion, ear pain, hearing loss and sore throat.    Eyes: Negative for pain and visual disturbance.   Respiratory: Negative for cough and shortness of breath.    Cardiovascular: Negative for chest pain, palpitations and peripheral edema.   Gastrointestinal: Negative for abdominal pain, constipation, diarrhea, heartburn, hematochezia and nausea.   Breasts:  Negative for tenderness, breast mass and discharge.   Genitourinary: Negative for dysuria, frequency, genital sores, hematuria, pelvic pain, urgency, vaginal bleeding and vaginal discharge.   Musculoskeletal: Negative for arthralgias, joint swelling and myalgias.   Skin: Negative for rash.   Neurological: Negative for dizziness, weakness, headaches and paresthesias.   Psychiatric/Behavioral: Negative for mood changes. The patient is not nervous/anxious.           OBJECTIVE:   BP 98/62 (Cuff Size: Adult Large)   Pulse 88   Temp 97.8  F (36.6  C) (Tympanic)   Resp 20   Ht 1.626 m (5' 4\")   Wt 78.5 kg (173 lb)   BMI 29.70 kg/m    Physical Exam  Gen: AOx3, no acute distress  PSYCH: Affect WNL, logical thought and coherent speech   EYES: normal lids, conjunctiva.  Pupils normal. No periorbital swelling.  Neck:supple without lymphadenopathy or mass.    Throat: oroparynx clear, no exudate or tonsilar/palate asymmetry  Ears: normal TMs bilaterally.   CV: RRR, no murmur  Lungs: Clear bilaterally with good effort  Abd: nontender with no mass or organomegaly  Ext: no edema, moving all extremities  Neuro: gait normal, cranial nerves 2-12 grossly intact, symmetric strength, no sensory deficits noted  Skin: has " "some irritated skin on face, nonspecific dermatitis   GENITAL: external genitalia WNL.  No lesions on labia or vulva.  Urethral meatus normal.  Vaginal walls appear normal.  Cervix non friable, no cervical motion tenderness.  No discharge or bleeding appreciated.    Pap done      ASSESSMENT/PLAN:   Well exam  Meth use  Tobacco use  Dermatitis    Gentle steroid prn on face, not to be used regularly or often.  See how that does  Counseled on quitting meth.  She's thinking about it  chantix again.  She had success in past  Await path results on pap    COUNSELING:  Reviewed preventive health counseling, as reflected in patient instructions       Regular exercise       Healthy diet/nutrition    BP Readings from Last 1 Encounters:   10/23/18 130/86     Estimated body mass index is 29.7 kg/m  as calculated from the following:    Height as of 10/23/18: 1.626 m (5' 4\").    Weight as of 10/23/18: 78.5 kg (173 lb).      Weight management plan: diet/exercise     reports that she has been smoking cigarettes.  she has never used smokeless tobacco.  Tobacco Cessation Action Plan: see above       Seth Pollard MD  [facility]  "

## 2019-01-09 NOTE — PROGRESS NOTES

## 2019-01-11 LAB
COPATH REPORT: NORMAL
PAP: NORMAL

## 2019-01-15 LAB
FINAL DIAGNOSIS: NORMAL
HPV HR 12 DNA CVX QL NAA+PROBE: NEGATIVE
HPV16 DNA SPEC QL NAA+PROBE: NEGATIVE
HPV18 DNA SPEC QL NAA+PROBE: NEGATIVE
SPECIMEN DESCRIPTION: NORMAL
SPECIMEN SOURCE CVX/VAG CYTO: NORMAL

## 2019-04-03 PROCEDURE — 82274 ASSAY TEST FOR BLOOD FECAL: CPT | Performed by: FAMILY MEDICINE

## 2019-04-06 DIAGNOSIS — Z12.11 SPECIAL SCREENING FOR MALIGNANT NEOPLASMS, COLON: ICD-10-CM

## 2019-04-06 LAB — HEMOCCULT STL QL IA: NEGATIVE

## 2019-04-25 ENCOUNTER — ANCILLARY PROCEDURE (OUTPATIENT)
Dept: MAMMOGRAPHY | Facility: CLINIC | Age: 54
End: 2019-04-25
Attending: FAMILY MEDICINE
Payer: COMMERCIAL

## 2019-04-25 DIAGNOSIS — Z12.31 VISIT FOR SCREENING MAMMOGRAM: ICD-10-CM

## 2019-04-25 PROCEDURE — 77067 SCR MAMMO BI INCL CAD: CPT | Mod: TC | Performed by: FAMILY MEDICINE

## 2019-05-20 ENCOUNTER — ALLIED HEALTH/NURSE VISIT (OUTPATIENT)
Dept: FAMILY MEDICINE | Facility: CLINIC | Age: 54
End: 2019-05-20
Payer: COMMERCIAL

## 2019-05-20 ENCOUNTER — MYC MEDICAL ADVICE (OUTPATIENT)
Dept: FAMILY MEDICINE | Facility: CLINIC | Age: 54
End: 2019-05-20

## 2019-05-20 VITALS
SYSTOLIC BLOOD PRESSURE: 136 MMHG | DIASTOLIC BLOOD PRESSURE: 94 MMHG | TEMPERATURE: 97.9 F | HEART RATE: 105 BPM | OXYGEN SATURATION: 100 %

## 2019-05-20 DIAGNOSIS — N12 PYELONEPHRITIS: Primary | ICD-10-CM

## 2019-05-20 DIAGNOSIS — N76.0 VAGINITIS AND VULVOVAGINITIS: Primary | ICD-10-CM

## 2019-05-20 PROCEDURE — 99215 OFFICE O/P EST HI 40 MIN: CPT | Performed by: NURSE PRACTITIONER

## 2019-05-20 RX ORDER — CEPHALEXIN 500 MG/1
500 CAPSULE ORAL 4 TIMES DAILY
COMMUNITY
Start: 2019-05-17 | End: 2019-10-09

## 2019-05-20 NOTE — PROGRESS NOTES
"Subjective     Shelley Lew is a 54 year old female who presents to clinic today for the following health issues:    HPI   Patient c/o dizziness (h/o vertigo), Extreme nausea and \"shakiness\".    C/o yesterday was confused and was unable to remember where the Microwave.    Alert and oriented today, x 3.    Feels symptoms are worsening since being seen in ER, already told by her pcp to go back to ER and she presents here.     Facility:  [facility]   Date of visit: 19 for Pyelonephritis  RX for Keflex 500 QID.      Past Medical History:   Diagnosis Date      (normal spontaneous vaginal delivery)     X2     PONV (postoperative nausea and vomiting)      Pyelonephritis, acute      Current Outpatient Medications   Medication     cephALEXin (KEFLEX) 500 MG capsule     gabapentin (NEURONTIN) 300 MG capsule     hydrocortisone valerate (WEST-ROYA) 0.2 % external ointment     multivitamin, therapeutic with minerals (MULTI-VITAMIN) TABS     varenicline (CHANTIX STARTING MONTH ) 0.5 MG X 11 & 1 MG X 42 tablet     varenicline (CHANTIX) 1 MG tablet     No current facility-administered medications for this visit.         Allergies   Allergen Reactions     Percocet [Oxycodone-Acetaminophen] Nausea and Vomiting     Nausea vomiting and head swimming     Vicodin [Hydrocodone-Acetaminophen] Nausea and Vomiting       Review of Systems   ROS COMP: Constitutional, HEENT, cardiovascular, pulmonary, GI, , musculoskeletal, neuro, skin, endocrine and psych systems are negative, except as otherwise noted.      Objective    BP (!) 136/94   Pulse 105   Temp 97.9  F (36.6  C) (Oral)   SpO2 100%   There is no height or weight on file to calculate BMI.  Physical Exam   GENERAL:  Alert, appears ill  EYES: Eyes grossly normal to inspection, PERRL and conjunctivae and sclerae normal  RESP: lungs clear to auscultation - no rales, rhonchi or wheezes  CV: regular rate and rhythm, normal S1 S2, no S3 or S4, no murmur, click or rub, no " peripheral edema and peripheral pulses strong  ABDOMEN: soft, nontender, no hepatosplenomegaly, no masses and bowel sounds normal  MS: no gross musculoskeletal defects noted, no edema  SKIN: no suspicious lesions or rashes  NEURO: Normal strength and tone, mentation intact and speech normal  PSYCH: mentation appears normal, affect normal/bright      Assessment & Plan     (N12) Pyelonephritis  (primary encounter diagnosis)    Plan:  She will go back to [facility] ER for worsening symptoms  She states she is ok to drive, advised ambulance and refused    ROBERTA Delgadillo CNP  [facility]

## 2019-05-28 ENCOUNTER — MYC REFILL (OUTPATIENT)
Dept: ORTHOPEDICS | Facility: CLINIC | Age: 54
End: 2019-05-28

## 2019-05-28 DIAGNOSIS — M54.16 LUMBAR RADICULOPATHY: ICD-10-CM

## 2019-05-29 RX ORDER — GABAPENTIN 300 MG/1
300 CAPSULE ORAL 3 TIMES DAILY PRN
Qty: 90 CAPSULE | Refills: 0 | Status: SHIPPED | OUTPATIENT
Start: 2019-05-29 | End: 2019-10-09

## 2019-05-29 RX ORDER — FLUCONAZOLE 150 MG/1
150 TABLET ORAL ONCE
Qty: 1 TABLET | Refills: 0 | Status: SHIPPED | OUTPATIENT
Start: 2019-05-29 | End: 2019-05-29

## 2019-08-01 ENCOUNTER — OFFICE VISIT (OUTPATIENT)
Dept: FAMILY MEDICINE | Facility: CLINIC | Age: 54
End: 2019-08-01
Payer: COMMERCIAL

## 2019-08-01 VITALS
RESPIRATION RATE: 16 BRPM | DIASTOLIC BLOOD PRESSURE: 72 MMHG | TEMPERATURE: 98.3 F | OXYGEN SATURATION: 98 % | WEIGHT: 163.5 LBS | HEIGHT: 64 IN | SYSTOLIC BLOOD PRESSURE: 112 MMHG | BODY MASS INDEX: 27.91 KG/M2 | HEART RATE: 91 BPM

## 2019-08-01 DIAGNOSIS — Z11.59 NEED FOR HEPATITIS C SCREENING TEST: Primary | ICD-10-CM

## 2019-08-01 DIAGNOSIS — B18.2 CHRONIC HEPATITIS C WITHOUT HEPATIC COMA (H): ICD-10-CM

## 2019-08-01 DIAGNOSIS — Z11.4 ENCOUNTER FOR SCREENING FOR HIV: ICD-10-CM

## 2019-08-01 DIAGNOSIS — F17.200 TOBACCO USE DISORDER: ICD-10-CM

## 2019-08-01 PROCEDURE — 36415 COLL VENOUS BLD VENIPUNCTURE: CPT | Performed by: FAMILY MEDICINE

## 2019-08-01 PROCEDURE — 87389 HIV-1 AG W/HIV-1&-2 AB AG IA: CPT | Performed by: FAMILY MEDICINE

## 2019-08-01 PROCEDURE — 99213 OFFICE O/P EST LOW 20 MIN: CPT | Performed by: FAMILY MEDICINE

## 2019-08-01 RX ORDER — VARENICLINE TARTRATE 1 MG/1
1 TABLET, FILM COATED ORAL 2 TIMES DAILY
Qty: 56 TABLET | Refills: 3 | Status: SHIPPED | OUTPATIENT
Start: 2019-08-01 | End: 2019-10-09

## 2019-08-01 ASSESSMENT — MIFFLIN-ST. JEOR: SCORE: 1326.63

## 2019-08-01 NOTE — PROGRESS NOTES
"Subjective     Shelley Lew is a 54 year old female who presents to clinic today for the following health issues:    HPI   Chief Complaint   Patient presents with     Patient Request     pt. is here today in clinic for hepatitis c screening per My Chart     Smoking Cessation     refill on chantix     Hair Loss     X within the last year pt. has been noticing this.     Health Maintenance     pt. was reminded she is due for colonoscopy      S :Shelley Lew is a 54 year old female here for screening    Hep c and hiv    Problem list, med list, additional histories reviewed and updated, as indicated.      Also with tobacco use.  Wants to quit.  chantix has helped before    Problem list, med list, additional histories reviewed and updated, as indicated.      O:/72   Pulse 91   Temp 98.3  F (36.8  C) (Tympanic)   Resp 16   Ht 1.626 m (5' 4\")   Wt 74.2 kg (163 lb 8 oz)   SpO2 98%   BMI 28.06 kg/m    GEN: Alert and oriented, in no acute distress    A: tobacco use, ongoing       HIV and hep C screening needs    P: labs.  Chantix.  F/u prn.  Doing well.      "

## 2019-08-04 LAB — HIV 1+2 AB+HIV1 P24 AG SERPL QL IA: NONREACTIVE

## 2019-08-20 DIAGNOSIS — Z11.59 NEED FOR HEPATITIS C SCREENING TEST: ICD-10-CM

## 2019-08-20 PROCEDURE — 87522 HEPATITIS C REVRS TRNSCRPJ: CPT | Performed by: FAMILY MEDICINE

## 2019-08-20 PROCEDURE — 36415 COLL VENOUS BLD VENIPUNCTURE: CPT | Performed by: FAMILY MEDICINE

## 2019-08-20 PROCEDURE — 86803 HEPATITIS C AB TEST: CPT | Performed by: FAMILY MEDICINE

## 2019-08-20 PROCEDURE — 99000 SPECIMEN HANDLING OFFICE-LAB: CPT | Performed by: FAMILY MEDICINE

## 2019-08-20 PROCEDURE — 87902 NFCT AGT GNTYP ALYS HEP C: CPT | Mod: 90 | Performed by: FAMILY MEDICINE

## 2019-08-22 LAB
HCV AB SERPL QL IA: REACTIVE
HCV RNA SERPL NAA+PROBE-ACNC: ABNORMAL [IU]/ML
HCV RNA SERPL NAA+PROBE-LOG IU: 6 LOG IU/ML

## 2019-08-28 NOTE — TELEPHONE ENCOUNTER
[facility]  RECORDS RECEIVED FROM: Internal - Diagnosis: Chronic hepatitis C without hepatic coma//referred by Seth Pollard MD//med recs in [facility]//scheduled by patient//   DATE RECEIVED: 09.04.2019   NOTES STATUS DETAILS   OFFICE NOTE from referring provider Internal 08.01.2019   OFFICE NOTES from other specialists N/A    DISCHARGE SUMMARY from hospital N/A    MEDICATION LIST Internal  Care Everywhere    LIVER BIOSPY (IF APPLICABLE)      PATHOLOGY REPORTS  N/A    IMAGING     ENDOSCOPY (IF AVAILABLE) N/A    COLONOSCOPY (IF AVAILABLE) N/A    ULTRASOUND LIVER N/A    CT OF ABDOMEN Care Everywhere 05.22.2019 02.22.2019   MRI OF LIVER N/A    FIBROSCAN, US ELASTOGRAPHY, FIBROSIS SCAN, MR ELASTOGRAPHY N/A    LABS     HEPATIC PANEL (LIVER PANEL) In process 08.26.2019   BASIC METABOLIC PANEL Care Everywhere 11.12.2018   COMPLETE METABOLIC PANEL N/A    COMPLETE BLOOD COUNT (CBC) Care Everywhere 05.21.2019   INTERNATIONAL NORMALIZED RATIO (INR) N/A    HEPATITIS C ANTIBODY N/A    HEPATITIS C VIRAL LOAD/PCR N/A    HEPATITIS C GENOTYPE N/A    HEPATITIS B SURFACE ANTIGEN N/A    HEPATITIS B SURFACE ANTIBODY N/A    HEPATITIS B DNA QUANT LEVEL N/A    HEPATITIS B CORE ANTIBODY N/A

## 2019-08-30 ENCOUNTER — TELEPHONE (OUTPATIENT)
Dept: GASTROENTEROLOGY | Facility: CLINIC | Age: 54
End: 2019-08-30

## 2019-08-30 DIAGNOSIS — B18.2 CHRONIC HEPATITIS C WITHOUT HEPATIC COMA (H): Primary | ICD-10-CM

## 2019-08-30 NOTE — TELEPHONE ENCOUNTER
Called both numbers. Mobile phone would not let the call go through because there are restrictions. Home phone number does not have voice mail set up.  Dian Horowitz on 8/30/2019 at 3:16 PM

## 2019-09-04 ENCOUNTER — OFFICE VISIT (OUTPATIENT)
Dept: GASTROENTEROLOGY | Facility: CLINIC | Age: 54
End: 2019-09-04
Attending: FAMILY MEDICINE
Payer: COMMERCIAL

## 2019-09-04 ENCOUNTER — PRE VISIT (OUTPATIENT)
Dept: GASTROENTEROLOGY | Facility: CLINIC | Age: 54
End: 2019-09-04

## 2019-09-04 VITALS
DIASTOLIC BLOOD PRESSURE: 82 MMHG | SYSTOLIC BLOOD PRESSURE: 128 MMHG | HEIGHT: 64 IN | BODY MASS INDEX: 28.12 KG/M2 | OXYGEN SATURATION: 98 % | HEART RATE: 86 BPM | WEIGHT: 164.7 LBS | RESPIRATION RATE: 18 BRPM | TEMPERATURE: 98.1 F

## 2019-09-04 DIAGNOSIS — B18.2 CHRONIC HEPATITIS C WITHOUT HEPATIC COMA (H): ICD-10-CM

## 2019-09-04 LAB
ALBUMIN SERPL-MCNC: 3.9 G/DL (ref 3.4–5)
ALP SERPL-CCNC: 115 U/L (ref 40–150)
ALT SERPL W P-5'-P-CCNC: 63 U/L (ref 0–50)
ANION GAP SERPL CALCULATED.3IONS-SCNC: 5 MMOL/L (ref 3–14)
AST SERPL W P-5'-P-CCNC: 36 U/L (ref 0–45)
BILIRUB DIRECT SERPL-MCNC: 0.1 MG/DL (ref 0–0.2)
BILIRUB SERPL-MCNC: 0.4 MG/DL (ref 0.2–1.3)
BUN SERPL-MCNC: 9 MG/DL (ref 7–30)
CALCIUM SERPL-MCNC: 9.1 MG/DL (ref 8.5–10.1)
CHLORIDE SERPL-SCNC: 105 MMOL/L (ref 94–109)
CO2 SERPL-SCNC: 28 MMOL/L (ref 20–32)
CREAT SERPL-MCNC: 0.59 MG/DL (ref 0.52–1.04)
ERYTHROCYTE [DISTWIDTH] IN BLOOD BY AUTOMATED COUNT: 12.3 % (ref 10–15)
GFR SERPL CREATININE-BSD FRML MDRD: >90 ML/MIN/{1.73_M2}
GLUCOSE SERPL-MCNC: 98 MG/DL (ref 70–99)
HCT VFR BLD AUTO: 43.1 % (ref 35–47)
HGB BLD-MCNC: 14.7 G/DL (ref 11.7–15.7)
INR PPP: 1.05 (ref 0.86–1.14)
MCH RBC QN AUTO: 32.2 PG (ref 26.5–33)
MCHC RBC AUTO-ENTMCNC: 34.1 G/DL (ref 31.5–36.5)
MCV RBC AUTO: 94 FL (ref 78–100)
PLATELET # BLD AUTO: 228 10E9/L (ref 150–450)
POTASSIUM SERPL-SCNC: 3.6 MMOL/L (ref 3.4–5.3)
PROT SERPL-MCNC: 7.9 G/DL (ref 6.8–8.8)
RBC # BLD AUTO: 4.57 10E12/L (ref 3.8–5.2)
SODIUM SERPL-SCNC: 137 MMOL/L (ref 133–144)
WBC # BLD AUTO: 4.6 10E9/L (ref 4–11)

## 2019-09-04 PROCEDURE — 80076 HEPATIC FUNCTION PANEL: CPT | Performed by: PHYSICIAN ASSISTANT

## 2019-09-04 PROCEDURE — 36415 COLL VENOUS BLD VENIPUNCTURE: CPT | Performed by: PHYSICIAN ASSISTANT

## 2019-09-04 PROCEDURE — 91200 LIVER ELASTOGRAPHY: CPT | Mod: ZF

## 2019-09-04 PROCEDURE — G0463 HOSPITAL OUTPT CLINIC VISIT: HCPCS | Mod: ZF

## 2019-09-04 PROCEDURE — 80048 BASIC METABOLIC PNL TOTAL CA: CPT | Performed by: PHYSICIAN ASSISTANT

## 2019-09-04 PROCEDURE — 85027 COMPLETE CBC AUTOMATED: CPT | Performed by: PHYSICIAN ASSISTANT

## 2019-09-04 PROCEDURE — 85610 PROTHROMBIN TIME: CPT | Performed by: PHYSICIAN ASSISTANT

## 2019-09-04 PROCEDURE — 91200 LIVER ELASTOGRAPHY: CPT

## 2019-09-04 ASSESSMENT — PAIN SCALES - GENERAL: PAINLEVEL: NO PAIN (0)

## 2019-09-04 ASSESSMENT — MIFFLIN-ST. JEOR: SCORE: 1332.07

## 2019-09-04 NOTE — NURSING NOTE
"Chief Complaint   Patient presents with     Consult     Hep C never treated       Vital signs:  Temp: 98.1  F (36.7  C) Temp src: Oral BP: 128/82 Pulse: 86   Resp: 18 SpO2: 98 %     Height: 162.6 cm (5' 4\") Weight: 74.7 kg (164 lb 11.2 oz)  Estimated body mass index is 28.27 kg/m  as calculated from the following:    Height as of this encounter: 1.626 m (5' 4\").    Weight as of this encounter: 74.7 kg (164 lb 11.2 oz).          Bertha Matthews, [facility]  9/4/2019 12:22 PM      "

## 2019-09-04 NOTE — LETTER
9/4/2019      RE: Shelley Lew  [id number] [address] [address]       Hepatology Clinic Note  Shelley Lew   Date of Birth 1965  Date of Service 9/4/2019    REASON FOR CONSULTATION: Hepatitis C  REFERRING PROVIDER: Seth Pollard MD          Assessment/plan:   Shelley Lew is a 54 year old female with Hepatitis C, genotype pending, treatment naive. Currently there are no biochemical or physical signs of cirrhosis. We discussed the natural course of Hepatitis C virus and the benefits of treating the disease. We discussed the treatment regimen and medication side effects. She does continue to smoke meth daily for energy and urged the importance of absolute abstinence from all drugs. She is agreeable to stopping. Would like her to meet with behavior therapist for help with sobriety and coping, which she is reluctant to do, but very motivated for Hepatitis C treatment. Patient would be a good candidate for Hepatitis C treatment to prevent worsening fibrosis and to prevent extrahepatic manifestations of the disease.     - Fibroscan today to evaluate for any degree of fibrosis   - Will plan to send prescription for Hepatitis C pending genotype and fibrosis and meeting with Behavior Therapist to evaluate Hep C readiness to treat and substance abuse   - Repeat HCV RNA  12-weeks after finishing treatment to determine SVR  - If patient achieves SVR, she does not need regular follow-up in Hepatology Clinic.   - Follow-up in Hepatology Clinic as needed    Darrell Irby PA-C   [facility]    -----------------------------------------------------       HPI:   Shelley Lew is a 54 year old female  presenting for evaluation and treatment of Hepatitis C.     Hepatitis C   -Genotype pending  -Diagnosed: August 2019  -History: history of tattoos, BRENDA  -Prior biopsy: No   -Prior treatments: naive     Patient states she was diagnosed with hepatitis C within the past month.  She is not sure  how she acquired the virus.  She admits to a history of tattoos done in a party setting as well as a history of intranasal drug use.  No history of recent IV or intranasal drug use.    Her appetite is normal and she eats regular meals throughout the day.  She does parents some nausea throughout the day.  No history of significant weight changes.  She has regular bowel movements.    Patient denies jaundice, lower extremity edema, abdominal distension or confusion.  Patient also denies melena, hematochezia or hematemesis.  Patient denies weight loss, fevers, sweats or chills.    PMH: History of frequent UTIs    [facility]: Cholecystectomy, laminectomy, laparoscopic tubal ligation, carpal tunnel release on right side    Medications: See below    She currently smokes about 1 pack/week and is working on quitting with Chantix.  History of significant alcohol use for a few years in her 20s otherwise no significant alcohol use.  History of intranasal drug use remote past.  She does smoke meth regularly now for energy.  She has no interest in chemical dependency treatment and states that she would be able to stop smoking if it is needed.  Currently works as a PCA.  No family history of liver disease or liver cancer.    Lab work-up thus far:   Hep C antibody positive   HCV RNA [id number]  Hep B surface antigen pending  Hep B surface antibody pending  Hep B core antibody pending    Medical hx Surgical hx   Past Medical History:   Diagnosis Date      (normal spontaneous vaginal delivery)      PONV (postoperative nausea and vomiting)      Pyelonephritis, acute     Past Surgical History:   Procedure Laterality Date     CHOLECYSTECTOMY       LAMINECTOMY LUMBAR POSTERIOR MICROSCOPIC TWO LEVELS N/A 2017    Procedure: LAMINECTOMY LUMBAR POSTERIOR MICROSCOPIC TWO LEVELS;  Surgeon: Nehemias Bangura MD;  Location: [facility] OR     LAPAROSCOPY,TUBAL CAUTERY       RELEASE CARPAL TUNNEL  2013    Procedure: RELEASE CARPAL TUNNEL;   Left carpal tunnel release  ;  Surgeon: Yovani Gonzalez MD;  Location: [facility] OR                 Medications:     Current Outpatient Medications   Medication     gabapentin (NEURONTIN) 300 MG capsule     hydrocortisone valerate (WEST-ROYA) 0.2 % external ointment     varenicline (CHANTIX STARTING MONTH PAK) 0.5 MG X 11 & 1 MG X 42 tablet     multivitamin, therapeutic with minerals (MULTI-VITAMIN) TABS     varenicline (CHANTIX) 1 MG tablet     No current facility-administered medications for this visit.             Allergies:     Allergies   Allergen Reactions     Percocet [Oxycodone-Acetaminophen] Nausea and Vomiting     Nausea vomiting and head swimming     Vicodin [Hydrocodone-Acetaminophen] Nausea and Vomiting            Social History:     Social History     Socioeconomic History     Marital status: Single     Spouse name: Not on file     Number of children: Not on file     Years of education: Not on file     Highest education level: Not on file   Occupational History     Not on file   Social Needs     Financial resource strain: Not on file     Food insecurity:     Worry: Not on file     Inability: Not on file     Transportation needs:     Medical: Not on file     Non-medical: Not on file   Tobacco Use     Smoking status: Light Tobacco Smoker     Types: Cigarettes     Smokeless tobacco: Never Used     Tobacco comment: one pack lasts a week   Substance and Sexual Activity     Alcohol use: Not Currently     Comment: OCC., few sips of wine 9/3/19     Drug use: Yes     Types: Methamphetamines     Comment: smokes, not IV     Sexual activity: Yes     Partners: Male     Birth control/protection: Surgical     Comment: tubal ligation   Lifestyle     Physical activity:     Days per week: Not on file     Minutes per session: Not on file     Stress: Not on file   Relationships     Social connections:     Talks on phone: Not on file     Gets together: Not on file     Attends Spiritism service: Not on file     Active member  "of club or organization: Not on file     Attends meetings of clubs or organizations: Not on file     Relationship status: Not on file     Intimate partner violence:     Fear of current or ex partner: Not on file     Emotionally abused: Not on file     Physically abused: Not on file     Forced sexual activity: Not on file   Other Topics Concern     Parent/sibling w/ CABG, MI or angioplasty before 65F 55M? No   Social History Narrative    ** Merged History Encounter **                 Family History:     Family History   Problem Relation Age of Onset     Cancer Sister         cervical     Cancer Sister         cervical            Review of Systems:   GEN: See HPI  HEENT: No change in vision or hearing, mouth sores, dysphagia, lymph nodes  Resp: No shortness of breath, coughing, hx of asthma  CV: No chest pain, palpitations, syncope   GI: See HPI  : No dysuria, history of stones, urine color    Skin: No rash; no pruritus or psoriasis  MS: No arthralgias, myalgias, joint swelling  Neuro: No memory changes, confusion, numbness    Heme: No difficulty clotting, bruising, bleeding  Psych:  No anxiety, depression, agitation          Physical Exam:   VS:  /82 (BP Location: Right arm, Patient Position: Sitting, Cuff Size: Adult Regular)   Pulse 86   Temp 98.1  F (36.7  C) (Oral)   Resp 18   Ht 1.626 m (5' 4\")   Wt 74.7 kg (164 lb 11.2 oz)   SpO2 98%   BMI 28.27 kg/m         Gen: A&Ox3, NAD, well developed  HEENT: non-icteric   CV: RRR, no overt murmurs  Lung: CTA Bilatererally, no wheezing or crackles.   Lym- no palpable lymphadenopathy  Abd: soft, NT, ND,  no palpable splenomegaly, liver is not palpable.   Ext: no edema, intact pulses.   Skin: No rash,  no palmar erythema, telangiectasias or jaundice  Neuro: grossly intact, no asterixis   Psych: appropriate mood and affects         Data:   Reviewed in person and significant for:    Lab Results   Component Value Date     08/15/2016      Lab Results "   Component Value Date    POTASSIUM 4.3 08/15/2016     Lab Results   Component Value Date    CHLORIDE 105 08/15/2016     Lab Results   Component Value Date    CO2 25 08/15/2016     Lab Results   Component Value Date    BUN 13 08/15/2016     Lab Results   Component Value Date    CR 0.73 08/15/2016       Lab Results   Component Value Date    WBC 7.5 03/24/2016     Lab Results   Component Value Date    HGB 13.3 03/24/2016     Lab Results   Component Value Date    HCT 39.3 03/24/2016     Lab Results   Component Value Date    MCV 92 03/24/2016     Lab Results   Component Value Date     03/24/2016       Lab Results   Component Value Date    AST 33 08/15/2016     Lab Results   Component Value Date    ALT 44 08/15/2016     No results found for: BILICONJ   Lab Results   Component Value Date    BILITOTAL 0.4 08/15/2016       Lab Results   Component Value Date    ALBUMIN 3.9 08/15/2016     Lab Results   Component Value Date    PROTTOTAL 7.3 08/15/2016      Lab Results   Component Value Date    ALKPHOS 54 08/15/2016       No results found for: INR      Darrell Irby PA-C

## 2019-09-04 NOTE — PROGRESS NOTES
Hepatology Clinic Note  Shelley Lew   Date of Birth 1965  Date of Service 9/4/2019    REASON FOR CONSULTATION: Hepatitis C  REFERRING PROVIDER: Seth Pollard MD          Assessment/plan:   Shelley Lew is a 54 year old female with Hepatitis C, genotype pending, treatment naive. Currently there are no biochemical or physical signs of cirrhosis. We discussed the natural course of Hepatitis C virus and the benefits of treating the disease. We discussed the treatment regimen and medication side effects. She does continue to smoke meth daily for energy and urged the importance of absolute abstinence from all drugs. She is agreeable to stopping. Would like her to meet with behavior therapist for help with sobriety and coping, which she is reluctant to do, but very motivated for Hepatitis C treatment. Patient would be a good candidate for Hepatitis C treatment to prevent worsening fibrosis and to prevent extrahepatic manifestations of the disease.     - Fibroscan today to evaluate for any degree of fibrosis   - Will plan to send prescription for Hepatitis C pending genotype and fibrosis and meeting with Behavior Therapist to evaluate Hep C readiness to treat and substance abuse   - Repeat HCV RNA  12-weeks after finishing treatment to determine SVR  - If patient achieves SVR, she does not need regular follow-up in Hepatology Clinic.   - Follow-up in Hepatology Clinic as needed    Darrell Irby PA-C   [facility]    -----------------------------------------------------       HPI:   Shelley Lew is a 54 year old female  presenting for evaluation and treatment of Hepatitis C.     Hepatitis C   -Genotype pending  -Diagnosed: August 2019  -History: history of tattoos, BRENDA  -Prior biopsy: No   -Prior treatments: naive     Patient states she was diagnosed with hepatitis C within the past month.  She is not sure how she acquired the virus.  She admits to a history of tattoos done in a party  setting as well as a history of intranasal drug use.  No history of recent IV or intranasal drug use.    Her appetite is normal and she eats regular meals throughout the day.  She does parents some nausea throughout the day.  No history of significant weight changes.  She has regular bowel movements.    Patient denies jaundice, lower extremity edema, abdominal distension or confusion.  Patient also denies melena, hematochezia or hematemesis.  Patient denies weight loss, fevers, sweats or chills.    PMH: History of frequent UTIs    SMH: Cholecystectomy, laminectomy, laparoscopic tubal ligation, carpal tunnel release on right side    Medications: See below    She currently smokes about 1 pack/week and is working on quitting with Re-APP.  History of significant alcohol use for a few years in her 20s otherwise no significant alcohol use.  History of intranasal drug use remote past.  She does smoke meth regularly now for energy.  She has no interest in chemical dependency treatment and states that she would be able to stop smoking if it is needed.  Currently works as a PCA.  No family history of liver disease or liver cancer.    Lab work-up thus far:   Hep C antibody positive   HCV RNA [id number]  Hep B surface antigen pending  Hep B surface antibody pending  Hep B core antibody pending    Medical hx Surgical hx   Past Medical History:   Diagnosis Date      (normal spontaneous vaginal delivery)      PONV (postoperative nausea and vomiting)      Pyelonephritis, acute     Past Surgical History:   Procedure Laterality Date     CHOLECYSTECTOMY       LAMINECTOMY LUMBAR POSTERIOR MICROSCOPIC TWO LEVELS N/A 2017    Procedure: LAMINECTOMY LUMBAR POSTERIOR MICROSCOPIC TWO LEVELS;  Surgeon: Nehemias Bangura MD;  Location: [facility]     LAPAROSCOPY,TUBAL CAUTERY       RELEASE CARPAL TUNNEL  2013    Procedure: RELEASE CARPAL TUNNEL;  Left carpal tunnel release  ;  Surgeon: Yovani Gonzalez MD;  Location: [facility] OR                  Medications:     Current Outpatient Medications   Medication     gabapentin (NEURONTIN) 300 MG capsule     hydrocortisone valerate (WEST-ROAY) 0.2 % external ointment     varenicline (CHANTIX STARTING MONTH PAK) 0.5 MG X 11 & 1 MG X 42 tablet     multivitamin, therapeutic with minerals (MULTI-VITAMIN) TABS     varenicline (CHANTIX) 1 MG tablet     No current facility-administered medications for this visit.             Allergies:     Allergies   Allergen Reactions     Percocet [Oxycodone-Acetaminophen] Nausea and Vomiting     Nausea vomiting and head swimming     Vicodin [Hydrocodone-Acetaminophen] Nausea and Vomiting            Social History:     Social History     Socioeconomic History     Marital status: Single     Spouse name: Not on file     Number of children: Not on file     Years of education: Not on file     Highest education level: Not on file   Occupational History     Not on file   Social Needs     Financial resource strain: Not on file     Food insecurity:     Worry: Not on file     Inability: Not on file     Transportation needs:     Medical: Not on file     Non-medical: Not on file   Tobacco Use     Smoking status: Light Tobacco Smoker     Types: Cigarettes     Smokeless tobacco: Never Used     Tobacco comment: one pack lasts a week   Substance and Sexual Activity     Alcohol use: Not Currently     Comment: OCC., few sips of wine 9/3/19     Drug use: Yes     Types: Methamphetamines     Comment: smokes, not IV     Sexual activity: Yes     Partners: Male     Birth control/protection: Surgical     Comment: tubal ligation   Lifestyle     Physical activity:     Days per week: Not on file     Minutes per session: Not on file     Stress: Not on file   Relationships     Social connections:     Talks on phone: Not on file     Gets together: Not on file     Attends Jehovah's witness service: Not on file     Active member of club or organization: Not on file     Attends meetings of clubs or  "organizations: Not on file     Relationship status: Not on file     Intimate partner violence:     Fear of current or ex partner: Not on file     Emotionally abused: Not on file     Physically abused: Not on file     Forced sexual activity: Not on file   Other Topics Concern     Parent/sibling w/ CABG, MI or angioplasty before 65F 55M? No   Social History Narrative    ** Merged History Encounter **                 Family History:     Family History   Problem Relation Age of Onset     Cancer Sister         cervical     Cancer Sister         cervical            Review of Systems:   GEN: See HPI  HEENT: No change in vision or hearing, mouth sores, dysphagia, lymph nodes  Resp: No shortness of breath, coughing, hx of asthma  CV: No chest pain, palpitations, syncope   GI: See HPI  : No dysuria, history of stones, urine color    Skin: No rash; no pruritus or psoriasis  MS: No arthralgias, myalgias, joint swelling  Neuro: No memory changes, confusion, numbness    Heme: No difficulty clotting, bruising, bleeding  Psych:  No anxiety, depression, agitation          Physical Exam:   VS:  /82 (BP Location: Right arm, Patient Position: Sitting, Cuff Size: Adult Regular)   Pulse 86   Temp 98.1  F (36.7  C) (Oral)   Resp 18   Ht 1.626 m (5' 4\")   Wt 74.7 kg (164 lb 11.2 oz)   SpO2 98%   BMI 28.27 kg/m        Gen: A&Ox3, NAD, well developed  HEENT: non-icteric   CV: RRR, no overt murmurs  Lung: CTA Bilatererally, no wheezing or crackles.   Lym- no palpable lymphadenopathy  Abd: soft, NT, ND,  no palpable splenomegaly, liver is not palpable.   Ext: no edema, intact pulses.   Skin: No rash,  no palmar erythema, telangiectasias or jaundice  Neuro: grossly intact, no asterixis   Psych: appropriate mood and affects         Data:   Reviewed in person and significant for:    Lab Results   Component Value Date     08/15/2016      Lab Results   Component Value Date    POTASSIUM 4.3 08/15/2016     Lab Results   Component " Value Date    CHLORIDE 105 08/15/2016     Lab Results   Component Value Date    CO2 25 08/15/2016     Lab Results   Component Value Date    BUN 13 08/15/2016     Lab Results   Component Value Date    CR 0.73 08/15/2016       Lab Results   Component Value Date    WBC 7.5 03/24/2016     Lab Results   Component Value Date    HGB 13.3 03/24/2016     Lab Results   Component Value Date    HCT 39.3 03/24/2016     Lab Results   Component Value Date    MCV 92 03/24/2016     Lab Results   Component Value Date     03/24/2016       Lab Results   Component Value Date    AST 33 08/15/2016     Lab Results   Component Value Date    ALT 44 08/15/2016     No results found for: BILICONJ   Lab Results   Component Value Date    BILITOTAL 0.4 08/15/2016       Lab Results   Component Value Date    ALBUMIN 3.9 08/15/2016     Lab Results   Component Value Date    PROTTOTAL 7.3 08/15/2016      Lab Results   Component Value Date    ALKPHOS 54 08/15/2016       No results found for: INR

## 2019-09-04 NOTE — LETTER
9/4/2019       RE: Shelley Lew  [address] [address] [address]     Dear Colleague,    Thank you for referring your patient, Shelley Lew, to the [facility] at [facility]. Please see a copy of my visit note below.    Hepatology Clinic Note  Shelley Lew   Date of Birth 1965  Date of Service 9/4/2019    REASON FOR CONSULTATION: Hepatitis C  REFERRING PROVIDER: Seth Pollard MD          Assessment/plan:   Shelley Lew is a 54 year old female with Hepatitis C, genotype pending, treatment naive. Currently there are no biochemical or physical signs of cirrhosis. We discussed the natural course of Hepatitis C virus and the benefits of treating the disease. We discussed the treatment regimen and medication side effects. She does continue to smoke meth daily for energy and urged the importance of absolute abstinence from all drugs. She is agreeable to stopping. Would like her to meet with behavior therapist for help with sobriety and coping, which she is reluctant to do, but very motivated for Hepatitis C treatment. Patient would be a good candidate for Hepatitis C treatment to prevent worsening fibrosis and to prevent extrahepatic manifestations of the disease.     - Fibroscan today to evaluate for any degree of fibrosis   - Will plan to send prescription for Hepatitis C pending genotype and fibrosis and meeting with Behavior Therapist to evaluate Hep C readiness to treat and substance abuse   - Repeat HCV RNA  12-weeks after finishing treatment to determine SVR  - If patient achieves SVR, she does not need regular follow-up in Hepatology Clinic.   - Follow-up in Hepatology Clinic as needed    Darrell Irby PA-C   [facility]    -----------------------------------------------------       HPI:   Shelley Lew is a 54 year old female  presenting for evaluation and treatment of Hepatitis C.     Hepatitis C   -Genotype  pending  -Diagnosed: 2019  -History: history of tattoos, BRENDA  -Prior biopsy: No   -Prior treatments: naive     Patient states she was diagnosed with hepatitis C within the past month.  She is not sure how she acquired the virus.  She admits to a history of tattoos done in a party setting as well as a history of intranasal drug use.  No history of recent IV or intranasal drug use.    Her appetite is normal and she eats regular meals throughout the day.  She does parents some nausea throughout the day.  No history of significant weight changes.  She has regular bowel movements.    Patient denies jaundice, lower extremity edema, abdominal distension or confusion.  Patient also denies melena, hematochezia or hematemesis.  Patient denies weight loss, fevers, sweats or chills.    PMH: History of frequent UTIs    SMH: Cholecystectomy, laminectomy, laparoscopic tubal ligation, carpal tunnel release on right side    Medications: See below    She currently smokes about 1 pack/week and is working on quitting with Chantix.  History of significant alcohol use for a few years in her 20s otherwise no significant alcohol use.  History of intranasal drug use remote past.  She does smoke meth regularly now for energy.  She has no interest in chemical dependency treatment and states that she would be able to stop smoking if it is needed.  Currently works as a PCA.  No family history of liver disease or liver cancer.    Lab work-up thus far:   Hep C antibody positive   HCV RNA [id number]  Hep B surface antigen pending  Hep B surface antibody pending  Hep B core antibody pending    Medical hx Surgical hx   Past Medical History:   Diagnosis Date      (normal spontaneous vaginal delivery)      PONV (postoperative nausea and vomiting)      Pyelonephritis, acute     Past Surgical History:   Procedure Laterality Date     CHOLECYSTECTOMY       LAMINECTOMY LUMBAR POSTERIOR MICROSCOPIC TWO LEVELS N/A 2017    Procedure:  LAMINECTOMY LUMBAR POSTERIOR MICROSCOPIC TWO LEVELS;  Surgeon: Nehemias Bangura MD;  Location: [facility]     LAPAROSCOPY,TUBAL CAUTERY       RELEASE CARPAL TUNNEL  5/21/2013    Procedure: RELEASE CARPAL TUNNEL;  Left carpal tunnel release  ;  Surgeon: Yovani Gonzalez MD;  Location: [facility]                 Medications:     Current Outpatient Medications   Medication     gabapentin (NEURONTIN) 300 MG capsule     hydrocortisone valerate (WEST-ROYA) 0.2 % external ointment     varenicline (CHANTIX STARTING MONTH PAK) 0.5 MG X 11 & 1 MG X 42 tablet     multivitamin, therapeutic with minerals (MULTI-VITAMIN) TABS     varenicline (CHANTIX) 1 MG tablet     No current facility-administered medications for this visit.             Allergies:     Allergies   Allergen Reactions     Percocet [Oxycodone-Acetaminophen] Nausea and Vomiting     Nausea vomiting and head swimming     Vicodin [Hydrocodone-Acetaminophen] Nausea and Vomiting            Social History:     Social History     Socioeconomic History     Marital status: Single     Spouse name: Not on file     Number of children: Not on file     Years of education: Not on file     Highest education level: Not on file   Occupational History     Not on file   Social Needs     Financial resource strain: Not on file     Food insecurity:     Worry: Not on file     Inability: Not on file     Transportation needs:     Medical: Not on file     Non-medical: Not on file   Tobacco Use     Smoking status: Light Tobacco Smoker     Types: Cigarettes     Smokeless tobacco: Never Used     Tobacco comment: one pack lasts a week   Substance and Sexual Activity     Alcohol use: Not Currently     Comment: OCC., few sips of wine 9/3/19     Drug use: Yes     Types: Methamphetamines     Comment: smokes, not IV     Sexual activity: Yes     Partners: Male     Birth control/protection: Surgical     Comment: tubal ligation   Lifestyle     Physical activity:     Days per week: Not on file     Minutes  "per session: Not on file     Stress: Not on file   Relationships     Social connections:     Talks on phone: Not on file     Gets together: Not on file     Attends Mandaeism service: Not on file     Active member of club or organization: Not on file     Attends meetings of clubs or organizations: Not on file     Relationship status: Not on file     Intimate partner violence:     Fear of current or ex partner: Not on file     Emotionally abused: Not on file     Physically abused: Not on file     Forced sexual activity: Not on file   Other Topics Concern     Parent/sibling w/ CABG, MI or angioplasty before 65F 55M? No   Social History Narrative    ** Merged History Encounter **                 Family History:     Family History   Problem Relation Age of Onset     Cancer Sister         cervical     Cancer Sister         cervical            Review of Systems:   GEN: See HPI  HEENT: No change in vision or hearing, mouth sores, dysphagia, lymph nodes  Resp: No shortness of breath, coughing, hx of asthma  CV: No chest pain, palpitations, syncope   GI: See HPI  : No dysuria, history of stones, urine color    Skin: No rash; no pruritus or psoriasis  MS: No arthralgias, myalgias, joint swelling  Neuro: No memory changes, confusion, numbness    Heme: No difficulty clotting, bruising, bleeding  Psych:  No anxiety, depression, agitation          Physical Exam:   VS:  /82 (BP Location: Right arm, Patient Position: Sitting, Cuff Size: Adult Regular)   Pulse 86   Temp 98.1  F (36.7  C) (Oral)   Resp 18   Ht 1.626 m (5' 4\")   Wt 74.7 kg (164 lb 11.2 oz)   SpO2 98%   BMI 28.27 kg/m         Gen: A&Ox3, NAD, well developed  HEENT: non-icteric   CV: RRR, no overt murmurs  Lung: CTA Bilatererally, no wheezing or crackles.   Lym- no palpable lymphadenopathy  Abd: soft, NT, ND,  no palpable splenomegaly, liver is not palpable.   Ext: no edema, intact pulses.   Skin: No rash,  no palmar erythema, telangiectasias or " jaundice  Neuro: grossly intact, no asterixis   Psych: appropriate mood and affects         Data:   Reviewed in person and significant for:    Lab Results   Component Value Date     08/15/2016      Lab Results   Component Value Date    POTASSIUM 4.3 08/15/2016     Lab Results   Component Value Date    CHLORIDE 105 08/15/2016     Lab Results   Component Value Date    CO2 25 08/15/2016     Lab Results   Component Value Date    BUN 13 08/15/2016     Lab Results   Component Value Date    CR 0.73 08/15/2016       Lab Results   Component Value Date    WBC 7.5 03/24/2016     Lab Results   Component Value Date    HGB 13.3 03/24/2016     Lab Results   Component Value Date    HCT 39.3 03/24/2016     Lab Results   Component Value Date    MCV 92 03/24/2016     Lab Results   Component Value Date     03/24/2016       Lab Results   Component Value Date    AST 33 08/15/2016     Lab Results   Component Value Date    ALT 44 08/15/2016     No results found for: BILICONJ   Lab Results   Component Value Date    BILITOTAL 0.4 08/15/2016       Lab Results   Component Value Date    ALBUMIN 3.9 08/15/2016     Lab Results   Component Value Date    PROTTOTAL 7.3 08/15/2016      Lab Results   Component Value Date    ALKPHOS 54 08/15/2016       No results found for: INR      Again, thank you for allowing me to participate in the care of your patient.      Sincerely,    Darrell Irby PA-C

## 2019-09-06 LAB
HCV GENTYP SERPL NAA+PROBE: NORMAL
HCV RNA SERPL NAA+PROBE-ACNC: ABNORMAL [IU]/ML
HCV RNA SERPL NAA+PROBE-LOG IU: 6 LOG IU/ML
LAB SCANNED RESULT: NORMAL

## 2019-09-26 ENCOUNTER — OFFICE VISIT (OUTPATIENT)
Dept: BEHAVIORAL HEALTH | Facility: CLINIC | Age: 54
End: 2019-09-26
Attending: PHYSICIAN ASSISTANT
Payer: COMMERCIAL

## 2019-09-26 DIAGNOSIS — F32.5 MAJOR DEPRESSION IN FULL REMISSION (H): ICD-10-CM

## 2019-09-26 DIAGNOSIS — R45.89 ANXIETY ABOUT HEALTH: Primary | ICD-10-CM

## 2019-09-26 DIAGNOSIS — F15.21 AMPHETAMINE USE DISORDER, MODERATE, IN EARLY REMISSION (H): ICD-10-CM

## 2019-09-26 ASSESSMENT — ANXIETY QUESTIONNAIRES
5. BEING SO RESTLESS THAT IT IS HARD TO SIT STILL: NOT AT ALL
7. FEELING AFRAID AS IF SOMETHING AWFUL MIGHT HAPPEN: NOT AT ALL
1. FEELING NERVOUS, ANXIOUS, OR ON EDGE: NOT AT ALL
GAD7 TOTAL SCORE: 0
3. WORRYING TOO MUCH ABOUT DIFFERENT THINGS: NOT AT ALL
GAD7 TOTAL SCORE: 0
2. NOT BEING ABLE TO STOP OR CONTROL WORRYING: NOT AT ALL
4. TROUBLE RELAXING: NOT AT ALL
6. BECOMING EASILY ANNOYED OR IRRITABLE: NOT AT ALL
GAD7 TOTAL SCORE: 0
7. FEELING AFRAID AS IF SOMETHING AWFUL MIGHT HAPPEN: NOT AT ALL

## 2019-09-26 ASSESSMENT — PATIENT HEALTH QUESTIONNAIRE - PHQ9
SUM OF ALL RESPONSES TO PHQ QUESTIONS 1-9: 1
10. IF YOU CHECKED OFF ANY PROBLEMS, HOW DIFFICULT HAVE THESE PROBLEMS MADE IT FOR YOU TO DO YOUR WORK, TAKE CARE OF THINGS AT HOME, OR GET ALONG WITH OTHER PEOPLE: NOT DIFFICULT AT ALL
SUM OF ALL RESPONSES TO PHQ QUESTIONS 1-9: 1

## 2019-09-26 NOTE — Clinical Note
Hope this is helpful. I believe she is hoping to get her treatment arranged as soon as possible. Please let me know if you need anything else from me regarding this patient. Thanks! Gerardo

## 2019-09-26 NOTE — PROGRESS NOTES
[facility] (Hepatology Clinic referral)  2019  Integrated [facility]al [facility] Services   Diagnostic Assessment      PATIENT'S NAME: Shelley Lew  MRN:   [id number]  :   1965  DATE OF SERVICE: 2019  SERVICE LOCATION: Face to Face in Clinic  Visit Activities: NEW and [facility] Only    Identifying Information:  Patient is a 54 year old year old, ,  female.  Patient attended the session alone.      Referral:  Ruthann has been seen in the hepatology clinic seeking Hepatitis C treatment, but there has been some concern about her hx of methamphetamine use. Shelley understands that she must demonstrate efforts to attain sobriety to qualify for this treatment, and we met today at her and her hepatology provider's request to discuss these concerns and determine an appropriate approach to helping her reach these goals.    Patient's Statement of Presenting Concern:  Per notes from Ruthann's visit with Darrell Irby PA-C (referral source), dated 2019:  Shelley Lew is a 54 year old female with Hepatitis C, genotype pending, treatment naive. Currently there are no biochemical or physical signs of cirrhosis. We discussed the natural course of Hepatitis C virus and the benefits of treating the disease. We discussed the treatment regimen and medication side effects. She does continue to smoke meth daily for energy and urged the importance of absolute abstinence from all drugs. She is agreeable to stopping. Would like her to meet with behavior therapist for help with sobriety and coping, which she is reluctant to do, but very motivated for Hepatitis C treatment. Patient would be a good candidate for Hepatitis C treatment to prevent worsening fibrosis and to prevent extrahepatic manifestations of the disease.     She currently smokes about 1 pack/week and is working on quitting with Chantix.  History of significant alcohol use for a few years in her 20s  otherwise no significant alcohol use.  History of intranasal drug use remote past.  She does smoke meth regularly now for energy.  She has no interest in chemical dependency treatment and states that she would be able to stop smoking if it is needed.  Currently works as a PCA.  No family history of liver disease or liver cancer.    Ruthann reports the following reason(s) for seeking an assessment at this time: follow-up per hepatology provider's recommendation to seek support for stopping substance use and managing stress related to her medical condition. Ruthann admits to a hx of intermittent methamphetamine use, but is currently highly motivated to stop use due to her concerns about her health, and a desire to be treated for Hepatitis C.     Social History:  Ruthann reported she grew up in [place], [place]. She is the second born of five children.  She reported that her childhood was mixed - both good and challenging, but denies any hx of physical, mental/emotinoal, or sexual abuse.  She went as far as the eighth grade in school and then discontinued schooling. She denies any hx of learning problems in school. She has been  once, and has been  for most of her adult life. She reports she was  less than one year and glad she ended it. She does have two adult children (a son age thirty-seven and a daughter age thirty-two), as well as four grandchildren. She reports being close to them and loving being a grandparent. She also identified having many stable and meaningful social connections. She feels well-supported, she reports.    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 She reports she enjoys her job, she says. She lives with a long-term friend in a house.   She did not serve in the . She says she has no involvements with the legal system.  There are no ethnic, cultural or Amish factors that may be relevant for therapy.      Mental Health History:  Ruthann reported no  "family history of mental health issues, \"at least that I know of.\" She says she has not received mental health services in the past, and is not currently receiving any mental health services. She says that beyond the regular ups and downs of life, she does not have any hx of mental heatlh issues herself - no hx of depression, anxiety or other concerns. There is note in the chart, however, of some treatment for depression through her PCP in 2009. She was dx with mild major depression at that time, and prescribed Zoloft.     Answers for HPI/ROS submitted by the patient on 9/26/2019   If you checked off any problems, how difficult have these problems made it for you to do your work, take care of things at home, or get along with other people?: Not difficult at all  PHQ9 TOTAL SCORE: 1  PA 7 TOTAL SCORE: 0    Chemical Health History:  Zo reported no family history of chemical health issues. She reports that she has not received chemical dependency treatment in the past, and is not currently receiving any chemical dependency treatment. Ruthann reports no problems as a result of their drinking / drug use currently. She says she has not had alcohol beyond one or two drinks a year for many years. There is report of some issues with drinking in her twenties, but she stopped due to those difficulties. She has recently quit using methamphetamines, citing her health. \"I quit using everything when I found out about this [hepatitis C].\" She says she has even been working very hard on quitting smoking cigarettes, but has mostly succeeded with that, as well.     We did review the AUDIT and DAST screening instruments today (see end of this note for full screens).    Screening Results:  NIAAA: How many times in the past year have you had 4 or more drinks in a day?   0 = Negative Screen   AUDIT: 0  DAST-10: 1    Ruthann was treated for methamphetamine use in 2014 at a visit to the ED. Methamphetamine use is noted in some visits with " her PCP, as well - noted to be intermittent, and that she has made some mildly successful attempts to stop use altogether. She has admitted to this use in her meeting with Darrell Irby PA-C in the hepatology clinic, as well. She reports today that this use has been intermittent, that she can stop when she chooses to, and that she has been highly motivated by her current concerns about hepatitis C to stop use. She reports she has been successful in stopping use and is committed to maintaining abstinence moving forward.    Significant Losses / Trauma / Abuse / Neglect Issues:  There are no indications or report of: significant losses, trauma, abuse or neglect.  Issues of possible neglect are not present.    Medical History:   Patient Active Problem List   Diagnosis     CARDIOVASCULAR SCREENING; LDL GOAL LESS THAN 160     CTS (carpal tunnel syndrome)     Health Care Home     Spinal stenosis of lumbar region with neurogenic claudication     Tobacco use disorder     Hyperlipidemia LDL goal <130     H/O drug abuse     Medication Review:  Current Outpatient Medications   Medication     gabapentin (NEURONTIN) 300 MG capsule     hydrocortisone valerate (WEST-ROYA) 0.2 % external ointment     multivitamin, therapeutic with minerals (MULTI-VITAMIN) TABS     varenicline (CHANTIX STARTING MONTH SANJU) 0.5 MG X 11 & 1 MG X 42 tablet     varenicline (CHANTIX) 1 MG tablet     No current facility-administered medications for this visit.      Patient was provided recommendation to follow-up with physician.    Mental Status Assessment:  Appearance:   Appropriate   Eye Contact:   Good   Psychomotor Behavior: Normal   Attitude:   Cooperative  Guarded   Orientation:   All  Speech   Rate / Production: Normal    Volume:  Soft   Mood:    Normal  Affect:    Appropriate  Subdued   Thought Content:  Clear   Thought Form:  Coherent  Logical   Insight:    Good     Safety Assessment:  Patient denies a history of suicidal ideation, suicide attempts,  self-injurious behavior, homicidal ideation, homicidal behavior and and other safety concerns  Patient denies current or recent suicidal ideation or behaviors.  Patient denies current or recent homicidal ideation or behaviors.  Patient denies current or recent self injurious behavior or ideation.  Patient denies other safety concerns.    Plan for Safety and Risk Management:  A safety and risk management plan has not been developed at this time, however patient was encouraged to call [place] [phone] should there be a change in any of these risk factors.    Review of Symptoms:  Depression: Energy   Lynda:  No symptoms  Psychosis: No symptoms  Anxiety: No symptoms  Panic:  No symptoms  Post Traumatic Stress Disorder: No symptoms  Obsessive Compulsive Disorder: No symptoms  Eating Disorder: No symptoms  Oppositional Defiant Disorder: No symptoms  ADD / ADHD: No symptoms  Conduct Disorder: No symptoms    Patient's Strengths and Limitations:  Patient identified the following strengths or resources that will help her succeed in counseling: commitment to health and well being, friends / good social support, insight, motivation, strong social skills and work ethic. Patient identified the following supports: family and friends. Things that may interfere with the londonen'ts success in behavioral health services include:physical health concerns.    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 This risk is moderated by the fact that she is highly motivated to  pursue Hepatitis C treatment, and has some demonstrated hx of being able to stop drug use when motivated in the past. She is also currently successfully making changes in her tobacco use. She is successfully engaged in work and social life, and has shown engagement with healthcare providers in the past, as well. I am willing to provide support in her behavior change efforts, or connect her to other resources, as needed.    Gerardo Fish MA, LMFT, [facility]    --------------------------------------------------------------------------------------------------------------------------------------------------------------    ALCOHOL USE DISORDERS IDENTIFICATION TEST (AUDIT)  Raul MELONIE, China OG, nIez TF et al. Development of the alcohol use disorders identification test (AUDIT): WHO collaborative project on early detection of persons with harmful alcohol consumption    Read questions as written. Record answers carefully. Begin the AUDIT by saying  Now I am going to ask you some questions about your use of alcoholic beverages during this past year.  Explain what is meant by  alcoholic beverages  by using local examples of beer, wine, vodka, etc. Please Note: Alcohol is inclusive of: beer, wine, liquor or any other alcoholic beverage.     1. How often do you have a drink containing alcohol?     (0) Never (1) Monthly (2) 2-4 times a month (3) 2-3 times a week (4) 4 or more times a week     2. How many drinks contain alcohol do you have on a typical day when you are drinking?     (0) 1-2 (1) 3 or 4 (2) 5 or 6 (3) 7-9 (4) 10 or more     3. How often do you have six or more drinks on one occasion?     (0) never (1) less than monthly (2) monthly (3) weekly (4) daily or almost daily     4. How often during the last year have you found that you were unable to stop drinking once you started?     (0) never (1) less than monthly (2) monthly (3) weekly (4) daily or almost daily     5. How often during the last year have you failed to do  what was normally expected of you because of drinking?     (0) never (1) less than monthly (2) monthly (3) weekly (4) daily or almost daily     6. How often during the last year have you needed a first drink in the morning to get yourself going after a heavy drinking session?     (0) never (1) less than monthly (2) monthly (3) weekly (4) daily or almost daily     7. How often during the last year have you felt guilt or remorse after drinking?     (0) never (1) less than monthly (2) monthly (3) weekly (4) daily or almost daily     8. How often during the last year have you been unable to remember what happened the night before because of drinking?     (0) never (1) less than monthly (2) monthly (3) weekly (4) daily or almost daily     9. Have you or someone else been injured as a result of your drinking?     (0) no (2) yes, but not in the last year (4) yes, during the last year     10. Has a friend, relative, or doctor or other health worker been concerned about your drinking or suggested you cut down?     (0) no (2) yes, but not in the last year (4) yes, during the last year     Total Score: 0     SCORING THE AUDIT   Scores for each question range from 0 to 4, with the first response for each question (e.g. never) scoring 0, the second (e.g. less than monthly) scoring 1, the third (e.g. monthly) scoring 2, the fourth (e.g. weekly) scoring 3, and the last response (e.g. daily or almost daily) scoring 4. For questions 9 and 10, which only have three responses, the scoring is 0, 2 and 4 (from left to right).     A score of 8 or more meets the criteria for a positive screen, refer the individual to the Qualified Professional Substance Abuse for further assessment.     (Refer an individual under age 21 with a score of 1 or more to the Qualified Professional Substance Abuse for further assessment.      DRUG ABUSE SCREENING TOOL (DAST-10)  1982 by the [employer]. Author: Yovani Tran Ph.D.   The  "following questions concern information about your possible involvement with drugs not including alcoholic beverages during the past 12 months.     \"Drug abuse\" refers to (1) the use of prescribed or over the counter drugs in excess of the directions, and (2) any nonmedical use of drugs.   The various classes of drugs may include cannabis (marijuana, hashish), solvents (e.g., paint thinner), tranquilizers (e.g., Valium), barbiturates, cocaine, stimulants (e.g., speed), hallucinogens (e.g., LSD) or narcotics (e.g., heroin). Remember that the questions do not include alcoholic beverages.     Please answer every question. If you have difficulty with a statement, then choose the response that is mostly right.     1. Have you used drugs other than those required for medical reasons?    Yes No - just twice    2. Do you abuse more than one drug at a time?       Yes No     3. Are you always able to stop using drugs when you want to?     Yes No     4. Have you ever had blackouts or flashbacks as a result of drug use?    Yes No     5. Do you ever feel bad or guilty about your drug use?      Yes No     6. Does your spouse (or parents) ever complain about your involvement with drugs? Yes No     7. Have you neglected your family because of your use of drugs?    Yes No     8. Have you engaged in illegal activities in order to obtain drugs?    Yes No     9. Have you ever experienced withdrawal symptoms (felt sick) when you stopped taking drugs?              Yes No    10. Have you had medical problems as a result of your drug use (e.g., memory loss, hepatitis, convulsions, bleeding)?          Yes No       Score: 1     SCORING THE DAST-10   For the DAST-10, score I point for each question answered ''yes,'' except for Question 3 for which a ''no point'' receives 1.     DAST-10 INTERPRETATION   Score Degree of Problems Related to Drug Abuse Suggested Action   0 No problems reported none at this time   1-2 Low level monitor, re-assess " at a later date   3-5 Moderate level further investigation   6-8 Substantial level intensive assessment   9-10 Severe level intensive assessment

## 2019-09-27 ASSESSMENT — ANXIETY QUESTIONNAIRES: GAD7 TOTAL SCORE: 0

## 2019-09-27 ASSESSMENT — PATIENT HEALTH QUESTIONNAIRE - PHQ9: SUM OF ALL RESPONSES TO PHQ QUESTIONS 1-9: 1

## 2019-10-04 ENCOUNTER — TELEPHONE (OUTPATIENT)
Dept: GASTROENTEROLOGY | Facility: CLINIC | Age: 54
End: 2019-10-04

## 2019-10-04 DIAGNOSIS — B18.2 CHRONIC HEPATITIS C WITHOUT HEPATIC COMA (H): Primary | ICD-10-CM

## 2019-10-04 NOTE — LETTER
October 22, 2019       TO: Shelley Lew  [id number] [facility] [address]       To Whom it May Concern,      Shelley Lew is a patient seen in the [facility] Hepatology clinic for the assessment of Hepatitis C and it has been decided to pursue treatment. Darrell Irby PA-C in Hepatology has discussed with Shelley Lew the use of methamphetamine and has recommended her to stop use altogether. Shelley Lew is agreeable to stopping methamphetamine use. Even if the patient does not stop, Darrell Irby PA-C is still recommending that we move forward with treatment and that the patient will be able to successfully complete treatment regardless of her history of methamphetamine use.      Please let me know if you have any further questions as well.     Clinic Staff - [phone] option 3     Sincerely,         JOSE ANGEL Son  [address], [id number]  [place]  [address].

## 2019-10-09 ENCOUNTER — OFFICE VISIT (OUTPATIENT)
Dept: FAMILY MEDICINE | Facility: CLINIC | Age: 54
End: 2019-10-09
Payer: COMMERCIAL

## 2019-10-09 VITALS
BODY MASS INDEX: 28.17 KG/M2 | SYSTOLIC BLOOD PRESSURE: 106 MMHG | RESPIRATION RATE: 20 BRPM | TEMPERATURE: 98.1 F | HEIGHT: 64 IN | DIASTOLIC BLOOD PRESSURE: 72 MMHG | HEART RATE: 91 BPM | OXYGEN SATURATION: 98 % | WEIGHT: 165 LBS

## 2019-10-09 DIAGNOSIS — G56.00 CARPAL TUNNEL SYNDROME, UNSPECIFIED LATERALITY: ICD-10-CM

## 2019-10-09 DIAGNOSIS — B18.2 CHRONIC HEPATITIS C WITHOUT HEPATIC COMA (H): ICD-10-CM

## 2019-10-09 DIAGNOSIS — Z01.818 PREOP GENERAL PHYSICAL EXAM: Primary | ICD-10-CM

## 2019-10-09 PROCEDURE — 99214 OFFICE O/P EST MOD 30 MIN: CPT | Performed by: FAMILY MEDICINE

## 2019-10-09 ASSESSMENT — MIFFLIN-ST. JEOR: SCORE: 1333.44

## 2019-10-09 NOTE — H&P (VIEW-ONLY)
[facility]  [address] [place] [id number]  [phone]  Dept: [phone]    PRE-OP EVALUATION:  Today's date: 10/9/2019    Shelley Lew (: 1965) presents for pre-operative evaluation assessment as requested by Dr. Gonzalez.  She requires evaluation and anesthesia risk assessment prior to undergoing surgery/procedure for treatment of carpal tunnel release, right .    Fax number for surgical facility:   Primary Physician: Seth Pollard  Type of Anesthesia Anticipated: to be determined    Patient has a Health Care Directive or Living Will:  NO    Preop Questions 10/3/2019   Who is doing your surgery? Yovani Gonzalez MD   What are you having done? Carpal tunnel  right hand   Date of Surgery/Procedure: 10/15/2019   Facility or Hospital where procedure/surgery will be performed: [facility]   1.  Do you have a history of Heart attack, stroke, stent, coronary bypass surgery, or other heart surgery? No   2.  Do you ever have any pain or discomfort in your chest? No   3.  Do you have a history of  Heart Failure? No   4.   Are you troubled by shortness of breath when:  walking on a level surface, or up a slight hill, or at night? No   5.  Do you currently have a cold, bronchitis or other respiratory infection? No   6.  Do you have a cough, shortness of breath, or wheezing? No   7.  Do you sometimes get pains in the calves of your legs when you walk? No   8. Do you or anyone in your family have previous history of blood clots? No   9.  Do you or does anyone in your family have a serious bleeding problem such as prolonged bleeding following surgeries or cuts? No   10. Have you ever had problems with anemia or been told to take iron pills? No   11. Have you had any abnormal blood loss such as black, tarry or bloody stools, or abnormal vaginal bleeding? No   12. Have you ever had a blood transfusion? No   13. Have you or any of your relatives ever had problems with  anesthesia? No   14. Do you have sleep apnea, excessive snoring or daytime drowsiness? No   15. Do you have any prosthetic heart valves? No   16. Do you have prosthetic joints? No   17. Is there any chance that you may be pregnant? No         HPI:     HPI related to upcoming procedure: carpal tunnel.  Having surgery    Intermittent meth use: not currently using  Off tobacco right now  No other medication use, hasn't started hep C treatment yet.        No cp or sob.  No belly pain or urine changes or stool changes.  No rash.      MEDICAL HISTORY:     Patient Active Problem List    Diagnosis Date Noted     Hyperlipidemia LDL goal <130 07/06/2017     Priority: Medium     H/O drug abuse (H) 07/06/2017     Priority: Medium     Intermittent meth use        Spinal stenosis of lumbar region with neurogenic claudication 02/16/2017     Priority: Medium     Spine surg referral.  Many MRI findings.  Severely limited by this.    Intermittent meth use has been an issue for her, would be careful with opioids.         Tobacco use disorder 02/16/2017     Priority: Medium     Health Care Home 03/31/2016     Priority: Medium       Status:  Unable to reach  Care Coordinator:  Nabila Coffman    See Letters for [facility] Care Plan  Date:  March 31, 2016           CTS (carpal tunnel syndrome) 05/15/2013     Priority: Medium     CARDIOVASCULAR SCREENING; LDL GOAL LESS THAN 160 10/31/2010     Priority: Medium        Past Surgical History:   Procedure Laterality Date     CHOLECYSTECTOMY       LAMINECTOMY LUMBAR POSTERIOR MICROSCOPIC TWO LEVELS N/A 4/11/2017    Procedure: LAMINECTOMY LUMBAR POSTERIOR MICROSCOPIC TWO LEVELS;  Surgeon: Nehemias Bangura MD;  Location: [facility] OR     LAPAROSCOPY,TUBAL CAUTERY       RELEASE CARPAL TUNNEL  5/21/2013    Procedure: RELEASE CARPAL TUNNEL;  Left carpal tunnel release  ;  Surgeon: Yovani Gonzalez MD;  Location: [facility] OR     Current Outpatient Medications   Medication Sig Dispense Refill      "glecaprevir-pibrentasvir (MAVYRET) 100-40 MG per tablet Take 3 tablets by mouth daily (Patient not taking: Reported on 10/9/2019) 84 tablet 1     hydrocortisone valerate (WEST-ROYA) 0.2 % external ointment Apply topically 2 times daily (Patient not taking: Reported on 10/9/2019) 15 g 1     multivitamin, therapeutic with minerals (MULTI-VITAMIN) TABS Take 1 tablet by mouth daily Reported on 5/23/2017       none    Allergies   Allergen Reactions     Percocet [Oxycodone-Acetaminophen] Nausea and Vomiting     Nausea vomiting and head swimming     Vicodin [Hydrocodone-Acetaminophen] Nausea and Vomiting      Latex Allergy: NO    Social History     Tobacco Use     Smoking status: Light Tobacco Smoker     Types: Cigarettes     Smokeless tobacco: Never Used     Tobacco comment: one pack lasts a week   Substance Use Topics     Alcohol use: Not Currently     Comment: OCC., few sips of wine 9/3/19     History   Drug Use     Types: Methamphetamines     Comment: smokes, not IV       REVIEW OF SYSTEMS:   See above     EXAM:   /72   Pulse 91   Temp 98.1  F (36.7  C) (Tympanic)   Resp 20   Ht 1.626 m (5' 4\")   Wt 74.8 kg (165 lb)   SpO2 98%   BMI 28.32 kg/m    GEN: Alert and oriented, in no acute distress  CV: RRR, no murmur  RESP: lungs clear bilaterally, good effort  EXT: no edema or lesions noted in lower extremities  Neck: neck supple without mass or lymphadenopathy  ENT: oropharynx clear, no exudate or palate/tonsil asymmetry      DIAGNOSTICS:   None needed     IMPRESSION:   Well exam  Carpal tunnel   Hep C, not yet treated     Proceed.  No further workup needed for this surgery.    The proposed surgical procedure is considered LOW risk.    REVISED CARDIAC RISK INDEX  The patient has the following serious cardiovascular risks for perioperative complications such as (MI, PE, VFib and 3  AV Block):  No serious cardiac risks  INTERPRETATION: 0 risks: Class I (very low risk - 0.4% complication rate)    The patient has " the following additional risks for perioperative complications:          RECOMMENDATIONS:     Proceed.      APPROVAL GIVEN to proceed with proposed procedure, without further diagnostic evaluation       Signed Electronically by: Seth Pollard MD    Copy of this evaluation report is provided to requesting physician.

## 2019-10-09 NOTE — NURSING NOTE
"Chief Complaint   Patient presents with     Pre-Op Exam       Initial /72   Pulse 91   Temp 98.1  F (36.7  C) (Tympanic)   Resp 20   Ht 1.626 m (5' 4\")   Wt 74.8 kg (165 lb)   SpO2 98%   BMI 28.32 kg/m   Estimated body mass index is 28.32 kg/m  as calculated from the following:    Height as of this encounter: 1.626 m (5' 4\").    Weight as of this encounter: 74.8 kg (165 lb).    Patient presents to the clinic using No DME    Health Maintenance that is potentially due pending provider review:  Flu shot today.    Is there anyone who you would like to be able to receive your results? No  If yes have patient fill out MARLEN    Anu Rea CMA(AAMA)        "

## 2019-10-09 NOTE — PROGRESS NOTES
[facility]  [address] [place] [id number]  [phone]  Dept: [phone]    PRE-OP EVALUATION:  Today's date: 10/9/2019    Shelley Lew (: 1965) presents for pre-operative evaluation assessment as requested by Dr. Gonzalez.  She requires evaluation and anesthesia risk assessment prior to undergoing surgery/procedure for treatment of carpal tunnel release, right .    Fax number for surgical facility:   Primary Physician: Seth Pollard  Type of Anesthesia Anticipated: to be determined    Patient has a Health Care Directive or Living Will:  NO    Preop Questions 10/3/2019   Who is doing your surgery? Yovani Gonzalez MD   What are you having done? Carpal tunnel  right hand   Date of Surgery/Procedure: 10/15/2019   Facility or Hospital where procedure/surgery will be performed: [facility]   1.  Do you have a history of Heart attack, stroke, stent, coronary bypass surgery, or other heart surgery? No   2.  Do you ever have any pain or discomfort in your chest? No   3.  Do you have a history of  Heart Failure? No   4.   Are you troubled by shortness of breath when:  walking on a level surface, or up a slight hill, or at night? No   5.  Do you currently have a cold, bronchitis or other respiratory infection? No   6.  Do you have a cough, shortness of breath, or wheezing? No   7.  Do you sometimes get pains in the calves of your legs when you walk? No   8. Do you or anyone in your family have previous history of blood clots? No   9.  Do you or does anyone in your family have a serious bleeding problem such as prolonged bleeding following surgeries or cuts? No   10. Have you ever had problems with anemia or been told to take iron pills? No   11. Have you had any abnormal blood loss such as black, tarry or bloody stools, or abnormal vaginal bleeding? No   12. Have you ever had a blood transfusion? No   13. Have you or any of your relatives ever had problems with  anesthesia? No   14. Do you have sleep apnea, excessive snoring or daytime drowsiness? No   15. Do you have any prosthetic heart valves? No   16. Do you have prosthetic joints? No   17. Is there any chance that you may be pregnant? No         HPI:     HPI related to upcoming procedure: carpal tunnel.  Having surgery    Intermittent meth use: not currently using  Off tobacco right now  No other medication use, hasn't started hep C treatment yet.        No cp or sob.  No belly pain or urine changes or stool changes.  No rash.      MEDICAL HISTORY:     Patient Active Problem List    Diagnosis Date Noted     Hyperlipidemia LDL goal <130 07/06/2017     Priority: Medium     H/O drug abuse (H) 07/06/2017     Priority: Medium     Intermittent meth use        Spinal stenosis of lumbar region with neurogenic claudication 02/16/2017     Priority: Medium     Spine surg referral.  Many MRI findings.  Severely limited by this.    Intermittent meth use has been an issue for her, would be careful with opioids.         Tobacco use disorder 02/16/2017     Priority: Medium     Health Care Home 03/31/2016     Priority: Medium       Status:  Unable to reach  Care Coordinator:  Nabila Coffman    See Letters for [facility] Care Plan  Date:  March 31, 2016           CTS (carpal tunnel syndrome) 05/15/2013     Priority: Medium     CARDIOVASCULAR SCREENING; LDL GOAL LESS THAN 160 10/31/2010     Priority: Medium        Past Surgical History:   Procedure Laterality Date     CHOLECYSTECTOMY       LAMINECTOMY LUMBAR POSTERIOR MICROSCOPIC TWO LEVELS N/A 4/11/2017    Procedure: LAMINECTOMY LUMBAR POSTERIOR MICROSCOPIC TWO LEVELS;  Surgeon: Nehemias Bangura MD;  Location: [facility]     LAPAROSCOPY,TUBAL CAUTERY       RELEASE CARPAL TUNNEL  5/21/2013    Procedure: RELEASE CARPAL TUNNEL;  Left carpal tunnel release  ;  Surgeon: Yovani Gonzalez MD;  Location: [facility] OR     Current Outpatient Medications   Medication Sig Dispense Refill      "glecaprevir-pibrentasvir (MAVYRET) 100-40 MG per tablet Take 3 tablets by mouth daily (Patient not taking: Reported on 10/9/2019) 84 tablet 1     hydrocortisone valerate (WEST-ROYA) 0.2 % external ointment Apply topically 2 times daily (Patient not taking: Reported on 10/9/2019) 15 g 1     multivitamin, therapeutic with minerals (MULTI-VITAMIN) TABS Take 1 tablet by mouth daily Reported on 5/23/2017       none    Allergies   Allergen Reactions     Percocet [Oxycodone-Acetaminophen] Nausea and Vomiting     Nausea vomiting and head swimming     Vicodin [Hydrocodone-Acetaminophen] Nausea and Vomiting      Latex Allergy: NO    Social History     Tobacco Use     Smoking status: Light Tobacco Smoker     Types: Cigarettes     Smokeless tobacco: Never Used     Tobacco comment: one pack lasts a week   Substance Use Topics     Alcohol use: Not Currently     Comment: OCC., few sips of wine 9/3/19     History   Drug Use     Types: Methamphetamines     Comment: smokes, not IV       REVIEW OF SYSTEMS:   See above     EXAM:   /72   Pulse 91   Temp 98.1  F (36.7  C) (Tympanic)   Resp 20   Ht 1.626 m (5' 4\")   Wt 74.8 kg (165 lb)   SpO2 98%   BMI 28.32 kg/m    GEN: Alert and oriented, in no acute distress  CV: RRR, no murmur  RESP: lungs clear bilaterally, good effort  EXT: no edema or lesions noted in lower extremities  Neck: neck supple without mass or lymphadenopathy  ENT: oropharynx clear, no exudate or palate/tonsil asymmetry      DIAGNOSTICS:   None needed     IMPRESSION:   Well exam  Carpal tunnel   Hep C, not yet treated     Proceed.  No further workup needed for this surgery.    The proposed surgical procedure is considered LOW risk.    REVISED CARDIAC RISK INDEX  The patient has the following serious cardiovascular risks for perioperative complications such as (MI, PE, VFib and 3  AV Block):  No serious cardiac risks  INTERPRETATION: 0 risks: Class I (very low risk - 0.4% complication rate)    The patient has " the following additional risks for perioperative complications:          RECOMMENDATIONS:     Proceed.      APPROVAL GIVEN to proceed with proposed procedure, without further diagnostic evaluation       Signed Electronically by: Seth Pollard MD    Copy of this evaluation report is provided to requesting physician.

## 2019-10-14 ENCOUNTER — ANESTHESIA EVENT (OUTPATIENT)
Dept: SURGERY | Facility: CLINIC | Age: 54
End: 2019-10-14
Payer: COMMERCIAL

## 2019-10-15 ENCOUNTER — ANESTHESIA (OUTPATIENT)
Dept: SURGERY | Facility: CLINIC | Age: 54
End: 2019-10-15
Payer: COMMERCIAL

## 2019-10-15 ENCOUNTER — HOSPITAL ENCOUNTER (OUTPATIENT)
Facility: CLINIC | Age: 54
Discharge: HOME | End: 2019-10-15
Attending: ORTHOPAEDIC SURGERY | Admitting: ORTHOPAEDIC SURGERY
Payer: COMMERCIAL

## 2019-10-15 VITALS
DIASTOLIC BLOOD PRESSURE: 68 MMHG | RESPIRATION RATE: 16 BRPM | HEIGHT: 64 IN | SYSTOLIC BLOOD PRESSURE: 121 MMHG | BODY MASS INDEX: 28.17 KG/M2 | TEMPERATURE: 98.6 F | OXYGEN SATURATION: 99 % | HEART RATE: 66 BPM | WEIGHT: 165 LBS

## 2019-10-15 DIAGNOSIS — G56.01 CARPAL TUNNEL SYNDROME OF RIGHT WRIST: Primary | ICD-10-CM

## 2019-10-15 PROCEDURE — 25000132 ZZH RX MED GY IP 250 OP 250 PS 637: Performed by: PHYSICIAN ASSISTANT

## 2019-10-15 PROCEDURE — 25000128 H RX IP 250 OP 636: Performed by: ORTHOPAEDIC SURGERY

## 2019-10-15 PROCEDURE — 25000128 H RX IP 250 OP 636: Performed by: NURSE ANESTHETIST, CERTIFIED REGISTERED

## 2019-10-15 PROCEDURE — 36000052 ZZH SURGERY LEVEL 2 EA 15 ADDTL MIN: Performed by: ORTHOPAEDIC SURGERY

## 2019-10-15 PROCEDURE — 25000128 H RX IP 250 OP 636: Performed by: PHYSICIAN ASSISTANT

## 2019-10-15 PROCEDURE — 40000306 ZZH STATISTIC PRE PROC ASSESS II: Performed by: ORTHOPAEDIC SURGERY

## 2019-10-15 PROCEDURE — 36000050 ZZH SURGERY LEVEL 2 1ST 30 MIN: Performed by: ORTHOPAEDIC SURGERY

## 2019-10-15 PROCEDURE — 37000008 ZZH ANESTHESIA TECHNICAL FEE, 1ST 30 MIN: Performed by: ORTHOPAEDIC SURGERY

## 2019-10-15 PROCEDURE — 27210794 ZZH OR GENERAL SUPPLY STERILE: Performed by: ORTHOPAEDIC SURGERY

## 2019-10-15 PROCEDURE — 25800030 ZZH RX IP 258 OP 636: Performed by: NURSE ANESTHETIST, CERTIFIED REGISTERED

## 2019-10-15 PROCEDURE — 71000027 ZZH RECOVERY PHASE 2 EACH 15 MINS: Performed by: ORTHOPAEDIC SURGERY

## 2019-10-15 PROCEDURE — 25000125 ZZHC RX 250: Performed by: ORTHOPAEDIC SURGERY

## 2019-10-15 PROCEDURE — 37000009 ZZH ANESTHESIA TECHNICAL FEE, EACH ADDTL 15 MIN: Performed by: ORTHOPAEDIC SURGERY

## 2019-10-15 PROCEDURE — 25000125 ZZHC RX 250: Performed by: NURSE ANESTHETIST, CERTIFIED REGISTERED

## 2019-10-15 RX ORDER — NALOXONE HYDROCHLORIDE 0.4 MG/ML
.1-.4 INJECTION, SOLUTION INTRAMUSCULAR; INTRAVENOUS; SUBCUTANEOUS
Status: DISCONTINUED | OUTPATIENT
Start: 2019-10-15 | End: 2019-10-15 | Stop reason: HOSPADM

## 2019-10-15 RX ORDER — ACETAMINOPHEN 325 MG/1
650 TABLET ORAL
Status: DISCONTINUED | OUTPATIENT
Start: 2019-10-15 | End: 2019-10-15 | Stop reason: HOSPADM

## 2019-10-15 RX ORDER — BUPIVACAINE HYDROCHLORIDE 5 MG/ML
INJECTION, SOLUTION PERINEURAL PRN
Status: DISCONTINUED | OUTPATIENT
Start: 2019-10-15 | End: 2019-10-15 | Stop reason: HOSPADM

## 2019-10-15 RX ORDER — ONDANSETRON 4 MG/1
4 TABLET, ORALLY DISINTEGRATING ORAL
Status: DISCONTINUED | OUTPATIENT
Start: 2019-10-15 | End: 2019-10-15 | Stop reason: HOSPADM

## 2019-10-15 RX ORDER — SODIUM CHLORIDE, SODIUM LACTATE, POTASSIUM CHLORIDE, CALCIUM CHLORIDE 600; 310; 30; 20 MG/100ML; MG/100ML; MG/100ML; MG/100ML
INJECTION, SOLUTION INTRAVENOUS CONTINUOUS
Status: DISCONTINUED | OUTPATIENT
Start: 2019-10-15 | End: 2019-10-15 | Stop reason: HOSPADM

## 2019-10-15 RX ORDER — GABAPENTIN 300 MG/1
300 CAPSULE ORAL
Status: COMPLETED | OUTPATIENT
Start: 2019-10-15 | End: 2019-10-15

## 2019-10-15 RX ORDER — HYDROCODONE BITARTRATE AND ACETAMINOPHEN 5; 325 MG/1; MG/1
1 TABLET ORAL EVERY 4 HOURS PRN
Qty: 10 TABLET | Refills: 0 | Status: SHIPPED | OUTPATIENT
Start: 2019-10-15 | End: 2020-08-17

## 2019-10-15 RX ORDER — CEFAZOLIN SODIUM 1 G/50ML
1 INJECTION, SOLUTION INTRAVENOUS SEE ADMIN INSTRUCTIONS
Status: DISCONTINUED | OUTPATIENT
Start: 2019-10-15 | End: 2019-10-15 | Stop reason: HOSPADM

## 2019-10-15 RX ORDER — FENTANYL CITRATE 50 UG/ML
INJECTION, SOLUTION INTRAMUSCULAR; INTRAVENOUS PRN
Status: DISCONTINUED | OUTPATIENT
Start: 2019-10-15 | End: 2019-10-15

## 2019-10-15 RX ORDER — PROPOFOL 10 MG/ML
INJECTION, EMULSION INTRAVENOUS CONTINUOUS PRN
Status: DISCONTINUED | OUTPATIENT
Start: 2019-10-15 | End: 2019-10-15

## 2019-10-15 RX ORDER — ONDANSETRON 2 MG/ML
INJECTION INTRAMUSCULAR; INTRAVENOUS PRN
Status: DISCONTINUED | OUTPATIENT
Start: 2019-10-15 | End: 2019-10-15

## 2019-10-15 RX ORDER — HYDROCODONE BITARTRATE AND ACETAMINOPHEN 5; 325 MG/1; MG/1
1 TABLET ORAL
Status: DISCONTINUED | OUTPATIENT
Start: 2019-10-15 | End: 2019-10-15 | Stop reason: HOSPADM

## 2019-10-15 RX ORDER — CEFAZOLIN SODIUM 2 G/100ML
2 INJECTION, SOLUTION INTRAVENOUS
Status: COMPLETED | OUTPATIENT
Start: 2019-10-15 | End: 2019-10-15

## 2019-10-15 RX ORDER — ONDANSETRON 4 MG/1
4-8 TABLET, ORALLY DISINTEGRATING ORAL EVERY 8 HOURS PRN
Qty: 16 TABLET | Refills: 0
Start: 2019-10-15 | End: 2020-03-16

## 2019-10-15 RX ORDER — LIDOCAINE HYDROCHLORIDE 10 MG/ML
INJECTION, SOLUTION INFILTRATION; PERINEURAL PRN
Status: DISCONTINUED | OUTPATIENT
Start: 2019-10-15 | End: 2019-10-15 | Stop reason: HOSPADM

## 2019-10-15 RX ORDER — DEXAMETHASONE SODIUM PHOSPHATE 4 MG/ML
INJECTION, SOLUTION INTRA-ARTICULAR; INTRALESIONAL; INTRAMUSCULAR; INTRAVENOUS; SOFT TISSUE PRN
Status: DISCONTINUED | OUTPATIENT
Start: 2019-10-15 | End: 2019-10-15

## 2019-10-15 RX ORDER — ACETAMINOPHEN 325 MG/1
975 TABLET ORAL ONCE
Status: COMPLETED | OUTPATIENT
Start: 2019-10-15 | End: 2019-10-15

## 2019-10-15 RX ORDER — LIDOCAINE 40 MG/G
CREAM TOPICAL
Status: DISCONTINUED | OUTPATIENT
Start: 2019-10-15 | End: 2019-10-15 | Stop reason: HOSPADM

## 2019-10-15 RX ADMIN — CEFAZOLIN SODIUM 2 G: 2 INJECTION, SOLUTION INTRAVENOUS at 14:49

## 2019-10-15 RX ADMIN — ACETAMINOPHEN 975 MG: 325 TABLET, FILM COATED ORAL at 13:34

## 2019-10-15 RX ADMIN — MIDAZOLAM 2 MG: 1 INJECTION INTRAMUSCULAR; INTRAVENOUS at 14:49

## 2019-10-15 RX ADMIN — ONDANSETRON 4 MG: 2 INJECTION INTRAMUSCULAR; INTRAVENOUS at 15:05

## 2019-10-15 RX ADMIN — PROPOFOL 100 MCG/KG/MIN: 10 INJECTION, EMULSION INTRAVENOUS at 14:55

## 2019-10-15 RX ADMIN — DEXAMETHASONE SODIUM PHOSPHATE 4 MG: 4 INJECTION, SOLUTION INTRA-ARTICULAR; INTRALESIONAL; INTRAMUSCULAR; INTRAVENOUS; SOFT TISSUE at 15:00

## 2019-10-15 RX ADMIN — GABAPENTIN 300 MG: 300 CAPSULE ORAL at 13:34

## 2019-10-15 RX ADMIN — FENTANYL CITRATE 100 MCG: 50 INJECTION, SOLUTION INTRAMUSCULAR; INTRAVENOUS at 14:53

## 2019-10-15 RX ADMIN — SODIUM CHLORIDE, POTASSIUM CHLORIDE, SODIUM LACTATE AND CALCIUM CHLORIDE 1000 ML: 600; 310; 30; 20 INJECTION, SOLUTION INTRAVENOUS at 13:38

## 2019-10-15 RX ADMIN — LIDOCAINE HYDROCHLORIDE 1 ML: 10 INJECTION, SOLUTION EPIDURAL; INFILTRATION; INTRACAUDAL; PERINEURAL at 13:38

## 2019-10-15 ASSESSMENT — MIFFLIN-ST. JEOR: SCORE: 1333.44

## 2019-10-15 NOTE — DISCHARGE INSTRUCTIONS
Same Day Surgery Discharge Instructions  Special Precautions After Surgery - Adult    1. It is not unusual to feel lightheaded or faint, up to 24 hours after surgery or while taking pain medication.  If you have these symptoms; sit for a few minutes before standing and have someone assist you when getting up.  2. You should rest and relax for the next 24 hours and must have someone stay with you for at least 24 hours after your discharge.  3. DO NOT DRIVE any vehicle or operate mechanical equipment for 24 hours following the end of your surgery.  DO NOT DRIVE while taking narcotic pain medications that have been prescribed by your physician.  If you had a limb operated on, you must be able to use it fully to drive.  4. DO NOT drink alcoholic beverages for 24 hours following surgery or while taking prescription pain medication.  5. Drink clear liquids (apple juice, ginger ale, broth, 7-Up, etc.).  Progress to your regular diet as you feel able.  6. Any questions call your physician and do not make important decisions for 24 hours.    ACTIVITY  ? Rest today.  No activity or diet restrictions.  ? Resume activity as tolerated.  ? No lifting more than 5 pounds for 2 weeks.     INCISIONAL CARE  ? May remove dressing in 7 days.  ? May bathe / shower after 24 hours.  ? Keep incision dry for 7 days   ? Keep extremity elevated above the level of the heart if possible.  Check color and feeling of right arm and fingers.  ? Apply ice 1/2 hour on and 1/2 hour off for first 72 hours.  ? Be alert for signs of infection:  redness, swelling, heat, drainage of pus, and/or elevated temperature.  Contact your doctor if these occur.        Call for an appointment to return to the clinic in 10-14 days.    Medications:  ? Hydrocodone (Norco, Vicodin):  Next dose: any time. Take as needed especially at bedtime. Stay ahead of the pain.  ? Ibuprofen (Motrin, Advil):  Next dose: any time for mild pain.  ? zofran Next dose:  as needed for nausea postop  ? Stool softener while on pain pills to help prevent constipation  ? Follow the instructions on the bottle.     Additional discharge instructions: call with any questions and or concerns  __________________________________________________________________________________________________________________________________  IMPORTANT NUMBERS:    [facility] Main Number:  [phone], [phone]  Pharmacy:  [phone]  Same Day Surgery:  [phone], Monday - Friday until 8:30 p.m.  Urgent Care:  [phone]  Emergency Room:  [phone]      [facility]:  [phone]                                                                             [facility]:  [phone] option 1  [facility]:  [phone]     [facility]:  [phone]   Sur[facility] Specialty Clinic:  [phone]   Home Medical Equipment: [phone]  [facility]:  [phone]

## 2019-10-15 NOTE — ANESTHESIA POSTPROCEDURE EVALUATION
Patient: Shelley Lew    Procedure(s):  Open Carpal Tunnel Release    Diagnosis:right carpal tunnel syndrome  Diagnosis Additional Information: No value filed.    Anesthesia Type:  MAC    Note:  Anesthesia Post Evaluation    Patient location during evaluation: Phase 2  Patient participation: Able to fully participate in evaluation  Level of consciousness: awake and alert  Pain management: adequate  Airway patency: patent  Cardiovascular status: acceptable and hemodynamically stable  Respiratory status: acceptable, spontaneous ventilation and room air  Hydration status: acceptable  PONV: none     Anesthetic complications: None          Last vitals:  Vitals:    10/15/19 1630 10/15/19 1640 10/15/19 1650   BP: 120/72 122/68 121/68   Pulse: 66     Resp:  16 16   Temp:      SpO2: 99% 99% 99%         Electronically Signed By: ROBERTA Vences CRNA  October 15, 2019  6:05 PM

## 2019-10-15 NOTE — OR NURSING
To sds postop. Vss. Iv patent. Denies pain but is very sleepy. Fingers mobile. Ace wrap right wrist. Arm elevated and ice to site. Friend at bedside. Will cont to reassess.

## 2019-10-15 NOTE — OR NURSING
Wants to get up to chair and go home. Vss. Iv patent. Remains nsr on monitor. States pain is gone. No pain in arm. Arm remains d/i. Ice applied. Denies needs just is hungry.abhi with pt and no further questions or concerns. Awake and no c/o. Aware to call or come in if any further pain or sob or dyspnea or change in status.

## 2019-10-15 NOTE — ANESTHESIA PREPROCEDURE EVALUATION
Anesthesia Pre-Procedure Evaluation    Patient: Shelley Lew   MRN: [id number] : 1965          Preoperative Diagnosis: right carpal tunnel syndrome    Procedure(s):  Open Carpal Tunnel Release    Past Medical History:   Diagnosis Date      (normal spontaneous vaginal delivery)     X2     PONV (postoperative nausea and vomiting)      Pyelonephritis, acute      Past Surgical History:   Procedure Laterality Date     CHOLECYSTECTOMY       LAMINECTOMY LUMBAR POSTERIOR MICROSCOPIC TWO LEVELS N/A 2017    Procedure: LAMINECTOMY LUMBAR POSTERIOR MICROSCOPIC TWO LEVELS;  Surgeon: Nehemias Bangura MD;  Location: [facility] OR     LAPAROSCOPY,TUBAL CAUTERY       RELEASE CARPAL TUNNEL  2013    Procedure: RELEASE CARPAL TUNNEL;  Left carpal tunnel release  ;  Surgeon: Yovani Gonzalez MD;  Location: [facility] OR       Anesthesia Evaluation     . Pt has had prior anesthetic.     History of anesthetic complications   - PONV        ROS/MED HX    ENT/Pulmonary:  - neg pulmonary ROS     Neurologic:  - neg neurologic ROS     Cardiovascular:  - neg cardiovascular ROS       METS/Exercise Tolerance:  >4 METS   Hematologic:  - neg hematologic  ROS       Musculoskeletal:   (+)  other musculoskeletal- CTS      GI/Hepatic:     (+) hepatitis type C, liver disease,       Renal/Genitourinary:     (+) chronic renal disease,       Endo:  - neg endo ROS       Psychiatric:  - neg psychiatric ROS       Infectious Disease:  - neg infectious disease ROS       Malignancy:      - no malignancy   Other:    - neg other ROS                      Physical Exam  Normal systems: cardiovascular, pulmonary and dental    Airway   Mallampati: I  TM distance: >3 FB  Neck ROM: full    Dental     Cardiovascular       Pulmonary             Lab Results   Component Value Date    WBC 4.6 2019    HGB 14.7 2019    HCT 43.1 2019     2019    CRP <2.9 10/20/2014     2019    POTASSIUM 3.6 2019    CHLORIDE 105  "09/04/2019    CO2 28 09/04/2019    BUN 9 09/04/2019    CR 0.59 09/04/2019    GLC 98 09/04/2019    LANDON 9.1 09/04/2019    ALBUMIN 3.9 09/04/2019    PROTTOTAL 7.9 09/04/2019    ALT 63 (H) 09/04/2019    AST 36 09/04/2019    ALKPHOS 115 09/04/2019    BILITOTAL 0.4 09/04/2019    INR 1.05 09/04/2019    TSH 0.76 03/24/2016    HCG Negative 04/11/2017       Preop Vitals  BP Readings from Last 3 Encounters:   10/15/19 115/65   10/09/19 106/72   09/04/19 128/82    Pulse Readings from Last 3 Encounters:   10/15/19 88   10/09/19 91   09/04/19 86      Resp Readings from Last 3 Encounters:   10/15/19 20   10/09/19 20   09/04/19 18    SpO2 Readings from Last 3 Encounters:   10/15/19 99%   10/09/19 98%   09/04/19 98%      Temp Readings from Last 1 Encounters:   10/15/19 36.6  C (97.8  F) (Oral)    Ht Readings from Last 1 Encounters:   10/15/19 1.626 m (5' 4\")      Wt Readings from Last 1 Encounters:   10/15/19 74.8 kg (165 lb)    Estimated body mass index is 28.32 kg/m  as calculated from the following:    Height as of this encounter: 1.626 m (5' 4\").    Weight as of this encounter: 74.8 kg (165 lb).       Anesthesia Plan      History & Physical Review      ASA Status:  2 .    NPO Status:  > 8 hours    Plan for MAC with Propofol and Intravenous induction. Maintenance will be Balanced.  Reason for MAC:  Deep or markedly invasive procedure (G8)  PONV prophylaxis:  Ondansetron (or other 5HT-3) and Dexamethasone or Solumedrol       Postoperative Care  Postoperative pain management:  IV analgesics and Oral pain medications.      Consents  Anesthetic plan, risks, benefits and alternatives discussed with:  Patient..                 ROBERTA Manuel CRNA  "

## 2019-10-15 NOTE — OP NOTE
[facility]   Operative Note    SURGEON:  Yovani Gonzalez M.D.    ASSISTANT:  Katy Barnes PA-C    PREOPERATIVE DIAGNOSIS  right carpal tunnel syndrome.    POSTOPERATIVE DIAGNOSIS  right carpal tunnel syndrome.    OPERATION  right open carpal tunnel release.    ANESTHESIA  Local anesthesia with sedation    ESTIMATED BLOOD LOSS  * No values recorded between 10/15/2019  3:08 PM and 10/15/2019  3:22 PM *    PROCEDURE AND FINDINGS  The patient was taken to the main operating suite.  Once there, the patient was transferred over to the operating table.  The patient was given IV conscious sedation for anesthesia.  The right  upper extremity was prepped and draped in the usual sterile fashion.  The volar aspect of the hand and distal forearm was infiltrated with a 50:50 mixture of 1% lidocaine plain and 0.5% marcaine plain.  The arm was exsanguinated with an Esmarch bandage and the tourniquet was inflated to 250 mmHg.  A #15 blade knife was utilized to make a skin incision in line with the ring finger starting at St s line and extending proximally approximately 2 cm.  Dissection was carried down to the subcutaneous tissues with Teran scissors.  Bipolar cautery was utilized to achieve hemostasis.  The transverse carpal ligament was identified and carefully incised in its midsubstance with a Nelson blade knife.  Dissection was carried out distally achieving a complete distal release.  This revealed the fat surrounding the superficial palmar arch.  Care was taken to avoid violating the fat in order to avoid damaging the arch.  The most proximal aspect of the transverse carpal ligament as well as the distal volar forearm fascia was incised with Teran scissors.  This was done after first bluntly dissecting superficial then deep to the ligament.  The tips of the scissors were placed around the ligament angling away from the median nerve.  The scissors were passed bluntly proximally.  This  achieved a complete proximal release confirmed with a [place] elevator.  In this fashion, a radial-based flap of transverse carpal ligament was created completely overlying the median nerve to avoid having the median nerve caught in the healing scar tissue.  The median nerve was identified and was noted to be flattened and with mild gross evidence of scarring.  The wound was irrigated with copious amounts of normal saline with antibiotic.  The skin was closed with 4-0 nylon in horizontal mattress fashion.  A dressing consisting of Adaptic gauze, 4 x 4 fluff, sterile Webril, and Ace bandage was applied.  The tourniquet was let down for a total tourniquet time of 8 minutes.  The patient s fingers pinked up briskly.  The patient was transferred to the transport cart and taken to the recovery room in stable condition breathing spontaneously.

## 2019-10-15 NOTE — OR NURSING
Pt states is very hungry but remains sleepy. Opens eyes and dozes. Sits up in bed. Friend and pt aware to just sip on fluids before she eats and needs to be more awake. Eats about half of a piece of banana bread and develops sharp substernal chest pain. Non radiating pain. At a 7. No dyspnea or sob. No diaphoresis. On o2. Vss. ekg rhythm strip done. Per t arash crna. Status report and strip shown to crna. Will cont to reassess. Pt states pain is gone. Sips on fluids. gregory well. Iv patent. No pain in arm. roberta d/i. Friend at bedside .

## 2019-10-18 NOTE — TELEPHONE ENCOUNTER
Reviewed member attestation form with patient over the phone. Patient in agreement. Attestation form sent for scanning.

## 2019-10-21 NOTE — TELEPHONE ENCOUNTER
PA Initiation    Medication: Mavyret  Insurance Company: [employer] - Phone [phone] Fax [phone]  Pharmacy Filling the Rx: [facility] - [place], [place] - [facility] [address]  Filling Pharmacy Phone: [phone]  Filling Pharmacy Fax: [phone]  Start Date: 10/21/2019

## 2019-10-24 NOTE — TELEPHONE ENCOUNTER
I faxed over a request from the insurance asking for more information regarding the substance use for this patient, have you received that?  I got another fax this morning requesting the info again.    Thanks!

## 2019-10-25 NOTE — TELEPHONE ENCOUNTER
Prior Authorization Approval    Authorization Effective Date: 9/24/2019  Authorization Expiration Date: 12/19/2019  Medication: Mavyret  Approved Dose/Quantity: 8 weeks   Reference #: [id number]   Insurance Company: [employer] - Phone [phone] Fax [phone]  Expected CoPay: $0     Which Pharmacy is filling the prescription (Not needed for infusion/clinic administered): [facility] - [place], [place] - [facility] [address]  Pharmacy Notified: Yes  Patient Notified: Yes

## 2019-11-11 ENCOUNTER — CARE COORDINATION (OUTPATIENT)
Dept: GASTROENTEROLOGY | Facility: CLINIC | Age: 54
End: 2019-11-11

## 2019-11-11 DIAGNOSIS — B18.2 CHRONIC HEPATITIS C WITHOUT HEPATIC COMA (H): Primary | ICD-10-CM

## 2019-11-11 NOTE — PROGRESS NOTES
Unable to connect with patient for f/u on Hep C treatment delivery/start status. Patient send the clinic a My Chart message stating she received her Mavyret medication and have started treatment. Patient will be on Hep C treatment for 8 weeks. Sent patient the following Hep C POC and education via My Chart and mail.     Hepatitis C Treatment  Treatment: Mavyret x 8 weeks  Genotype: 3  Stage Fibrosis: F2  Previous Treatment Outcome: Naive    Please have labs drawn as close to the date indicated at the [facility] or [facility].    Start Date: 10/31/19    Week 8-End of Treatment  HCV RNA Quant Lab due: 12/26/2019  Please have this lab drawn on or within a week after this date 12/26/2019. You will need to repeat this lab again 3 months after completing treatment to ensure you have cleared the virus. Please see date for the final lab below. You do not need to fast for this lab.     3 Months Post Treatment  HCV RNA Quant Lab due: 3/25/2020  Please have this lab drawn on the specified date of  3/25/2020 or within a week after. This final lab will determine if you have cleared the Hepatitis C virus with  Mavyret treatment. Without this final lab, we will be unable to determine if treatment was successful. You do not need to fast for this lab.           Educational information to patient on Hep C treatment;      -Contact the [facility] Hepatology clinic and speak with clinical RN prior to starting any new prescribed or OTC medications.   -Take medications exactly as prescribed, do not change dose or stop taking without consulting your provider.   -Take Medication one time each day with food  -If you miss a dose of medication, then take it as soon as you remember on the same day. If not remembered on the same day, then skip the dose and take the next dose at the usual time. Do not take more than the recommended dose. Contact the clinic if you miss a dose.    Please contact the pharmacy 1-2 weeks prior to needing a medication  refill.        Side Effects  The most common side effects of Hep C medication treatment can include:  -tiredness  -headache  -nausea  Notify the clinic of any side effects that bother you or that do not go away.   Possible side effects have been discussed.   Patient has been instructed to clinic for rash, itching or unmanageable nausea.     How to store Hep C Treatment Medications  -Store Medication at room temperature below 86 degrees F  -Keep Medication in it's original container  -Do not use Medication if the seal is broken or missing     General information  It is not known if treatment will prevent you from infecting another person or reinfecting yourself with the hepatitis C virus during treatment. It is best that as soon as you start treatment to buy a new toothbrush, disposable razors (if you use a rotating shaver you do not need to buy a new one) and nail clippers. If you wear dentures, you should soak your dentures in 70-90% Isopropyl solution for 5 minutes one time within the first week of starting treatment. After dentures are done soaking, rinse your dentures off thoroughly with water. If you check your blood sugar at home, please dispose of the fingerstick needle after each use and DO NOT REUSE the insulin needles. These items should not be shared with anyone.          If you have any questions, please contact the main clinic at [phone] or your clinical RN at [phone]. We appreciate you choosing the [facility] clinic for your treatment. Patient agrees to Hep C treatment POC and verbalizes understanding. Patient will receive a copy of treatment plan in the mail, address verified with patient. Patient has no further questions or concerns. Hep C care team updated on patient status.       Paulette Quigley RN Care Coordinator  [facility]  Hepatology Clinic/Specialty Program

## 2019-11-11 NOTE — LETTER
November 11, 2019       TO: Shelley Lew  [address] [place] [address]       Dear Ms. Lew,    Hepatitis C Treatment    Treatment: Mavyret x 8 weeks    Please have labs drawn as close to the date indicated at the [facility].    Start Date: 10/31/19    Week 8-End of Treatment  HCV RNA Quant Lab due: 12/26/2019  Please have this lab drawn on or within a week after this date 12/26/2019. You will need to repeat this lab again 3 months after completing treatment to ensure you have cleared the virus. Please see date for the final lab below. You do not need to fast for this lab.       3 Months Post Treatment  HCV RNA Quant Lab due: 3/25/2020  Please have this lab drawn on the specified date of  3/25/2020 or within a week after. This final lab will determine if you have cleared the Hepatitis C virus with  Mavyret treatment. Without this final lab, we will be unable to determine if treatment was successful. You do not need to fast for this lab.               Educational information to patient on Hep C treatment;      -Contact the [facility] Hepat[facility] clinic and speak with clinical RN prior to starting any new prescribed or OTC medications.   -Take medications exactly as prescribed, do not change dose or stop taking without consulting your provider.   -Take Medication one time each day with food  -If you miss a dose of medication, then take it as soon as you remember on the same day. If not remembered on the same day, then skip the dose and take the next dose at the usual time. Do not take more than the recommended dose. Contact the clinic if you miss a dose.    Please contact the pharmacy 1-2 weeks prior to needing a medication refill.        Side Effects  The most common side effects of Hep C medication treatment can include:  -tiredness  -headache  -nausea  Notify the clinic of any side effects that bother you or that do not go away.   Possible side effects have been discussed.   Patient has  been instructed to clinic for rash, itching or unmanageable nausea.     How to store Hep C Treatment Medications  -Store Medication at room temperature below 86 degrees F  -Keep Medication in it's original container  -Do not use Medication if the seal is broken or missing     General information  It is not known if treatment will prevent you from infecting another person or reinfecting yourself with the hepatitis C virus during treatment. It is best that as soon as you start treatment to buy a new toothbrush, disposable razors (if you use a rotating shaver you do not need to buy a new one) and nail clippers. If you wear dentures, you should soak your dentures in 70-90% Isopropyl solution for 5 minutes one time within the first week of starting treatment. After dentures are done soaking, rinse your dentures off thoroughly with water. If you check your blood sugar at home, please dispose of the fingerstick needle after each use and DO NOT REUSE the insulin needles. These items should not be shared with anyone.          If you have any questions, please contact the main clinic at [phone] or your clinical RN at [phone]. We appreciate you choosing the [facility] for your treatment.      Paulette Quigley RN Care Coordinator  [facility]  Hepatology Clinic/Specialty Program

## 2019-12-26 DIAGNOSIS — B18.2 CHRONIC HEPATITIS C WITHOUT HEPATIC COMA (H): ICD-10-CM

## 2019-12-26 LAB
ALBUMIN SERPL-MCNC: 3.8 G/DL (ref 3.4–5)
ALP SERPL-CCNC: 135 U/L (ref 40–150)
ALT SERPL W P-5'-P-CCNC: 23 U/L (ref 0–50)
AST SERPL W P-5'-P-CCNC: 20 U/L (ref 0–45)
BILIRUB DIRECT SERPL-MCNC: 0.1 MG/DL (ref 0–0.2)
BILIRUB SERPL-MCNC: 0.4 MG/DL (ref 0.2–1.3)
PROT SERPL-MCNC: 7.8 G/DL (ref 6.8–8.8)

## 2019-12-27 ENCOUNTER — MYC MEDICAL ADVICE (OUTPATIENT)
Dept: FAMILY MEDICINE | Facility: CLINIC | Age: 54
End: 2019-12-27

## 2019-12-30 LAB
HCV RNA SERPL NAA+PROBE-ACNC: NORMAL [IU]/ML
HCV RNA SERPL NAA+PROBE-LOG IU: NORMAL LOG IU/ML

## 2019-12-30 NOTE — TELEPHONE ENCOUNTER
Please let her know liver tests are good for general liver injury.  The actual test for the hep C virus is still being processed, the GI doc ordered that and they will get the results reported to them, and then let you know.

## 2020-02-23 ENCOUNTER — OFFICE VISIT (OUTPATIENT)
Dept: URGENT CARE | Facility: URGENT CARE | Age: 55
End: 2020-02-23
Payer: COMMERCIAL

## 2020-02-23 VITALS
TEMPERATURE: 98.9 F | SYSTOLIC BLOOD PRESSURE: 140 MMHG | DIASTOLIC BLOOD PRESSURE: 82 MMHG | HEIGHT: 64 IN | RESPIRATION RATE: 18 BRPM | BODY MASS INDEX: 29.06 KG/M2 | HEART RATE: 110 BPM | OXYGEN SATURATION: 100 % | WEIGHT: 170.2 LBS

## 2020-02-23 DIAGNOSIS — R30.0 DYSURIA: ICD-10-CM

## 2020-02-23 DIAGNOSIS — N30.01 ACUTE CYSTITIS WITH HEMATURIA: Primary | ICD-10-CM

## 2020-02-23 LAB
ALBUMIN UR-MCNC: NEGATIVE MG/DL
APPEARANCE UR: CLEAR
BACTERIA #/AREA URNS HPF: ABNORMAL /HPF
BILIRUB UR QL STRIP: NEGATIVE
COLOR UR AUTO: YELLOW
GLUCOSE UR STRIP-MCNC: NEGATIVE MG/DL
HGB UR QL STRIP: ABNORMAL
KETONES UR STRIP-MCNC: NEGATIVE MG/DL
LEUKOCYTE ESTERASE UR QL STRIP: NEGATIVE
NITRATE UR QL: NEGATIVE
NON-SQ EPI CELLS #/AREA URNS LPF: ABNORMAL /LPF
PH UR STRIP: 7 PH (ref 5–7)
RBC #/AREA URNS AUTO: ABNORMAL /HPF
SOURCE: ABNORMAL
SP GR UR STRIP: 1.01 (ref 1–1.03)
UROBILINOGEN UR STRIP-ACNC: 0.2 EU/DL (ref 0.2–1)
WBC #/AREA URNS AUTO: ABNORMAL /HPF

## 2020-02-23 PROCEDURE — 81001 URINALYSIS AUTO W/SCOPE: CPT | Performed by: NURSE PRACTITIONER

## 2020-02-23 PROCEDURE — 99213 OFFICE O/P EST LOW 20 MIN: CPT | Performed by: NURSE PRACTITIONER

## 2020-02-23 PROCEDURE — 87086 URINE CULTURE/COLONY COUNT: CPT | Performed by: NURSE PRACTITIONER

## 2020-02-23 RX ORDER — CEPHALEXIN 500 MG/1
500 CAPSULE ORAL 2 TIMES DAILY
Qty: 14 CAPSULE | Refills: 0 | Status: SHIPPED | OUTPATIENT
Start: 2020-02-23 | End: 2020-05-01

## 2020-02-23 ASSESSMENT — MIFFLIN-ST. JEOR: SCORE: 1352.02

## 2020-02-23 NOTE — PROGRESS NOTES
Subjective     Shelley Lew is a 55 year old female who presents to clinic today for the following health issues:    HPI     Chief Complaint   Patient presents with     UTI     3-4 days been feeling nauseous always feels this way when has a uti.         Patient Active Problem List   Diagnosis     CARDIOVASCULAR SCREENING; LDL GOAL LESS THAN 160     CTS (carpal tunnel syndrome)     Health Care Home     Spinal stenosis of lumbar region with neurogenic claudication     Tobacco use disorder     Hyperlipidemia LDL goal <130     H/O drug abuse (H)     Chronic hepatitis C without hepatic coma (H)     Past Surgical History:   Procedure Laterality Date     CHOLECYSTECTOMY       LAMINECTOMY LUMBAR POSTERIOR MICROSCOPIC TWO LEVELS N/A 4/11/2017    Procedure: LAMINECTOMY LUMBAR POSTERIOR MICROSCOPIC TWO LEVELS;  Surgeon: Nehemias Bangura MD;  Location: [facility] OR     LAPAROSCOPY,TUBAL CAUTERY       RELEASE CARPAL TUNNEL  5/21/2013    Procedure: RELEASE CARPAL TUNNEL;  Left carpal tunnel release  ;  Surgeon: Yovani Gonzalez MD;  Location: [facility] OR     RELEASE CARPAL TUNNEL Right 10/15/2019    Procedure: Open Carpal Tunnel Release;  Surgeon: Yovani Gonzalez MD;  Location: [facility] OR       Social History     Tobacco Use     Smoking status: Light Tobacco Smoker     Types: Cigarettes     Smokeless tobacco: Never Used     Tobacco comment: one pack lasts a week   Substance Use Topics     Alcohol use: Not Currently     Comment: OCC., few sips of wine 9/3/19     Family History   Problem Relation Age of Onset     Cancer Sister         cervical     Cancer Sister         cervical         Current Outpatient Medications   Medication Sig Dispense Refill     cephALEXin (KEFLEX) 500 MG capsule Take 1 capsule (500 mg) by mouth 2 times daily for 7 days 14 capsule 0     glecaprevir-pibrentasvir (MAVYRET) 100-40 MG per tablet Take 3 tablets by mouth daily (Patient not taking: Reported on 10/9/2019) 84 tablet 1     HYDROcodone-acetaminophen  "(NORCO) 5-325 MG tablet Take 1 tablet by mouth every 4 hours as needed for moderate to severe pain (Patient not taking: Reported on 2/23/2020) 10 tablet 0     hydrocortisone valerate (WEST-ROYA) 0.2 % external ointment Apply topically 2 times daily (Patient not taking: Reported on 10/9/2019) 15 g 1     multivitamin, therapeutic with minerals (MULTI-VITAMIN) TABS Take 1 tablet by mouth daily Reported on 5/23/2017       ondansetron (ZOFRAN-ODT) 4 MG ODT tab Take 1-2 tablets (4-8 mg) by mouth every 8 hours as needed for nausea (Patient not taking: Reported on 2/23/2020) 16 tablet 0     Allergies   Allergen Reactions     Percocet [Oxycodone-Acetaminophen] Nausea and Vomiting     Nausea vomiting and head swimming     Vicodin [Hydrocodone-Acetaminophen] Nausea and Vomiting         Reviewed and updated as needed this visit by Provider  Tobacco  Allergies  Meds  Problems  Med Hx  Surg Hx  Fam Hx         Review of Systems   ROS COMP: Constitutional, HEENT, cardiovascular, pulmonary, GI, , musculoskeletal, neuro, skin, endocrine and psych systems are negative, except as otherwise noted.      Objective    BP (!) 140/82 (BP Location: Right arm, Patient Position: Sitting, Cuff Size: Adult Regular)   Pulse 110   Temp 98.9  F (37.2  C) (Tympanic)   Resp 18   Ht 1.626 m (5' 4\")   Wt 77.2 kg (170 lb 3.2 oz)   LMP 04/20/2016 (Approximate)   SpO2 100%   BMI 29.21 kg/m    Body mass index is 29.21 kg/m .  Physical Exam   GENERAL: healthy, alert and no distress, nontoxic in appearance  EYES: Eyes grossly normal to inspection, PERRL and conjunctivae and sclerae normal  HENT: normocephalic  NECK: supple with full ROM  RESP: lungs clear to auscultation - no rales, rhonchi or wheezes  CV: regular rate and rhythm, normal S1 S2, no S3 or S4, no murmur, click or rub, no peripheral edema   ABDOMEN: soft, nontender, no hepatosplenomegaly, no masses and bowel sounds normal  MS: no gross musculoskeletal defects noted, no edema  No " "rash    Diagnostic Test Results:  Labs reviewed in Epic  Results for orders placed or performed in visit on 02/23/20 (from the past 24 hour(s))   *UA reflex to Microscopic and Culture ([facility] (except [place] and [place])   Result Value Ref Range    Color Urine Yellow     Appearance Urine Clear     Glucose Urine Negative NEG^Negative mg/dL    Bilirubin Urine Negative NEG^Negative    Ketones Urine Negative NEG^Negative mg/dL    Specific Gravity Urine 1.015 1.003 - 1.035    Blood Urine Trace (A) NEG^Negative    pH Urine 7.0 5.0 - 7.0 pH    Protein Albumin Urine Negative NEG^Negative mg/dL    Urobilinogen Urine 0.2 0.2 - 1.0 EU/dL    Nitrite Urine Negative NEG^Negative    Leukocyte Esterase Urine Negative NEG^Negative    Source Midstream Urine    Urine Microscopic   Result Value Ref Range    WBC Urine 0 - 5 OTO5^0 - 5 /HPF    RBC Urine 2-5 (A) OTO2^O - 2 /HPF    Squamous Epithelial /LPF Urine Moderate (A) FEW^Few /LPF    Bacteria Urine Few (A) NEG^Negative /HPF           Assessment & Plan   Problem List Items Addressed This Visit     None      Visit Diagnoses     Acute cystitis with hematuria    -  Primary    Relevant Medications    cephALEXin (KEFLEX) 500 MG capsule    Dysuria        Relevant Orders    *UA reflex to Microscopic and Culture ([facility] (except [place] and [place]) (Completed)    Urine Microscopic (Completed)    Urine Culture Aerobic Bacterial (Completed)             Tobacco Cessation:   reports that she has been smoking cigarettes. She has never used smokeless tobacco.  Tobacco Cessation Action Plan: Information offered: Patient not interested at this time      BMI:   Estimated body mass index is 29.21 kg/m  as calculated from the following:    Height as of this encounter: 1.626 m (5' 4\").    Weight as of this encounter: 77.2 kg (170 lb 3.2 oz).   Weight management plan: Patient was referred to their PCP to discuss a diet and exercise plan.        Patient " "Instructions   Antibiotics as directed   Urine culture is pending, will contact you if there is a change in treatment therapy.   Drink plenty of fluids. Prevention and treatment of UTI's discussed.   Signs and symptoms of pyelonephritis mentioned.   RTC if any worsening symptoms or if not improving as expected.     Patient Education     Bladder Infection, Female (Adult)    Urine is normally doesn't have any bacteria in it. But bacteria can get into the urinary tract from the skin around the rectum. Or they can travel in the blood from elsewhere in the body. Once they are in your urinary tract, they can cause infection in the urethra (urethritis), the bladder (cystitis), or the kidneys (pyelonephritis).  The most common place for an infection is in the bladder. This is called a bladder infection. This is one of the most common infections in women. Most bladder infections are easily treated. They are not serious unless the infection spreads to the kidney.  The phrases \"bladder infection,\" \"UTI,\" and \"cystitis\" are often used to describe the same thing. But they are not always the same. Cystitis is an inflammation of the bladder. The most common cause of cystitis is an infection.  Symptoms  The infection causes inflammation in the urethra and bladder. This causes many of the symptoms. The most common symptoms of a bladder infection are:    Pain or burning when urinating    Having to urinate more often than usual    Urgent need to urinate    Only a small amount of urine comes out    Blood in urine    Abdominal discomfort. This is usually in the lower abdomen above the pubic bone.    Cloudy urine    Strong- or bad-smelling urine    Unable to urinate (urinary retention)    Unable to hold urine in (urinary incontinence)    Fever    Loss of appetite    Confusion (in older adults)  Causes  Bladder infections are not contagious. You can't get one from someone else, from a toilet seat, or from sharing a bath.  The most common " cause of bladder infections is bacteria from the bowels. The bacteria get onto the skin around the opening of the urethra. From there, they can get into the urine and travel up to the bladder, causing inflammation and infection. This usually happens because of:    Wiping improperly after urinating. Always wipe from front to back.    Bowel incontinence    Pregnancy    Procedures such as having a catheter inserted    Older age    Not emptying your bladder. This can allow bacteria a chance to grow in your urine.    Dehydration    Constipation    Sex    Use of a diaphragm for birth control   Treatment  Bladder infections are diagnosed by a urine test. They are treated with antibiotics and usually clear up quickly without complications. Treatment helps prevent a more serious kidney infection.  Medicines  Medicines can help in the treatment of a bladder infection:    Take antibiotics until they are used up, even if you feel better. It is important to finish them to make sure the infection has cleared.    You can use acetaminophen or ibuprofen for pain, fever, or discomfort, unless another medicine was prescribed. If you have chronic liver or kidney disease, talk with your healthcare provider before using these medicines. Also talk with your provider if you've ever had a stomach ulcer or gastrointestinal bleeding, or are taking blood-thinner medicines.    If you are given phenazopydridine to reduce burning with urination, it will cause your urine to become a bright orange color. This can stain clothing.  Care and prevention  These self-care steps can help prevent future infections:    Drink plenty of fluids to prevent dehydration and flush out your bladder. Do this unless you must restrict fluids for other health reasons, or your doctor told you not to.    Proper cleaning after going to the bathroom is important. Wipe from front to back after using the toilet to prevent the spread of bacteria.    Urinate more often. Don't  try to hold urine in for a long time.    Wear loose-fitting clothes and cotton underwear. Avoid tight-fitting pants.    Improve your diet and prevent constipation. Eat more fresh fruit and vegetables, and fiber, and less junk and fatty foods.    Avoid sex until your symptoms are gone.    Avoid caffeine, alcohol, and spicy foods. These can irritate your bladder.    Urinate right after intercourse to flush out your bladder.    If you use birth control pills and have frequent bladder infections, discuss it with your doctor.  Follow-up care  Call your healthcare provider if all symptoms are not gone after 3 days of treatment. This is especially important if you have repeat infections.  If a culture was done, you will be told if your treatment needs to be changed. If directed, you can call to find out the results.  If X-rays were done, you will be told if the results will affect your treatment.  Call 911  Call 911 if any of the following occur:    Trouble breathing    Hard to wake up or confusion    Fainting or loss of consciousness    Rapid heart rate  When to seek medical advice  Call your healthcare provider right away if any of these occur:    Fever of 100.4 F (38.0 C) or higher, or as directed by your healthcare provider    Symptoms are not better by the third day of treatment    Back or belly (abdominal) pain that gets worse    Repeated vomiting, or unable to keep medicine down    Weakness or dizziness    Vaginal discharge    Pain, redness, or swelling in the outer vaginal area (labia)  Date Last Reviewed: 10/1/2016    6042-4044 [employer]. [address] [place] [address]. All rights reserved. This information is not intended as a substitute for professional medical care. Always follow your healthcare professional's instructions.             Return in about 1 week (around 3/1/2020) for Follow up with your primary care provider.    ROBERTA Gomes [facility]  [facility]

## 2020-02-23 NOTE — PATIENT INSTRUCTIONS
"Antibiotics as directed   Urine culture is pending, will contact you if there is a change in treatment therapy.   Drink plenty of fluids. Prevention and treatment of UTI's discussed.   Signs and symptoms of pyelonephritis mentioned.   RTC if any worsening symptoms or if not improving as expected.     Patient Education     Bladder Infection, Female (Adult)    Urine is normally doesn't have any bacteria in it. But bacteria can get into the urinary tract from the skin around the rectum. Or they can travel in the blood from elsewhere in the body. Once they are in your urinary tract, they can cause infection in the urethra (urethritis), the bladder (cystitis), or the kidneys (pyelonephritis).  The most common place for an infection is in the bladder. This is called a bladder infection. This is one of the most common infections in women. Most bladder infections are easily treated. They are not serious unless the infection spreads to the kidney.  The phrases \"bladder infection,\" \"UTI,\" and \"cystitis\" are often used to describe the same thing. But they are not always the same. Cystitis is an inflammation of the bladder. The most common cause of cystitis is an infection.  Symptoms  The infection causes inflammation in the urethra and bladder. This causes many of the symptoms. The most common symptoms of a bladder infection are:    Pain or burning when urinating    Having to urinate more often than usual    Urgent need to urinate    Only a small amount of urine comes out    Blood in urine    Abdominal discomfort. This is usually in the lower abdomen above the pubic bone.    Cloudy urine    Strong- or bad-smelling urine    Unable to urinate (urinary retention)    Unable to hold urine in (urinary incontinence)    Fever    Loss of appetite    Confusion (in older adults)  Causes  Bladder infections are not contagious. You can't get one from someone else, from a toilet seat, or from sharing a bath.  The most common cause of " bladder infections is bacteria from the bowels. The bacteria get onto the skin around the opening of the urethra. From there, they can get into the urine and travel up to the bladder, causing inflammation and infection. This usually happens because of:    Wiping improperly after urinating. Always wipe from front to back.    Bowel incontinence    Pregnancy    Procedures such as having a catheter inserted    Older age    Not emptying your bladder. This can allow bacteria a chance to grow in your urine.    Dehydration    Constipation    Sex    Use of a diaphragm for birth control   Treatment  Bladder infections are diagnosed by a urine test. They are treated with antibiotics and usually clear up quickly without complications. Treatment helps prevent a more serious kidney infection.  Medicines  Medicines can help in the treatment of a bladder infection:    Take antibiotics until they are used up, even if you feel better. It is important to finish them to make sure the infection has cleared.    You can use acetaminophen or ibuprofen for pain, fever, or discomfort, unless another medicine was prescribed. If you have chronic liver or kidney disease, talk with your healthcare provider before using these medicines. Also talk with your provider if you've ever had a stomach ulcer or gastrointestinal bleeding, or are taking blood-thinner medicines.    If you are given phenazopydridine to reduce burning with urination, it will cause your urine to become a bright orange color. This can stain clothing.  Care and prevention  These self-care steps can help prevent future infections:    Drink plenty of fluids to prevent dehydration and flush out your bladder. Do this unless you must restrict fluids for other health reasons, or your doctor told you not to.    Proper cleaning after going to the bathroom is important. Wipe from front to back after using the toilet to prevent the spread of bacteria.    Urinate more often. Don't try to  hold urine in for a long time.    Wear loose-fitting clothes and cotton underwear. Avoid tight-fitting pants.    Improve your diet and prevent constipation. Eat more fresh fruit and vegetables, and fiber, and less junk and fatty foods.    Avoid sex until your symptoms are gone.    Avoid caffeine, alcohol, and spicy foods. These can irritate your bladder.    Urinate right after intercourse to flush out your bladder.    If you use birth control pills and have frequent bladder infections, discuss it with your doctor.  Follow-up care  Call your healthcare provider if all symptoms are not gone after 3 days of treatment. This is especially important if you have repeat infections.  If a culture was done, you will be told if your treatment needs to be changed. If directed, you can call to find out the results.  If X-rays were done, you will be told if the results will affect your treatment.  Call 911  Call 911 if any of the following occur:    Trouble breathing    Hard to wake up or confusion    Fainting or loss of consciousness    Rapid heart rate  When to seek medical advice  Call your healthcare provider right away if any of these occur:    Fever of 100.4 F (38.0 C) or higher, or as directed by your healthcare provider    Symptoms are not better by the third day of treatment    Back or belly (abdominal) pain that gets worse    Repeated vomiting, or unable to keep medicine down    Weakness or dizziness    Vaginal discharge    Pain, redness, or swelling in the outer vaginal area (labia)  Date Last Reviewed: 10/1/2016    4377-5688 [employer]. [address] [place] [address], [place] [address]. All rights reserved. This information is not intended as a substitute for professional medical care. Always follow your healthcare professional's instructions.

## 2020-02-24 ENCOUNTER — HEALTH MAINTENANCE LETTER (OUTPATIENT)
Age: 55
End: 2020-02-24

## 2020-02-24 LAB
BACTERIA SPEC CULT: NO GROWTH
Lab: NORMAL
SPECIMEN SOURCE: NORMAL

## 2020-02-25 ENCOUNTER — TELEPHONE (OUTPATIENT)
Dept: FAMILY MEDICINE | Facility: CLINIC | Age: 55
End: 2020-02-25

## 2020-02-25 NOTE — RESULT ENCOUNTER NOTE
"Final urine culture report shows \"NO GROWTH\" and is NEGATIVE.  Emergency Dept discharge antibiotic: Cephalexin (Keflex) 500 mg capsule, 1 capsule (500 mg) by mouth 2 times daily for 7 days.  Is ED discharge Rx antibiotic for UTI only (Yes/No): Yes  Recommendations per [facility] ED Lab result protocol - Urine culture protocol."

## 2020-02-25 NOTE — TELEPHONE ENCOUNTER
"[facility] Lab result notification:    Reason for call  Notify of lab results, assess symptoms,  review Urgent Care providers recommendations/discharge instructions (if necessary) and advise per ED lab result f/u protocol.  Per Protocol she'll be advised to discontinue antibiotic unless she took antibiotic prior to urine culture collection.    Lab Result  Final urine culture report on 2/24/20 shows \"NO GROWTH\" and is NEGATIVE.  Emergency Dept discharge antibiotic: Cephalexin (Keflex) 500 mg capsule, 1 capsule (500 mg) by mouth 2 times daily for 7 days.  Is ED discharge Rx antibiotic for UTI only (Yes/No): Yes  Recommendations per [facility] Lab result protocol - Urine culture protocol.      Information table from Urgent Care Provider visit on 2/23/20  Symptoms reported at Urgent Care visit (Chief complaint, HPI) Chief Complaint   Patient presents with     UTI       3-4 days been feeling nauseous always feels this way when has a uti.      Urgent Care providers Impression and Plan (applicable information) Patient Instructions   Antibiotics as directed   Urine culture is pending, will contact you if there is a change in treatment therapy.   Drink plenty of fluids. Prevention and treatment of UTI's discussed.   Signs and symptoms of pyelonephritis mentioned.   RTC if any worsening symptoms or if not improving as expected.      Miscellaneous information NA     RN Assessment (Patient s current Symptoms), include time called.  [Insert Left message here if message left]  At 11:48A Left voicemail message requesting a call back to [facility] ED Lab Result RN at [phone].  RN is available every day between 10 a.m. and 6:30 p.m..      [RN Name]  Kirk Powers RN  [facility]  Emergency Dept Lab Result RN  Ph# [phone]      Copy of Lab result   Urine Culture Aerobic Bacterial [[id number]] (Order [id number])   Order Requisition     Urine Culture Aerobic " Bacterial (Order #[id number]) on 2/23/20   Exam Information     Exam Date Exam Time Accession # Results    2/23/20  4:23 PM [id number]    Specimen Information: Midstream Urine        Component Collected Lab   Specimen Description 02/23/2020  4:23    Midstream Urine    Special Requests 02/23/2020  4:23    Specimen received in preservative    Culture Micro 02/23/2020  4:23    No growth

## 2020-02-26 NOTE — TELEPHONE ENCOUNTER
"[facility] result notification:    Patient/parent Name  Ruthann    RN Assessment (Patient s current Symptoms), include time called.  [Insert Left message here if message left]  5:32PM: Returned call to patient. She states \"I'm doing fine.\"   Lab result (if applicable):  Final urine culture report on 2/24/20 shows \"NO GROWTH\" and is NEGATIVE.  Emergency Dept discharge antibiotic: Cephalexin (Keflex) 500 mg capsule, 1 capsule (500 mg) by mouth 2 times daily for 7 days.  Is ED discharge Rx antibiotic for UTI only (Yes/No): Yes  Recommendations per [place] [facility] Lab result protocol - Urine culture protocol.  RN Recommendations/Instructions per [place] ED lab result protocol  Patient notified of lab result and treatment recommendation.   Verified with patient that she has not been on antibiotic's prior to the UA being collected in the urgent care and that the antibiotic was prescribed for a UTI only.   Advised to follow up with PCP as directed by the ED provider. The patient states that she does have an appointment scheduled. She is comfortable with the information given and has no further questions.       Please Contact your PCP clinic or return to the Emergency department if your:    Symptoms worsen or other concerning symptom's.    PCP follow-up Questions asked: YES       [RN Name]  Krystin Do RN  [facility] RN  Lung Nodule and ED Lab Result RN  Epic pool (ED late result f/u RN): [id number]  [facility] INCIDENTAL RADIOLOGY F/U NURSES: P [id number]  # [phone]      "

## 2020-03-16 ENCOUNTER — OFFICE VISIT (OUTPATIENT)
Dept: FAMILY MEDICINE | Facility: CLINIC | Age: 55
End: 2020-03-16
Payer: COMMERCIAL

## 2020-03-16 VITALS
HEIGHT: 64 IN | BODY MASS INDEX: 29.88 KG/M2 | WEIGHT: 175 LBS | HEART RATE: 90 BPM | TEMPERATURE: 97.8 F | OXYGEN SATURATION: 99 % | DIASTOLIC BLOOD PRESSURE: 74 MMHG | SYSTOLIC BLOOD PRESSURE: 108 MMHG

## 2020-03-16 DIAGNOSIS — F43.0 ACUTE REACTION TO STRESS: Primary | ICD-10-CM

## 2020-03-16 DIAGNOSIS — G56.01 CARPAL TUNNEL SYNDROME OF RIGHT WRIST: ICD-10-CM

## 2020-03-16 PROCEDURE — 99214 OFFICE O/P EST MOD 30 MIN: CPT | Performed by: FAMILY MEDICINE

## 2020-03-16 RX ORDER — LORAZEPAM 1 MG/1
1 TABLET ORAL EVERY 8 HOURS PRN
Qty: 30 TABLET | Refills: 1 | Status: SHIPPED | OUTPATIENT
Start: 2020-03-16 | End: 2020-08-17

## 2020-03-16 RX ORDER — ONDANSETRON 4 MG/1
4-8 TABLET, ORALLY DISINTEGRATING ORAL EVERY 8 HOURS PRN
Qty: 16 TABLET | Refills: 0 | Status: SHIPPED | OUTPATIENT
Start: 2020-03-16 | End: 2020-05-01

## 2020-03-16 ASSESSMENT — PATIENT HEALTH QUESTIONNAIRE - PHQ9
SUM OF ALL RESPONSES TO PHQ QUESTIONS 1-9: 23
SUM OF ALL RESPONSES TO PHQ QUESTIONS 1-9: 23
10. IF YOU CHECKED OFF ANY PROBLEMS, HOW DIFFICULT HAVE THESE PROBLEMS MADE IT FOR YOU TO DO YOUR WORK, TAKE CARE OF THINGS AT HOME, OR GET ALONG WITH OTHER PEOPLE: SOMEWHAT DIFFICULT

## 2020-03-16 ASSESSMENT — ENCOUNTER SYMPTOMS
HEMATOCHEZIA: 0
NERVOUS/ANXIOUS: 1
COUGH: 0
HEADACHES: 1
DIZZINESS: 0
BREAST MASS: 0
CONSTIPATION: 1
EYE PAIN: 1
HEMATURIA: 1
CHILLS: 0
DIARRHEA: 0

## 2020-03-16 ASSESSMENT — MIFFLIN-ST. JEOR: SCORE: 1373.79

## 2020-03-16 NOTE — NURSING NOTE
"Chief Complaint   Patient presents with     Physical       Initial /74   Pulse 90   Temp 97.8  F (36.6  C) (Tympanic)   Ht 1.626 m (5' 4\")   Wt 79.4 kg (175 lb)   LMP 04/20/2016 (Approximate)   SpO2 99%   BMI 30.04 kg/m   Estimated body mass index is 30.04 kg/m  as calculated from the following:    Height as of this encounter: 1.626 m (5' 4\").    Weight as of this encounter: 79.4 kg (175 lb).    Patient presents to the clinic using No DME    Health Maintenance that is potentially due pending provider review:  NONE    n/a    Is there anyone who you would like to be able to receive your results? No  If yes have patient fill out MARLEN    Anu Rea CMA(AAMA)    "

## 2020-03-16 NOTE — PROGRESS NOTES
"s :Shelley Lew is a 55 year old female who scheduled as a physical, but it needs to be a problem based visit.     Acute grief.  Mom  unexpectedly from complications after colonoscopy.  Patient and sisters are dealing with estate.  Shelley is very stressed.  Crying, tense, not sleeping, pain all over, sometimes hyperventilating, rib pain.      Headache.  Not thinking straight.  Feels worn out.      Not suicidal.  \"no plans or anything, just feel numb sometimes.\"    Not using meth, has hx of that.  Not drinking.  No other drug use.     Interested in something for stress/anxiety/grief.    Problem list, med list, additional histories reviewed and updated, as indicated.      O:/74   Pulse 90   Temp 97.8  F (36.6  C) (Tympanic)   Ht 1.626 m (5' 4\")   Wt 79.4 kg (175 lb)   LMP 2016 (Approximate)   SpO2 99%   BMI 30.04 kg/m    GEN: Alert and oriented, in mild distress, teary, a bit shaky/anxious.  Gives clear hx.  Poor eye contact.      A: acute stress reaction    P: she was interested in anxiety med.  Will do ativan 30 tabs, tid prn.  Suggested she start with night use for sleep, try to limit day use.  Did give a refill.      If needing ongoing fills, would transition to serotonin specific reuptake inhibitor.  She understands.    F/u in a few weeks.  Earlier prn      tt 25 min, more than 1/2 that time counseling on stress, ativan use, follow up, and options for ongoing anxieety/depression .     "

## 2020-03-17 ASSESSMENT — PATIENT HEALTH QUESTIONNAIRE - PHQ9: SUM OF ALL RESPONSES TO PHQ QUESTIONS 1-9: 23

## 2020-03-25 DIAGNOSIS — B18.2 CHRONIC HEPATITIS C WITHOUT HEPATIC COMA (H): ICD-10-CM

## 2020-03-27 LAB
HCV RNA SERPL NAA+PROBE-ACNC: NORMAL [IU]/ML
HCV RNA SERPL NAA+PROBE-LOG IU: NORMAL LOG IU/ML

## 2020-05-01 ENCOUNTER — HOSPITAL ENCOUNTER (EMERGENCY)
Facility: CLINIC | Age: 55
Discharge: HOME | End: 2020-05-01
Attending: EMERGENCY MEDICINE | Admitting: EMERGENCY MEDICINE
Payer: COMMERCIAL

## 2020-05-01 ENCOUNTER — E-VISIT (OUTPATIENT)
Dept: FAMILY MEDICINE | Facility: CLINIC | Age: 55
End: 2020-05-01
Payer: COMMERCIAL

## 2020-05-01 VITALS
BODY MASS INDEX: 30.73 KG/M2 | HEART RATE: 82 BPM | SYSTOLIC BLOOD PRESSURE: 125 MMHG | HEIGHT: 64 IN | DIASTOLIC BLOOD PRESSURE: 101 MMHG | RESPIRATION RATE: 17 BRPM | TEMPERATURE: 96.7 F | WEIGHT: 180 LBS | OXYGEN SATURATION: 97 %

## 2020-05-01 DIAGNOSIS — H81.10 BENIGN PAROXYSMAL POSITIONAL VERTIGO, UNSPECIFIED LATERALITY: ICD-10-CM

## 2020-05-01 DIAGNOSIS — R09.81 NASAL CONGESTION: Primary | ICD-10-CM

## 2020-05-01 DIAGNOSIS — M54.50 ACUTE BILATERAL LOW BACK PAIN WITHOUT SCIATICA: ICD-10-CM

## 2020-05-01 LAB
ALBUMIN UR-MCNC: NEGATIVE MG/DL
APPEARANCE UR: ABNORMAL
BACTERIA #/AREA URNS HPF: ABNORMAL /HPF
BILIRUB UR QL STRIP: NEGATIVE
COLOR UR AUTO: YELLOW
GLUCOSE UR STRIP-MCNC: NEGATIVE MG/DL
HGB UR QL STRIP: NEGATIVE
KETONES UR STRIP-MCNC: NEGATIVE MG/DL
LEUKOCYTE ESTERASE UR QL STRIP: ABNORMAL
MUCOUS THREADS #/AREA URNS LPF: PRESENT /LPF
NITRATE UR QL: NEGATIVE
PH UR STRIP: 9 PH (ref 5–7)
RBC #/AREA URNS AUTO: 2 /HPF (ref 0–2)
SOURCE: ABNORMAL
SP GR UR STRIP: 1.01 (ref 1–1.03)
SQUAMOUS #/AREA URNS AUTO: 19 /HPF (ref 0–1)
UROBILINOGEN UR STRIP-MCNC: 0 MG/DL (ref 0–2)
WBC #/AREA URNS AUTO: 3 /HPF (ref 0–5)

## 2020-05-01 PROCEDURE — 99283 EMERGENCY DEPT VISIT LOW MDM: CPT | Performed by: EMERGENCY MEDICINE

## 2020-05-01 PROCEDURE — 25000128 H RX IP 250 OP 636: Performed by: EMERGENCY MEDICINE

## 2020-05-01 PROCEDURE — 81001 URINALYSIS AUTO W/SCOPE: CPT | Performed by: EMERGENCY MEDICINE

## 2020-05-01 PROCEDURE — 87086 URINE CULTURE/COLONY COUNT: CPT | Performed by: EMERGENCY MEDICINE

## 2020-05-01 PROCEDURE — 99284 EMERGENCY DEPT VISIT MOD MDM: CPT | Mod: Z6 | Performed by: EMERGENCY MEDICINE

## 2020-05-01 PROCEDURE — 99441 ZZC PHYSICIAN TELEPHONE EVALUATION 5-10 MIN: CPT | Performed by: NURSE PRACTITIONER

## 2020-05-01 RX ORDER — FLUTICASONE PROPIONATE 50 MCG
2 SPRAY, SUSPENSION (ML) NASAL DAILY
Qty: 9.9 ML | Refills: 0 | Status: SHIPPED | OUTPATIENT
Start: 2020-05-01 | End: 2020-06-15

## 2020-05-01 RX ORDER — CETIRIZINE HYDROCHLORIDE 10 MG/1
10 TABLET ORAL DAILY
Qty: 30 TABLET | Refills: 0 | COMMUNITY
Start: 2020-05-01 | End: 2020-11-11

## 2020-05-01 RX ORDER — ONDANSETRON 4 MG/1
4 TABLET, ORALLY DISINTEGRATING ORAL EVERY 6 HOURS PRN
Qty: 10 TABLET | Refills: 0 | Status: SHIPPED | OUTPATIENT
Start: 2020-05-01 | End: 2020-07-22

## 2020-05-01 RX ORDER — ONDANSETRON 4 MG/1
4 TABLET, ORALLY DISINTEGRATING ORAL
Status: COMPLETED | OUTPATIENT
Start: 2020-05-01 | End: 2020-05-01

## 2020-05-01 RX ORDER — MECLIZINE HYDROCHLORIDE 25 MG/1
25-50 TABLET ORAL 3 TIMES DAILY PRN
Qty: 30 TABLET | Refills: 1 | Status: SHIPPED | OUTPATIENT
Start: 2020-05-01 | End: 2021-01-25

## 2020-05-01 RX ADMIN — ONDANSETRON 4 MG: 4 TABLET, ORALLY DISINTEGRATING ORAL at 11:27

## 2020-05-01 ASSESSMENT — MIFFLIN-ST. JEOR: SCORE: 1396.47

## 2020-05-01 NOTE — ED PROVIDER NOTES
History     Chief Complaint   Patient presents with     Flank Pain     HPI  Shelley Lew is a 55 year old female who tells me her chief complaint is dizziness.  She states this is been intermittent for the last 3 to 4 days.  This is worse with positional change in her head.  She denies any other neurological changes.  She states she has a history of vertigo similar to this that was improved with meclizine.  She has had no headache or hearing loss.    She also describes some low back pain that is been present over the last 3 days.  This is worse with standing or bending.  She endorses doing increased yard work recently.  She states she has had no trauma recently.  No loss of bowel or bladder.  She is had no dysuria.  No fever.    Allergies:  Allergies   Allergen Reactions     Percocet [Oxycodone-Acetaminophen] Nausea and Vomiting     Nausea vomiting and head swimming     Vicodin [Hydrocodone-Acetaminophen] Nausea and Vomiting       Problem List:    Patient Active Problem List    Diagnosis Date Noted     Chronic hepatitis C without hepatic coma (H) 10/09/2019     Priority: Medium     Hyperlipidemia LDL goal <130 07/06/2017     Priority: Medium     H/O drug abuse (H) 07/06/2017     Priority: Medium     Intermittent meth use        Spinal stenosis of lumbar region with neurogenic claudication 02/16/2017     Priority: Medium     Spine surg referral.  Many MRI findings.  Severely limited by this.    Intermittent meth use has been an issue for her, would be careful with opioids.         Tobacco use disorder 02/16/2017     Priority: Medium     Health Care Home 03/31/2016     Priority: Medium       Status:  Unable to reach  Care Coordinator:  Nabila Coffman    See Letters for [facility] Care Plan  Date:  March 31, 2016           CTS (carpal tunnel syndrome) 05/15/2013     Priority: Medium     CARDIOVASCULAR SCREENING; LDL GOAL LESS THAN 160 10/31/2010     Priority: Medium        Past Medical History:    Past Medical History:  "  Diagnosis Date      (normal spontaneous vaginal delivery)      PONV (postoperative nausea and vomiting)      Pyelonephritis, acute        Past Surgical History:    Past Surgical History:   Procedure Laterality Date     CHOLECYSTECTOMY       LAMINECTOMY LUMBAR POSTERIOR MICROSCOPIC TWO LEVELS N/A 2017    Procedure: LAMINECTOMY LUMBAR POSTERIOR MICROSCOPIC TWO LEVELS;  Surgeon: Nehemias Bangura MD;  Location: [facility] OR     LAPAROSCOPY,TUBAL CAUTERY       RELEASE CARPAL TUNNEL  2013    Procedure: RELEASE CARPAL TUNNEL;  Left carpal tunnel release  ;  Surgeon: Yovani Gonzalez MD;  Location: [facility] OR     RELEASE CARPAL TUNNEL Right 10/15/2019    Procedure: Open Carpal Tunnel Release;  Surgeon: Yovani Gonzalez MD;  Location: [facility] OR       Family History:    Family History   Problem Relation Age of Onset     Cancer Sister         cervical     Cancer Sister         cervical       Social History:  Marital Status:  Single [1]  Social History     Tobacco Use     Smoking status: Light Tobacco Smoker     Types: Cigarettes     Smokeless tobacco: Never Used     Tobacco comment: one pack lasts a week   Substance Use Topics     Alcohol use: Not Currently     Comment: OCC., few sips of wine 9/3/19     Drug use: Yes     Types: Methamphetamines     Comment: smokes, not IV        Medications:    meclizine (ANTIVERT) 25 MG tablet  ondansetron (ZOFRAN ODT) 4 MG ODT tab  HYDROcodone-acetaminophen (NORCO) 5-325 MG tablet  hydrocortisone valerate (WEST-ROYA) 0.2 % external ointment  LORazepam (ATIVAN) 1 MG tablet  multivitamin, therapeutic with minerals (MULTI-VITAMIN) TABS          Review of Systems  All other systems are reviewed and are negative    Physical Exam   BP: (!) 125/101  Pulse: 82  Temp: 96.7  F (35.9  C)  Resp: 17  Height: 162.6 cm (5' 4\")  Weight: 81.6 kg (180 lb)  SpO2: 97 %      Physical Exam  Vitals signs and nursing note reviewed.   Constitutional:       General: She is not in acute distress.     " Appearance: She is well-developed. She is not diaphoretic.   HENT:      Head: Normocephalic and atraumatic.   Eyes:      General: No scleral icterus.     Pupils: Pupils are equal, round, and reactive to light.   Neck:      Musculoskeletal: Normal range of motion and neck supple.   Cardiovascular:      Rate and Rhythm: Normal rate and regular rhythm.      Heart sounds: Normal heart sounds. No murmur.   Pulmonary:      Effort: No respiratory distress.      Breath sounds: No stridor. No wheezing or rales.   Abdominal:      Palpations: Abdomen is soft.      Tenderness: There is no abdominal tenderness.   Musculoskeletal:         General: No tenderness.   Skin:     General: Skin is warm and dry.      Coloration: Skin is not pale.      Findings: No erythema or rash.   Neurological:      General: No focal deficit present.      Mental Status: She is alert.      Cranial Nerves: No cranial nerve deficit.      Motor: No weakness.   Psychiatric:         Mood and Affect: Mood normal.         ED Course        Procedures               Critical Care time:  none               Results for orders placed or performed during the hospital encounter of 05/01/20 (from the past 24 hour(s))   UA with Microscopic   Result Value Ref Range    Color Urine Yellow     Appearance Urine Slightly Cloudy     Glucose Urine Negative NEG^Negative mg/dL    Bilirubin Urine Negative NEG^Negative    Ketones Urine Negative NEG^Negative mg/dL    Specific Gravity Urine 1.015 1.003 - 1.035    Blood Urine Negative NEG^Negative    pH Urine 9.0 (H) 5.0 - 7.0 pH    Protein Albumin Urine Negative NEG^Negative mg/dL    Urobilinogen mg/dL 0.0 0.0 - 2.0 mg/dL    Nitrite Urine Negative NEG^Negative    Leukocyte Esterase Urine Trace (A) NEG^Negative    Source Midstream Urine     WBC Urine 3 0 - 5 /HPF    RBC Urine 2 0 - 2 /HPF    Bacteria Urine Few (A) NEG^Negative /HPF    Squamous Epithelial /HPF Urine 19 (H) 0 - 1 /HPF    Mucous Urine Present (A) NEG^Negative /LPF        Medications   ondansetron (ZOFRAN-ODT) ODT tab 4 mg (4 mg Oral Given 5/1/20 1127)       Assessments & Plan (with Medical Decision Making)  55-year-old female with  symptoms consistent with recurrent benign positional paroxysmal vertigo.  No other neurological deficit.  Dizziness is reproducible with movements.  She was excited to have meclizine as this has worked for this in the past.  This is prescribed as well as Zofran.      Also with some low back pain.  No dysuria.  Urine appears contaminated but does not appear infected.  It appears she has some mechanical back pain.  She may use some Tylenol or ibuprofen as needed for this.  Follow-up if not improving over the next week.  Return anytime sooner if condition worsens.       I have reviewed the nursing notes.    I have reviewed the findings, diagnosis, plan and need for follow up with the patient.       New Prescriptions    MECLIZINE (ANTIVERT) 25 MG TABLET    Take 1-2 tablets (25-50 mg) by mouth 3 times daily as needed for dizziness    ONDANSETRON (ZOFRAN ODT) 4 MG ODT TAB    Take 1 tablet (4 mg) by mouth every 6 hours as needed for nausea       Final diagnoses:   Benign paroxysmal positional vertigo, unspecified laterality   Acute bilateral low back pain without sciatica       5/1/2020   [facility] EMERGENCY DEPARTMENT     Gabriel Anderson MD  05/01/20 1229

## 2020-05-01 NOTE — ED AVS SNAPSHOT
[facility] Emergency Department  [address] [place] [address]  Phone:  [phone]  Fax:  [phone]                                    Shelley Lew   MRN: [id number]    Department:  [facility] Emergency Department   Date of Visit:  5/1/2020           After Visit Summary Signature Page    I have received my discharge instructions, and my questions have been answered. I have discussed any challenges I see with this plan with the nurse or doctor.    ..........................................................................................................................................  Patient/Patient Representative Signature      ..........................................................................................................................................  Patient Representative Print Name and Relationship to Patient    ..................................................               ................................................  Date                                   Time    ..........................................................................................................................................  Reviewed by Signature/Title    ...................................................              ..............................................  Date                                               Time          22EPIC Rev 08/18

## 2020-05-01 NOTE — TELEPHONE ENCOUNTER
Provider E-Visit time total (minutes): 10  Seen in ER 1 hour ago  Physical exam was negative  Dx: BPPV  Plan: Meclizine and Zofran    C/o nasal congestion X 1 day. Requesting antibiotic.

## 2020-05-01 NOTE — ED TRIAGE NOTES
Bilateral flank pain for about 1 week, very nauseated, and urgency.  Feels like she has a kidney infections.

## 2020-05-01 NOTE — PATIENT INSTRUCTIONS
Thank you for choosing us for your care. You're symptoms sound like a viral cold, or seasonal allergies. Recommendation, would be to try an antihistamine like: Claritin, Zyrtec, Allegra, or Xyzol  daily for 2 weeks.  These medications can be found over-the-counter at your local pharmacy. You could also use Flonase nasal spray daily for 2 weeks- a prescription is sent, if it isn't covered by your insurance, it can be found over-the-counter.  See your AVS for more information on nasal hygiene.   If you're symptoms don't improve after a week, schedule an in-clinic visit. You can schedule an appointment right here in [place], or call [phone]    Thank you for choosing us for your care. Based on your symptoms and length of illness, I do not think that you need a prescription at this time.  Please follow the care advise I've provided and use the over the counter medications to help relieve your symptoms.   View your full visit summary for details by clicking on the link below.     If you're not feeling better within 2-3 days, please respond to this message and we can consider if a prescription is needed.  You can schedule an appointment right here in [place], or call [phone]  If the visit is for the same symptoms as your e-visit, we'll refund the cost of your e-visit if seen within seven days.        Nasal congestion  The sinuses are air-filled spaces within the bones of the face. They connect to the inside of the nose. Sinusitis is an inflammation of the tissue lining the sinus cavity. Sinus inflammation can occur during a cold or hay-fever (allergies to pollens and other particles in the air) and cause symptoms of sinus congestion and fullness and perhaps a low-grade fever. These symptoms can last up to 7-10 days. This does not require antibiotic treatment.  Home Care:  1. Drink plenty of water, hot tea, and other liquids to stay well hydrated. This thins the mucus and promotes sinus drainage.  2. Apply heat  to the painful areas of the face. Use a towel soaked in hot water. Or,  the shower and direct the hot spray onto your face. This is a good way to inhale warm water vapor and get heat on your face at the same time. (Cover your mouth and nose with your hands so you can still breathe as you do this.)  3. Use a vaporizer with products such as Vicks VapoRub (contains menthol) at night. Suck on peppermint, menthol or eucalyptus hard candies during the day.  4. An expectorant containing guaifenesin (such as Robitussin), helps to thin the mucus and promote drainage from the sinuses.  5. Over-the-counter decongestants may be used unless a similar medicine was prescribed. Nasal sprays work the fastest. Use one that contains phenylephrine (Alcon-synephrine, Sinex, Flonase or others). First blow the nose gently to remove mucus, then apply the drops. Do not use these medicines more often than directed on the label or for more than three days or symptoms may worsen. You may also use tablets containing pseudoephedrine (Sudafed). Many sinus remedies combine ingredients, which may increase side effects. Read the labels or ask the pharmacist for help. NOTE: Persons with high blood pressure should not use decongestants. They can raise blood pressure.  Look at CORICIDIN products instead.            6. Antihistamines are useful if allergies are a cause of your sinusitis. The mildest one is chlorpheniramine (available without a prescription). The dose for adults is 8-12mg three times a day. [NOTE: Do not use chlorpheniramine if you have glaucoma or if you are a man with trouble urinating due to an enlarged prostate.] Claritin (loratidine) is an antihistamine that causes less drowsiness and is a good alternative for daytime use.  7. When allergies are the cause for sinusitis, a saline nasal rinse alone may give relief. Saline nasal rinse reduces swelling and clears excess mucus. This allows sinuses to drain. Pre-packaged cans are  available at most drug stores. These contain pre-mixed salt in an irrigation device.  8. You may use acetaminophen (Tylenol) or ibuprofen (Motrin, Advil) to control pain, unless another pain medicine was prescribed. [ NOTE: If you have chronic liver or kidney disease or ever had a stomach ulcer, talk with your doctor before using these medicines.] (Aspirin should never be used in anyone under 18 years of age who is ill with a fever. It may cause severe liver damage.)  9. Using a Neti pot, or pre-mixed nasal saline rinse daily. This will help clear your sinuses of mucous:  Nasal saline rinses          Rinses help keep your sinuses and nose moist and flush out mucous. Use a pre-mixed rinse. Take a steam shower first. Then tilt your head to one side and spray. Allow the rinse to flow for a little bit to get into all the crevices. Then blow your nose. Repeat on the other nostril.  Humidification will also help- steam your head for 10 minutes, take a steam shower for 10 minutes, and ensure your dwelling has sufficient humidification.      Medications  Your doctor may prescribe medications to help treat your sinusitis. If you have an infection, antibiotics can help clear it up. If you are prescribed antibiotics, take all pills on schedule until they are gone, even if you feel better. Decongestants help relieve swelling. Use decongestant sprays for short periods only under the direction of your doctor. If you have allergies, your doctor may prescribe medications to help relieve them.   Decongestants- over-the-counter Pseudophedrine  Nasal corticosteroid spray- Nasonex, Flonase  Antihistamine- over-the-counter Claritin, Allegra, Zyrtec etc...   Apply Hot or Cold Packs  Applying heat to the area surrounding your sinuses may make you feel more comfortable. Use a hot water bottle or a hand towel dipped in hot water. Some people also find ice packs effective for relieving pain.  Get Prompt Medical Attention  if any of the  following occur:    Worsening sinus pain or headache    Stiff neck    Unusual drowsiness, confusion or not acting like your normal self    Swelling of the forehead or eyelids    Vision problems including blurred or double vision    Fever of 100.4 F (38 C) or higher, or as directed by your healthcare provider    Seizure    [id number] [employer], [address] [place] [address], [place] [address]. All rights reserved. This information is not intended as a substitute for professional medical care. Always follow your healthcare professional's instructions.

## 2020-05-02 LAB
BACTERIA SPEC CULT: NORMAL
Lab: NORMAL
SPECIMEN SOURCE: NORMAL

## 2020-06-15 DIAGNOSIS — R09.81 NASAL CONGESTION: ICD-10-CM

## 2020-06-15 RX ORDER — FLUTICASONE PROPIONATE 50 MCG
2 SPRAY, SUSPENSION (ML) NASAL DAILY
Qty: 9.9 ML | Refills: 3 | Status: SHIPPED | OUTPATIENT
Start: 2020-06-15 | End: 2020-11-11

## 2020-06-20 ENCOUNTER — E-VISIT (OUTPATIENT)
Dept: FAMILY MEDICINE | Facility: CLINIC | Age: 55
End: 2020-06-20
Payer: COMMERCIAL

## 2020-06-20 DIAGNOSIS — Z53.9 ERRONEOUS ENCOUNTER--DISREGARD: Primary | ICD-10-CM

## 2020-07-10 ENCOUNTER — TRANSFERRED RECORDS (OUTPATIENT)
Dept: HEALTH INFORMATION MANAGEMENT | Facility: CLINIC | Age: 55
End: 2020-07-10

## 2020-07-22 ENCOUNTER — OFFICE VISIT (OUTPATIENT)
Dept: FAMILY MEDICINE | Facility: CLINIC | Age: 55
End: 2020-07-22
Payer: COMMERCIAL

## 2020-07-22 VITALS
WEIGHT: 170 LBS | SYSTOLIC BLOOD PRESSURE: 120 MMHG | OXYGEN SATURATION: 100 % | DIASTOLIC BLOOD PRESSURE: 80 MMHG | HEIGHT: 64 IN | HEART RATE: 84 BPM | BODY MASS INDEX: 29.02 KG/M2

## 2020-07-22 DIAGNOSIS — Z12.11 COLON CANCER SCREENING: ICD-10-CM

## 2020-07-22 DIAGNOSIS — F19.11 H/O DRUG ABUSE (H): ICD-10-CM

## 2020-07-22 DIAGNOSIS — R11.0 NAUSEA: Primary | ICD-10-CM

## 2020-07-22 DIAGNOSIS — B18.2 CHRONIC HEPATITIS C WITHOUT HEPATIC COMA (H): ICD-10-CM

## 2020-07-22 PROCEDURE — 36415 COLL VENOUS BLD VENIPUNCTURE: CPT | Performed by: FAMILY MEDICINE

## 2020-07-22 PROCEDURE — 86706 HEP B SURFACE ANTIBODY: CPT | Performed by: FAMILY MEDICINE

## 2020-07-22 PROCEDURE — 99214 OFFICE O/P EST MOD 30 MIN: CPT | Performed by: FAMILY MEDICINE

## 2020-07-22 PROCEDURE — 87340 HEPATITIS B SURFACE AG IA: CPT | Performed by: FAMILY MEDICINE

## 2020-07-22 RX ORDER — ONDANSETRON 4 MG/1
4 TABLET, ORALLY DISINTEGRATING ORAL EVERY 6 HOURS PRN
Qty: 15 TABLET | Refills: 3 | Status: SHIPPED | OUTPATIENT
Start: 2020-07-22 | End: 2020-11-11

## 2020-07-22 ASSESSMENT — MIFFLIN-ST. JEOR: SCORE: 1351.11

## 2020-07-22 NOTE — PROGRESS NOTES
"Chief Complaint   Patient presents with     Hypertension     Nausea     S :Shelley Lew is a 55 year old female with some unexplained intermittent nausea.  zofran helps    Meth use :sober for 4 months.  Not in recovery program, hasn't felt need to use since mom .     Hep C: in remission.  Needs hep B looked into.      Needs mammo and colon screen.  Willing to do FIT test.  Mom  from complications  After colonoscopy, so that's a concern for her     Problem list, med list, additional histories reviewed and updated, as indicated.      O:/80 (Patient Position: Sitting, Cuff Size: Adult Large)   Pulse 84   Ht 1.626 m (5' 4\")   Wt 77.1 kg (170 lb)   LMP 2016 (Approximate)   SpO2 100%   Breastfeeding No   BMI 29.18 kg/m    GEN: Alert and oriented, in no acute distress    A: meth use: 4 months sober        Nausea, unexplained, intermittent        Hep C, treated.          Screening needs    P: hep B testing.  FIT test.  zofran prn.      F/u prn    TT 25 min, more than 1/2 that time counseling on colon screening options, hep c/b, staying sober, nausea, meds, and follow up    "

## 2020-07-23 LAB
HBV SURFACE AB SERPL IA-ACNC: 4.28 M[IU]/ML
HBV SURFACE AG SERPL QL IA: NONREACTIVE

## 2020-07-27 DIAGNOSIS — Z12.11 COLON CANCER SCREENING: ICD-10-CM

## 2020-07-27 PROCEDURE — 82274 ASSAY TEST FOR BLOOD FECAL: CPT | Performed by: FAMILY MEDICINE

## 2020-07-29 ENCOUNTER — TELEPHONE (OUTPATIENT)
Dept: FAMILY MEDICINE | Facility: CLINIC | Age: 55
End: 2020-07-29

## 2020-07-29 NOTE — TELEPHONE ENCOUNTER
I talked with Shelley and told her need to do hepatitis B series immunizations.  Hepatitis B testing nonreative  Kimberly José RN

## 2020-07-30 ENCOUNTER — ALLIED HEALTH/NURSE VISIT (OUTPATIENT)
Dept: FAMILY MEDICINE | Facility: CLINIC | Age: 55
End: 2020-07-30
Payer: COMMERCIAL

## 2020-07-30 DIAGNOSIS — Z23 ENCOUNTER FOR IMMUNIZATION: Primary | ICD-10-CM

## 2020-07-30 PROCEDURE — 90471 IMMUNIZATION ADMIN: CPT

## 2020-07-30 PROCEDURE — 99207 ZZC NO CHARGE NURSE ONLY: CPT

## 2020-07-30 PROCEDURE — 90746 HEPB VACCINE 3 DOSE ADULT IM: CPT

## 2020-07-30 NOTE — PROGRESS NOTES
Prior to immunization administration, verified patients identity using patient s name and date of birth. Please see Immunization Activity for additional information.     Screening Questionnaire for Adult Immunization    Are you sick today?   No   Do you have allergies to medications, food, a vaccine component or latex?   No   Have you ever had a serious reaction after receiving a vaccination?   No   Do you have a long-term health problem with heart, lung, kidney, or metabolic disease (e.g., diabetes), asthma, a blood disorder, no spleen, complement component deficiency, a cochlear implant, or a spinal fluid leak?  Are you on long-term aspirin therapy?   No   Do you have cancer, leukemia, HIV/AIDS, or any other immune system problem?   No   Do you have a parent, brother, or sister with an immune system problem?   No   In the past 3 months, have you taken medications that affect  your immune system, such as prednisone, other steroids, or anticancer drugs; drugs for the treatment of rheumatoid arthritis, Crohn s disease, or psoriasis; or have you had radiation treatments?   No   Have you had a seizure, or a brain or other nervous system problem?   No   During the past year, have you received a transfusion of blood or blood    products, or been given immune (gamma) globulin or antiviral drug?   No   For women: Are you pregnant or is there a chance you could become       pregnant during the next month?   No   Have you received any vaccinations in the past 4 weeks?   No     Immunization questionnaire answers were all negative.        Per orders of Dr. Sanches, injection of Engerix B given by Isela Rg LPN. Patient instructed to remain in clinic for 15 minutes afterwards, and to report any adverse reaction to me immediately.       Screening performed by Isela Rg LPN on 7/30/2020 at 2:36 PM.

## 2020-08-08 LAB — HEMOCCULT STL QL IA: POSITIVE

## 2020-08-10 ENCOUNTER — MYC MEDICAL ADVICE (OUTPATIENT)
Dept: FAMILY MEDICINE | Facility: CLINIC | Age: 55
End: 2020-08-10

## 2020-08-10 DIAGNOSIS — R19.5 POSITIVE FIT (FECAL IMMUNOCHEMICAL TEST): Primary | ICD-10-CM

## 2020-08-10 NOTE — TELEPHONE ENCOUNTER
Patient notified FIT is positive.  Explained why need to do the colonoscopy  Written order per Dr Pollard to order colonoscopy.  Kimberly José RN

## 2020-08-11 ENCOUNTER — HOSPITAL ENCOUNTER (OUTPATIENT)
Facility: CLINIC | Age: 55
End: 2020-08-11
Attending: SURGERY | Admitting: SURGERY
Payer: COMMERCIAL

## 2020-08-11 DIAGNOSIS — Z11.59 ENCOUNTER FOR SCREENING FOR OTHER VIRAL DISEASES: Primary | ICD-10-CM

## 2020-08-17 DIAGNOSIS — Z11.59 ENCOUNTER FOR SCREENING FOR OTHER VIRAL DISEASES: ICD-10-CM

## 2020-08-17 PROCEDURE — U0003 INFECTIOUS AGENT DETECTION BY NUCLEIC ACID (DNA OR RNA); SEVERE ACUTE RESPIRATORY SYNDROME CORONAVIRUS 2 (SARS-COV-2) (CORONAVIRUS DISEASE [COVID-19]), AMPLIFIED PROBE TECHNIQUE, MAKING USE OF HIGH THROUGHPUT TECHNOLOGIES AS DESCRIBED BY CMS-2020-01-R: HCPCS | Performed by: SURGERY

## 2020-08-18 ENCOUNTER — ANESTHESIA EVENT (OUTPATIENT)
Dept: SURGERY | Facility: CLINIC | Age: 55
End: 2020-08-18

## 2020-08-18 ENCOUNTER — NURSE TRIAGE (OUTPATIENT)
Dept: NURSING | Facility: CLINIC | Age: 55
End: 2020-08-18

## 2020-08-18 LAB
SARS-COV-2 RNA SPEC QL NAA+PROBE: NOT DETECTED
SPECIMEN SOURCE: NORMAL

## 2020-08-18 RX ORDER — ONDANSETRON 2 MG/ML
4 INJECTION INTRAMUSCULAR; INTRAVENOUS
Status: CANCELLED | OUTPATIENT
Start: 2020-08-18

## 2020-08-18 RX ORDER — SODIUM CHLORIDE, SODIUM LACTATE, POTASSIUM CHLORIDE, CALCIUM CHLORIDE 600; 310; 30; 20 MG/100ML; MG/100ML; MG/100ML; MG/100ML
INJECTION, SOLUTION INTRAVENOUS CONTINUOUS
Status: CANCELLED | OUTPATIENT
Start: 2020-08-18

## 2020-08-18 RX ORDER — LIDOCAINE 40 MG/G
CREAM TOPICAL
Status: CANCELLED | OUTPATIENT
Start: 2020-08-18

## 2020-08-18 ASSESSMENT — LIFESTYLE VARIABLES: TOBACCO_USE: 1

## 2020-08-18 NOTE — TELEPHONE ENCOUNTER
Ruthann calls to follow up on her COVID PCR test for 8/19 . She states that it was cancelled and does not know the reason why it was cancelled.    FNA advised that:  1) There is only 1 order for COVID PCR test by Dr. Gamez. Test done yesterday and COVID result was negative. This test needed prior to her colonoscopy tomorrow 8/19 in [facility].    2) She asks about a 2nd COVID test needed for her procedure on Friday 8/21. FNA probed and she stated that surgery is scheduled [facility]. States that she needs COVID testing prior to her ortho surgery.   - Since procedure is not within [facility], FNA advised to have her call [facility] tomorrow to get an order for COVID testing. Also advised her to let [facility] know she had a COVID test done on 8/17, result is in MyChart.    She verbalized understanding and will call [facility] in AM.    Toya Adams RN/[facility] Nurse Advisor        Additional Information    Question about upcoming scheduled test, no triage required and triager able to answer question    Protocols used: INFORMATION ONLY CALL-A-

## 2020-08-18 NOTE — ANESTHESIA PREPROCEDURE EVALUATION
Anesthesia Pre-Procedure Evaluation    Patient: Shelley Lew   MRN: [id number] : 1965          Preoperative Diagnosis: Positive FIT (fecal immunochemical test) [R19.5]    Procedure(s):  COLONOSCOPY    Past Medical History:   Diagnosis Date      (normal spontaneous vaginal delivery)     X2     PONV (postoperative nausea and vomiting)      Pyelonephritis, acute      Past Surgical History:   Procedure Laterality Date     CHOLECYSTECTOMY       LAMINECTOMY LUMBAR POSTERIOR MICROSCOPIC TWO LEVELS N/A 2017    Procedure: LAMINECTOMY LUMBAR POSTERIOR MICROSCOPIC TWO LEVELS;  Surgeon: Nehemias Bangura MD;  Location: [facility]     LAPAROSCOPY,TUBAL CAUTERY       RELEASE CARPAL TUNNEL  2013    Procedure: RELEASE CARPAL TUNNEL;  Left carpal tunnel release  ;  Surgeon: Yovani Gonzalez MD;  Location: [facility]     RELEASE CARPAL TUNNEL Right 10/15/2019    Procedure: Open Carpal Tunnel Release;  Surgeon: Yovani Gonzalez MD;  Location: [facility]       Anesthesia Evaluation     . Pt has had prior anesthetic. Type: General    History of anesthetic complications   - PONV        ROS/MED HX    ENT/Pulmonary:     (+)tobacco use, Current use , . .    Neurologic:       Cardiovascular:         METS/Exercise Tolerance:     Hematologic:         Musculoskeletal:         GI/Hepatic:     (+) hepatitis type C,       Renal/Genitourinary:     (+) Nephrolithiasis ,       Endo:         Psychiatric:         Infectious Disease:         Malignancy:         Other:                                 Lab Results   Component Value Date    WBC 4.6 2019    HGB 14.7 2019    HCT 43.1 2019     2019    CRP <2.9 10/20/2014     2019    POTASSIUM 3.6 2019    CHLORIDE 105 2019    CO2 28 2019    BUN 9 2019    CR 0.59 2019    GLC 98 2019    LANDON 9.1 2019    ALBUMIN 3.8 2019    PROTTOTAL 7.8 2019    ALT 23 2019    AST 20 2019    ALKPHOS 135  "12/26/2019    BILITOTAL 0.4 12/26/2019    INR 1.05 09/04/2019    TSH 0.76 03/24/2016    HCG Negative 04/11/2017       Preop Vitals  BP Readings from Last 3 Encounters:   07/22/20 120/80   05/01/20 (!) 125/101   03/16/20 108/74    Pulse Readings from Last 3 Encounters:   07/22/20 84   05/01/20 82   03/16/20 90      Resp Readings from Last 3 Encounters:   05/01/20 17   02/23/20 18   10/15/19 16    SpO2 Readings from Last 3 Encounters:   07/22/20 100%   05/01/20 97%   03/16/20 99%      Temp Readings from Last 1 Encounters:   05/01/20 35.9  C (96.7  F) (Oral)    Ht Readings from Last 1 Encounters:   07/22/20 1.626 m (5' 4\")      Wt Readings from Last 1 Encounters:   07/22/20 77.1 kg (170 lb)    Estimated body mass index is 29.18 kg/m  as calculated from the following:    Height as of 7/22/20: 1.626 m (5' 4\").    Weight as of 7/22/20: 77.1 kg (170 lb).                   Shara Law, ROBERTA CRNA  "

## 2020-08-19 ENCOUNTER — ANESTHESIA EVENT (OUTPATIENT)
Dept: GASTROENTEROLOGY | Facility: CLINIC | Age: 55
End: 2020-08-19
Payer: COMMERCIAL

## 2020-08-19 ENCOUNTER — NURSE TRIAGE (OUTPATIENT)
Dept: NURSING | Facility: CLINIC | Age: 55
End: 2020-08-19

## 2020-08-19 ENCOUNTER — OFFICE VISIT (OUTPATIENT)
Dept: FAMILY MEDICINE | Facility: CLINIC | Age: 55
End: 2020-08-19
Payer: COMMERCIAL

## 2020-08-19 ENCOUNTER — ANESTHESIA (OUTPATIENT)
Dept: SURGERY | Facility: CLINIC | Age: 55
End: 2020-08-19

## 2020-08-19 VITALS
BODY MASS INDEX: 28.68 KG/M2 | SYSTOLIC BLOOD PRESSURE: 132 MMHG | OXYGEN SATURATION: 98 % | HEIGHT: 64 IN | HEART RATE: 91 BPM | RESPIRATION RATE: 16 BRPM | TEMPERATURE: 97.5 F | WEIGHT: 168 LBS | DIASTOLIC BLOOD PRESSURE: 68 MMHG

## 2020-08-19 DIAGNOSIS — M79.2 RADICULAR PAIN IN RIGHT ARM: ICD-10-CM

## 2020-08-19 DIAGNOSIS — F19.11 H/O DRUG ABUSE (H): ICD-10-CM

## 2020-08-19 DIAGNOSIS — F17.200 TOBACCO USE DISORDER: ICD-10-CM

## 2020-08-19 DIAGNOSIS — B18.2 CHRONIC HEPATITIS C WITHOUT HEPATIC COMA (H): ICD-10-CM

## 2020-08-19 DIAGNOSIS — Z01.818 PREOP GENERAL PHYSICAL EXAM: Primary | ICD-10-CM

## 2020-08-19 PROCEDURE — 93000 ELECTROCARDIOGRAM COMPLETE: CPT | Performed by: FAMILY MEDICINE

## 2020-08-19 PROCEDURE — 99214 OFFICE O/P EST MOD 30 MIN: CPT | Performed by: FAMILY MEDICINE

## 2020-08-19 ASSESSMENT — MIFFLIN-ST. JEOR: SCORE: 1342.04

## 2020-08-19 ASSESSMENT — LIFESTYLE VARIABLES: TOBACCO_USE: 1

## 2020-08-19 NOTE — NURSING NOTE
"Chief Complaint   Patient presents with     Pre-Op Exam       Initial /68   Pulse 91   Temp 97.5  F (36.4  C) (Tympanic)   Resp 16   Ht 1.626 m (5' 4\")   Wt 76.2 kg (168 lb)   LMP 04/20/2016 (Approximate)   SpO2 98%   BMI 28.84 kg/m   Estimated body mass index is 28.84 kg/m  as calculated from the following:    Height as of this encounter: 1.626 m (5' 4\").    Weight as of this encounter: 76.2 kg (168 lb).    Patient presents to the clinic using No DME    Health Maintenance that is potentially due pending provider review:  Mammogram    Pt will schedule mammogram after she recovers from surgery    Is there anyone who you would like to be able to receive your results? No  If yes have patient fill out MARLEN    "

## 2020-08-19 NOTE — ANESTHESIA PREPROCEDURE EVALUATION
Anesthesia Pre-Procedure Evaluation    Patient: Shelley Lew   MRN: [id number] : 1965          Preoperative Diagnosis: Positive FIT (fecal immunochemical test) [R19.5]    Procedure(s):  COLONOSCOPY    Past Medical History:   Diagnosis Date      (normal spontaneous vaginal delivery)     X2     PONV (postoperative nausea and vomiting)      Pyelonephritis, acute      Past Surgical History:   Procedure Laterality Date     CHOLECYSTECTOMY       LAMINECTOMY LUMBAR POSTERIOR MICROSCOPIC TWO LEVELS N/A 2017    Procedure: LAMINECTOMY LUMBAR POSTERIOR MICROSCOPIC TWO LEVELS;  Surgeon: Nehemias Bangura MD;  Location: [facility]     LAPAROSCOPY,TUBAL CAUTERY       RELEASE CARPAL TUNNEL  2013    Procedure: RELEASE CARPAL TUNNEL;  Left carpal tunnel release  ;  Surgeon: Yovani Gonzalez MD;  Location: [facility]     RELEASE CARPAL TUNNEL Right 10/15/2019    Procedure: Open Carpal Tunnel Release;  Surgeon: Yovani Gonzalez MD;  Location: [facility]       Anesthesia Evaluation     . Pt has had prior anesthetic. Type: General    History of anesthetic complications   - PONV        ROS/MED HX    ENT/Pulmonary:     (+)tobacco use, Current use 2 cig /day packs/day  , . .    Neurologic:  - neg neurologic ROS     Cardiovascular:  - neg cardiovascular ROS       METS/Exercise Tolerance:     Hematologic:  - neg hematologic  ROS       Musculoskeletal: Comment: CTS  Spinal stenosis        GI/Hepatic: Comment: Hx of drug abuse- pt denies any current use      (+) bowel prep, hepatitis (pt treated last year and had f/u test for cure) type C, liver disease,       Renal/Genitourinary:     (+) chronic renal disease, Nephrolithiasis ,       Endo:  - neg endo ROS       Psychiatric:  - neg psychiatric ROS       Infectious Disease:  - neg infectious disease ROS       Malignancy:      - no malignancy   Other:                            Physical Exam  Normal systems: cardiovascular, pulmonary and dental    Airway   Mallampati: I  TM  "distance: >3 FB  Neck ROM: full    Dental     Cardiovascular       Pulmonary             Lab Results   Component Value Date    WBC 4.6 09/04/2019    HGB 14.7 09/04/2019    HCT 43.1 09/04/2019     09/04/2019    CRP <2.9 10/20/2014     09/04/2019    POTASSIUM 3.6 09/04/2019    CHLORIDE 105 09/04/2019    CO2 28 09/04/2019    BUN 9 09/04/2019    CR 0.59 09/04/2019    GLC 98 09/04/2019    LANDON 9.1 09/04/2019    ALBUMIN 3.8 12/26/2019    PROTTOTAL 7.8 12/26/2019    ALT 23 12/26/2019    AST 20 12/26/2019    ALKPHOS 135 12/26/2019    BILITOTAL 0.4 12/26/2019    INR 1.05 09/04/2019    TSH 0.76 03/24/2016    HCG Negative 04/11/2017       Preop Vitals  BP Readings from Last 3 Encounters:   07/22/20 120/80   05/01/20 (!) 125/101   03/16/20 108/74    Pulse Readings from Last 3 Encounters:   07/22/20 84   05/01/20 82   03/16/20 90      Resp Readings from Last 3 Encounters:   05/01/20 17   02/23/20 18   10/15/19 16    SpO2 Readings from Last 3 Encounters:   07/22/20 100%   05/01/20 97%   03/16/20 99%      Temp Readings from Last 1 Encounters:   05/01/20 35.9  C (96.7  F) (Oral)    Ht Readings from Last 1 Encounters:   07/22/20 1.626 m (5' 4\")      Wt Readings from Last 1 Encounters:   07/22/20 77.1 kg (170 lb)    Estimated body mass index is 29.18 kg/m  as calculated from the following:    Height as of 7/22/20: 1.626 m (5' 4\").    Weight as of 7/22/20: 77.1 kg (170 lb).       Anesthesia Plan      History & Physical Review  History and physical reviewed and following examination; no interval change.    ASA Status:  2 .    NPO Status:  > 4 hours    Plan for General and MAC Reason for MAC:  Deep or markedly invasive procedure (G8)           Postoperative Care      Consents  Anesthetic plan, risks, benefits and alternatives discussed with:  Patient..                   Gloria Kilpatrick APRN CRNA  "

## 2020-08-19 NOTE — TELEPHONE ENCOUNTER
Patient calling reporting she is scheduled for a colonoscopy at 1230 pm today at [facility].   Reporting she has not eaten or drank anything. Stating she did not do the prep as prescribed.    Patient is questioning if she can still have colonoscopy done?    [facility] Answering Service contacted for Surgery Center phone number  [phone]. Phone number provided to patient.     Caller verbalized understanding. Denies further questions.    Meghna Cary RN  [facility]      Reason for Disposition    [1] Caller requesting NON-URGENT health information AND [2] PCP's office is the best resource    Additional Information    Negative: [1] Caller is not with the adult (patient) AND [2] reporting urgent symptoms    Negative: Lab result questions    Negative: Medication questions    Negative: Caller can't be reached by phone    Negative: Caller has already spoken to PCP or another triager    Negative: RN needs further essential information from caller in order to complete triage    Negative: Requesting regular office appointment    Protocols used: INFORMATION ONLY CALL-A-

## 2020-08-19 NOTE — PROGRESS NOTES
[facility]  [address] [place] [id number]  [phone]  Dept: [phone]    PRE-OP EVALUATION:  Today's date: 2020    Shelley Lew (: 1965) presents for pre-operative evaluation assessment as requested by Dr. Palmer.  She requires evaluation and anesthesia risk assessment prior to undergoing surgery/procedure for treatment of Decompression Rt arm .      Primary Physician: Seth Pollard  Type of Anesthesia Anticipated: General    Preop Questionnnaire:  Pre-op Questionnaire 2020   Surgery Location: [facility]   Surgeon: -   Surgery/Procedure: Decompression on right arm   Surgery Date: 20   Time of Surgery: 2pm   Where patient plans to recover: At home with family   Have you ever had a heart attack or stroke? No   Have you ever had surgery on your heart or blood vessels, such as a stent placement, a coronary artery bypass, or surgery on an artery in your head, neck, heart, or legs? No   Do you have chest pain with activity? No   Do you have a history of  heart failure? No   Do you currently have a cold, bronchitis or symptoms of other infection? No   Do you have a cough, shortness of breath, or wheezing? No   Do you or anyone in your family have previous history of blood clots? No   Do you or does anyone in your family have a serious bleeding problem such as prolonged bleeding following surgeries or cuts? No   Have you ever had problems with anemia or been told to take iron pills? No   Have you had any abnormal blood loss such as black, tarry or bloody stools, or abnormal vaginal bleeding? No   Have you ever had a blood transfusion? No   Are you willing to have a blood transfusion if it is medically needed before, during, or after your surgery? Yes   Have you or any of your relatives ever had problems with anesthesia? No   Do you have sleep apnea, excessive snoring or daytime drowsiness? No   Do you have any artifical heart valves or other implanted  medical devices like a pacemaker, defibrillator, or continuous glucose monitor? No   Do you have artificial joints? No   Are you allergic to latex? No   Is there any chance that you may be pregnant? No         HPI:     R arm pain, having nerve decompression    Hx meth use: sober for  Several months.  Doing well    Chronic hep C: has been treated.  Felt to be cured.     Smoker        MEDICAL HISTORY:     Patient Active Problem List    Diagnosis Date Noted     Chronic hepatitis C without hepatic coma (H) 10/09/2019     Priority: Medium     Hyperlipidemia LDL goal <130 07/06/2017     Priority: Medium     H/O drug abuse (H) 07/06/2017     Priority: Medium     Intermittent meth use        Spinal stenosis of lumbar region with neurogenic claudication 02/16/2017     Priority: Medium     Spine surg referral.  Many MRI findings.  Severely limited by this.    Intermittent meth use has been an issue for her, would be careful with opioids.         Tobacco use disorder 02/16/2017     Priority: Medium     Health Care Home 03/31/2016     Priority: Medium       Status:  Unable to reach  Care Coordinator:  Nabila Coffman    See Letters for H Care Plan  Date:  March 31, 2016           CTS (carpal tunnel syndrome) 05/15/2013     Priority: Medium     CARDIOVASCULAR SCREENING; LDL GOAL LESS THAN 160 10/31/2010     Priority: Medium        Past Surgical History:   Procedure Laterality Date     CHOLECYSTECTOMY       LAMINECTOMY LUMBAR POSTERIOR MICROSCOPIC TWO LEVELS N/A 4/11/2017    Procedure: LAMINECTOMY LUMBAR POSTERIOR MICROSCOPIC TWO LEVELS;  Surgeon: Nehemias Bangura MD;  Location: [facility]     LAPAROSCOPY,TUBAL CAUTERY       RELEASE CARPAL TUNNEL  5/21/2013    Procedure: RELEASE CARPAL TUNNEL;  Left carpal tunnel release  ;  Surgeon: Yovani Gonzalez MD;  Location: [facility]     RELEASE CARPAL TUNNEL Right 10/15/2019    Procedure: Open Carpal Tunnel Release;  Surgeon: Yovani Gonzalez MD;  Location: [facility]     OTC products:  "None, except as noted above    Not taking any meds currently      Allergies   Allergen Reactions     Percocet [Oxycodone-Acetaminophen] Nausea and Vomiting     Nausea vomiting and head swimming     Vicodin [Hydrocodone-Acetaminophen] Nausea and Vomiting      Latex Allergy: NO    Social History     Tobacco Use     Smoking status: Light Tobacco Smoker     Types: Cigarettes     Smokeless tobacco: Never Used     Tobacco comment: one pack lasts a week   Substance Use Topics     Alcohol use: Not Currently     Comment: OCC., few sips of wine 9/3/19     History   Drug Use     Types: Methamphetamines     Comment: smokes, not IV       REVIEW OF SYSTEMS:   No  Cp or sob.  No fever.  No LE edema.  No fever or cough or urine changes.   No rash    EXAM:   LMP 04/20/2016 (Approximate)   /68   Pulse 91   Temp 97.5  F (36.4  C) (Tympanic)   Resp 16   Ht 1.626 m (5' 4\")   Wt 76.2 kg (168 lb)   LMP 04/20/2016 (Approximate)   SpO2 98%   BMI 28.84 kg/m    Gen: alert and oriented, in no acute distress, affect within normal limits  Neck: supple with no masses or nodes  Throat: oropharynx clear, no exudate or tonsillar/palate asymmetry.    CV: RRR, no murmur  Lungs: clear bilaterally with good effort  Abd: nontender, no mass  Ext: no edema or lesions   Neuro: moving all extremities, gait normal, no focal deficts noted      DIAGNOSTICS:   EKG: NSR, no ST or T wave changes.  Normal axis, intervals.  No Q waves.      Recent Labs   Lab Test 09/04/19  1206 08/15/16  1347 03/24/16  1521   HGB 14.7  --  13.3     --  280   INR 1.05  --   --     138 141   POTASSIUM 3.6 4.3 3.7   CR 0.59 0.73 0.65        IMPRESSION:   Pre op  Radicular arm pain, R  Hx chem dep, sober for several months  Tobacco use  Hx of hep C, now with sustained viral response      The proposed surgical procedure is considered INTERMEDIATE risk.    REVISED CARDIAC RISK INDEX  The patient has the following serious cardiovascular risks for perioperative " complications such as (MI, PE, VFib and 3  AV Block):  No serious cardiac risks  INTERPRETATION: 0 risks: Class I (very low risk - 0.4% complication rate)    The patient has the following additional risks for perioperative complications:  Tobacco use        RECOMMENDATIONS:     [place] prescribing database reviewed, no controlled substances in last 6 months     APPROVAL GIVEN to proceed with proposed procedure, without further diagnostic evaluation       Signed Electronically by: Seth Pollard MD

## 2020-08-19 NOTE — PATIENT INSTRUCTIONS

## 2020-08-20 ENCOUNTER — ANESTHESIA (OUTPATIENT)
Dept: GASTROENTEROLOGY | Facility: CLINIC | Age: 55
End: 2020-08-20
Payer: COMMERCIAL

## 2020-08-20 ENCOUNTER — HOSPITAL ENCOUNTER (OUTPATIENT)
Facility: CLINIC | Age: 55
Discharge: HOME | End: 2020-08-20
Attending: SURGERY | Admitting: SURGERY
Payer: COMMERCIAL

## 2020-08-20 VITALS
SYSTOLIC BLOOD PRESSURE: 127 MMHG | HEIGHT: 64 IN | WEIGHT: 165 LBS | OXYGEN SATURATION: 98 % | HEART RATE: 88 BPM | DIASTOLIC BLOOD PRESSURE: 81 MMHG | BODY MASS INDEX: 28.17 KG/M2 | TEMPERATURE: 97.6 F | RESPIRATION RATE: 14 BRPM

## 2020-08-20 LAB — COLONOSCOPY: NORMAL

## 2020-08-20 PROCEDURE — 37000008 ZZH ANESTHESIA TECHNICAL FEE, 1ST 30 MIN: Performed by: SURGERY

## 2020-08-20 PROCEDURE — G0121 COLON CA SCRN NOT HI RSK IND: HCPCS | Performed by: SURGERY

## 2020-08-20 PROCEDURE — 25800030 ZZH RX IP 258 OP 636: Performed by: SURGERY

## 2020-08-20 PROCEDURE — 25000128 H RX IP 250 OP 636: Performed by: NURSE ANESTHETIST, CERTIFIED REGISTERED

## 2020-08-20 PROCEDURE — 25000125 ZZHC RX 250: Performed by: NURSE ANESTHETIST, CERTIFIED REGISTERED

## 2020-08-20 PROCEDURE — 25000125 ZZHC RX 250: Performed by: SURGERY

## 2020-08-20 PROCEDURE — 45378 DIAGNOSTIC COLONOSCOPY: CPT | Performed by: SURGERY

## 2020-08-20 RX ORDER — GLYCOPYRROLATE 0.2 MG/ML
INJECTION, SOLUTION INTRAMUSCULAR; INTRAVENOUS PRN
Status: DISCONTINUED | OUTPATIENT
Start: 2020-08-20 | End: 2020-08-20

## 2020-08-20 RX ORDER — LIDOCAINE HYDROCHLORIDE 10 MG/ML
INJECTION, SOLUTION INFILTRATION; PERINEURAL PRN
Status: DISCONTINUED | OUTPATIENT
Start: 2020-08-20 | End: 2020-08-20

## 2020-08-20 RX ORDER — SODIUM CHLORIDE, SODIUM LACTATE, POTASSIUM CHLORIDE, CALCIUM CHLORIDE 600; 310; 30; 20 MG/100ML; MG/100ML; MG/100ML; MG/100ML
INJECTION, SOLUTION INTRAVENOUS CONTINUOUS
Status: DISCONTINUED | OUTPATIENT
Start: 2020-08-20 | End: 2020-08-20 | Stop reason: HOSPADM

## 2020-08-20 RX ORDER — PROPOFOL 10 MG/ML
INJECTION, EMULSION INTRAVENOUS CONTINUOUS PRN
Status: DISCONTINUED | OUTPATIENT
Start: 2020-08-20 | End: 2020-08-20

## 2020-08-20 RX ORDER — PROPOFOL 10 MG/ML
INJECTION, EMULSION INTRAVENOUS PRN
Status: DISCONTINUED | OUTPATIENT
Start: 2020-08-20 | End: 2020-08-20

## 2020-08-20 RX ORDER — ONDANSETRON 2 MG/ML
4 INJECTION INTRAMUSCULAR; INTRAVENOUS
Status: DISCONTINUED | OUTPATIENT
Start: 2020-08-20 | End: 2020-08-20 | Stop reason: HOSPADM

## 2020-08-20 RX ORDER — LIDOCAINE 40 MG/G
CREAM TOPICAL
Status: DISCONTINUED | OUTPATIENT
Start: 2020-08-20 | End: 2020-08-20 | Stop reason: HOSPADM

## 2020-08-20 RX ADMIN — PROPOFOL 50 MG: 10 INJECTION, EMULSION INTRAVENOUS at 09:31

## 2020-08-20 RX ADMIN — PROPOFOL 200 MCG/KG/MIN: 10 INJECTION, EMULSION INTRAVENOUS at 09:29

## 2020-08-20 RX ADMIN — SODIUM CHLORIDE, POTASSIUM CHLORIDE, SODIUM LACTATE AND CALCIUM CHLORIDE: 600; 310; 30; 20 INJECTION, SOLUTION INTRAVENOUS at 09:17

## 2020-08-20 RX ADMIN — GLYCOPYRROLATE 0.2 MG: 0.2 INJECTION, SOLUTION INTRAMUSCULAR; INTRAVENOUS at 09:26

## 2020-08-20 RX ADMIN — LIDOCAINE HYDROCHLORIDE 1 ML: 10 INJECTION, SOLUTION EPIDURAL; INFILTRATION; INTRACAUDAL; PERINEURAL at 09:17

## 2020-08-20 RX ADMIN — LIDOCAINE HYDROCHLORIDE 50 MG: 10 INJECTION, SOLUTION INFILTRATION; PERINEURAL at 09:29

## 2020-08-20 RX ADMIN — PROPOFOL 100 MG: 10 INJECTION, EMULSION INTRAVENOUS at 09:29

## 2020-08-20 ASSESSMENT — MIFFLIN-ST. JEOR: SCORE: 1328.44

## 2020-08-20 NOTE — ANESTHESIA CARE TRANSFER NOTE
Patient: Shelley Lew    Procedure(s):  COLONOSCOPY    Diagnosis: Special screening for malignant neoplasms, colon [Z12.11]  Diagnosis Additional Information: No value filed.    Anesthesia Type:   General, MAC     Note:  Airway :Room Air  Patient transferred to:Phase II  Comments: Patient's VSS. Spontaneous respirations. Patient awake and oriented. IV patent. Report to RN.Handoff Report: Identifed the Patient, Identified the Reponsible Provider, Reviewed the pertinent medical history, Discussed the surgical course, Reviewed Intra-OP anesthesia mangement and issues during anesthesia, Set expectations for post-procedure period and Allowed opportunity for questions and acknowledgement of understanding      Vitals: (Last set prior to Anesthesia Care Transfer)    CRNA VITALS  8/20/2020 0915 - 8/20/2020 0946      8/20/2020             Pulse:  86    Ht Rate:  87    SpO2:  99 %                Electronically Signed By: ROBERTA Kelley CRNA  August 20, 2020  9:46 AM

## 2020-08-20 NOTE — ANESTHESIA POSTPROCEDURE EVALUATION
Patient: Shelley Lew    Procedure(s):  COLONOSCOPY    Diagnosis:Special screening for malignant neoplasms, colon [Z12.11]  Diagnosis Additional Information: No value filed.    Anesthesia Type:  General, MAC    Note:  Anesthesia Post Evaluation    Patient location during evaluation: Phase 2 and Bedside  Patient participation: Able to fully participate in evaluation  Level of consciousness: awake and alert  Pain management: adequate  Airway patency: patent  Cardiovascular status: acceptable  Respiratory status: acceptable  Hydration status: acceptable  PONV: none     Anesthetic complications: None          Last vitals:  Vitals:    08/20/20 0859   BP: 127/86   Pulse: 81   Resp: 16   Temp: 36.4  C (97.6  F)   SpO2: 100%         Electronically Signed By: ROBERTA Kelley CRNA  August 20, 2020  9:46 AM

## 2020-10-14 ENCOUNTER — HOSPITAL ENCOUNTER (EMERGENCY)
Facility: CLINIC | Age: 55
Discharge: HOME | End: 2020-10-14
Attending: EMERGENCY MEDICINE | Admitting: EMERGENCY MEDICINE
Payer: COMMERCIAL

## 2020-10-14 VITALS
OXYGEN SATURATION: 99 % | SYSTOLIC BLOOD PRESSURE: 130 MMHG | WEIGHT: 165 LBS | DIASTOLIC BLOOD PRESSURE: 86 MMHG | TEMPERATURE: 98.2 F | RESPIRATION RATE: 16 BRPM | BODY MASS INDEX: 28.32 KG/M2 | HEART RATE: 89 BPM

## 2020-10-14 DIAGNOSIS — N30.00 ACUTE CYSTITIS WITHOUT HEMATURIA: ICD-10-CM

## 2020-10-14 LAB
ALBUMIN SERPL-MCNC: 4.1 G/DL (ref 3.4–5)
ALBUMIN UR-MCNC: NEGATIVE MG/DL
ALP SERPL-CCNC: 96 U/L (ref 40–150)
ALT SERPL W P-5'-P-CCNC: 24 U/L (ref 0–50)
ANION GAP SERPL CALCULATED.3IONS-SCNC: 4 MMOL/L (ref 3–14)
APPEARANCE UR: ABNORMAL
AST SERPL W P-5'-P-CCNC: 18 U/L (ref 0–45)
BACTERIA #/AREA URNS HPF: ABNORMAL /HPF
BASOPHILS # BLD AUTO: 0 10E9/L (ref 0–0.2)
BASOPHILS NFR BLD AUTO: 0.6 %
BILIRUB SERPL-MCNC: 0.4 MG/DL (ref 0.2–1.3)
BILIRUB UR QL STRIP: NEGATIVE
BUN SERPL-MCNC: 10 MG/DL (ref 7–30)
CALCIUM SERPL-MCNC: 9.6 MG/DL (ref 8.5–10.1)
CHLORIDE SERPL-SCNC: 108 MMOL/L (ref 94–109)
CO2 SERPL-SCNC: 29 MMOL/L (ref 20–32)
COLOR UR AUTO: YELLOW
CREAT SERPL-MCNC: 0.79 MG/DL (ref 0.52–1.04)
DIFFERENTIAL METHOD BLD: NORMAL
EOSINOPHIL NFR BLD AUTO: 2 %
ERYTHROCYTE [DISTWIDTH] IN BLOOD BY AUTOMATED COUNT: 12.4 % (ref 10–15)
GFR SERPL CREATININE-BSD FRML MDRD: 84 ML/MIN/{1.73_M2}
GLUCOSE SERPL-MCNC: 95 MG/DL (ref 70–99)
GLUCOSE UR STRIP-MCNC: NEGATIVE MG/DL
HCT VFR BLD AUTO: 43.2 % (ref 35–47)
HGB BLD-MCNC: 14.6 G/DL (ref 11.7–15.7)
HGB UR QL STRIP: ABNORMAL
IMM GRANULOCYTES # BLD: 0 10E9/L (ref 0–0.4)
IMM GRANULOCYTES NFR BLD: 0.2 %
KETONES UR STRIP-MCNC: NEGATIVE MG/DL
LACTATE BLD-SCNC: 0.6 MMOL/L (ref 0.7–2)
LEUKOCYTE ESTERASE UR QL STRIP: ABNORMAL
LYMPHOCYTES # BLD AUTO: 1.9 10E9/L (ref 0.8–5.3)
LYMPHOCYTES NFR BLD AUTO: 28.5 %
MCH RBC QN AUTO: 31.5 PG (ref 26.5–33)
MCHC RBC AUTO-ENTMCNC: 33.8 G/DL (ref 31.5–36.5)
MCV RBC AUTO: 93 FL (ref 78–100)
MONOCYTES # BLD AUTO: 0.5 10E9/L (ref 0–1.3)
MONOCYTES NFR BLD AUTO: 7.3 %
MUCOUS THREADS #/AREA URNS LPF: PRESENT /LPF
NEUTROPHILS # BLD AUTO: 4.1 10E9/L (ref 1.6–8.3)
NEUTROPHILS NFR BLD AUTO: 61.4 %
NITRATE UR QL: NEGATIVE
NRBC # BLD AUTO: 0 10*3/UL
NRBC BLD AUTO-RTO: 0 /100
PH UR STRIP: 5 PH (ref 5–7)
PLATELET # BLD AUTO: 273 10E9/L (ref 150–450)
POTASSIUM SERPL-SCNC: 3.9 MMOL/L (ref 3.4–5.3)
PROT SERPL-MCNC: 7.9 G/DL (ref 6.8–8.8)
RBC # BLD AUTO: 4.64 10E12/L (ref 3.8–5.2)
RBC #/AREA URNS AUTO: 10 /HPF (ref 0–2)
SODIUM SERPL-SCNC: 141 MMOL/L (ref 133–144)
SOURCE: ABNORMAL
SP GR UR STRIP: 1.02 (ref 1–1.03)
SQUAMOUS #/AREA URNS AUTO: 7 /HPF (ref 0–1)
UROBILINOGEN UR STRIP-MCNC: 0 MG/DL (ref 0–2)
WBC # BLD AUTO: 6.6 10E9/L (ref 4–11)
WBC #/AREA URNS AUTO: 69 /HPF (ref 0–5)

## 2020-10-14 PROCEDURE — 87088 URINE BACTERIA CULTURE: CPT | Performed by: EMERGENCY MEDICINE

## 2020-10-14 PROCEDURE — 96361 HYDRATE IV INFUSION ADD-ON: CPT | Performed by: EMERGENCY MEDICINE

## 2020-10-14 PROCEDURE — 250N000011 HC RX IP 250 OP 636: Performed by: EMERGENCY MEDICINE

## 2020-10-14 PROCEDURE — 83605 ASSAY OF LACTIC ACID: CPT | Performed by: EMERGENCY MEDICINE

## 2020-10-14 PROCEDURE — 87086 URINE CULTURE/COLONY COUNT: CPT | Performed by: EMERGENCY MEDICINE

## 2020-10-14 PROCEDURE — 87186 SC STD MICRODIL/AGAR DIL: CPT | Performed by: EMERGENCY MEDICINE

## 2020-10-14 PROCEDURE — 85025 COMPLETE CBC W/AUTO DIFF WBC: CPT | Performed by: EMERGENCY MEDICINE

## 2020-10-14 PROCEDURE — 81001 URINALYSIS AUTO W/SCOPE: CPT | Performed by: EMERGENCY MEDICINE

## 2020-10-14 PROCEDURE — 99284 EMERGENCY DEPT VISIT MOD MDM: CPT | Mod: 25 | Performed by: EMERGENCY MEDICINE

## 2020-10-14 PROCEDURE — 99284 EMERGENCY DEPT VISIT MOD MDM: CPT | Performed by: EMERGENCY MEDICINE

## 2020-10-14 PROCEDURE — 258N000003 HC RX IP 258 OP 636: Performed by: EMERGENCY MEDICINE

## 2020-10-14 PROCEDURE — 80053 COMPREHEN METABOLIC PANEL: CPT | Performed by: EMERGENCY MEDICINE

## 2020-10-14 PROCEDURE — 96365 THER/PROPH/DIAG IV INF INIT: CPT | Performed by: EMERGENCY MEDICINE

## 2020-10-14 RX ORDER — CEFTRIAXONE 1 G/1
1 INJECTION, POWDER, FOR SOLUTION INTRAMUSCULAR; INTRAVENOUS ONCE
Status: COMPLETED | OUTPATIENT
Start: 2020-10-14 | End: 2020-10-14

## 2020-10-14 RX ORDER — FLUCONAZOLE 150 MG/1
TABLET ORAL
Qty: 2 TABLET | Refills: 0 | Status: SHIPPED | OUTPATIENT
Start: 2020-10-14 | End: 2020-10-17

## 2020-10-14 RX ORDER — CIPROFLOXACIN 500 MG/1
500 TABLET, FILM COATED ORAL 2 TIMES DAILY
Qty: 14 TABLET | Refills: 0 | Status: SHIPPED | OUTPATIENT
Start: 2020-10-14 | End: 2020-10-21

## 2020-10-14 RX ORDER — SODIUM CHLORIDE 9 MG/ML
INJECTION, SOLUTION INTRAVENOUS CONTINUOUS
Status: DISCONTINUED | OUTPATIENT
Start: 2020-10-14 | End: 2020-10-14 | Stop reason: HOSPADM

## 2020-10-14 RX ADMIN — CEFTRIAXONE SODIUM 1 G: 1 INJECTION, POWDER, FOR SOLUTION INTRAMUSCULAR; INTRAVENOUS at 17:20

## 2020-10-14 RX ADMIN — SODIUM CHLORIDE 1000 ML: 9 INJECTION, SOLUTION INTRAVENOUS at 16:45

## 2020-10-14 NOTE — ED PROVIDER NOTES
History     Chief Complaint   Patient presents with     Rule out Urinary Tract Infection     HPI  Shelley Lew is a 55 year old female who presents with concerns for treatment and urinary symptoms for the last 2 weeks.  She has had previous urinary tract infections including pyelonephritis and sepsis.  With this episode she denies fever chills.  Does have back pain but states she has chronic back pain with spinal stenosis.  No saddle paresthesias or loss of bowel or bladder.  Denies any diarrhea or change in her stooling pattern.  Denies any nausea or vomiting.  No upper respiratory symptoms.  Denies cough, chest pain or shortness of breath.  Treatment for symptoms prior to arrival today.  No exposure to infectious illness.  Patient's had a tubal ligation.  Been diagnosed with chronic hep C but states she was treated and felt to be cured.    Allergies:  Allergies   Allergen Reactions     Percocet [Oxycodone-Acetaminophen] Nausea and Vomiting     Nausea vomiting and head swimming     Vicodin [Hydrocodone-Acetaminophen] Nausea and Vomiting       Problem List:    Patient Active Problem List    Diagnosis Date Noted     Chronic hepatitis C without hepatic coma (H) 10/09/2019     Priority: Medium     Treated.  Sustained response.  Felt to be cured        Hyperlipidemia LDL goal <130 07/06/2017     Priority: Medium     H/O drug abuse (H) 07/06/2017     Priority: Medium     Intermittent meth use . Sober as of 7/20       Spinal stenosis of lumbar region with neurogenic claudication 02/16/2017     Priority: Medium     Spine surg referral.  Many MRI findings.  Severely limited by this.    Intermittent meth use has been an issue for her, would be careful with opioids.         Tobacco use disorder 02/16/2017     Priority: Medium     Health Care Home 03/31/2016     Priority: Medium       Status:  Unable to reach  Care Coordinator:  Nabila Coffman    See Letters for HC Care Plan  Date:  March 31, 2016           CTS (carpal  tunnel syndrome) 05/15/2013     Priority: Medium     CARDIOVASCULAR SCREENING; LDL GOAL LESS THAN 160 10/31/2010     Priority: Medium        Past Medical History:    Past Medical History:   Diagnosis Date      (normal spontaneous vaginal delivery)      PONV (postoperative nausea and vomiting)      Pyelonephritis, acute        Past Surgical History:    Past Surgical History:   Procedure Laterality Date     CHOLECYSTECTOMY       COLONOSCOPY N/A 2020    Procedure: COLONOSCOPY;  Surgeon: Shawn Amor MD;  Location: [facility]     LAMINECTOMY LUMBAR POSTERIOR MICROSCOPIC TWO LEVELS N/A 2017    Procedure: LAMINECTOMY LUMBAR POSTERIOR MICROSCOPIC TWO LEVELS;  Surgeon: Nehemias Bangura MD;  Location: [facility]     LAPAROSCOPY,TUBAL CAUTERY       RELEASE CARPAL TUNNEL  2013    Procedure: RELEASE CARPAL TUNNEL;  Left carpal tunnel release  ;  Surgeon: Yovani Gonzalez MD;  Location: [facility]     RELEASE CARPAL TUNNEL Right 10/15/2019    Procedure: Open Carpal Tunnel Release;  Surgeon: Yovani Gonzalez MD;  Location: [facility]       Family History:    Family History   Problem Relation Age of Onset     Cancer Sister         cervical     Cancer Sister         cervical       Social History:  Marital Status:  Single [1]  Social History     Tobacco Use     Smoking status: Light Tobacco Smoker     Types: Cigarettes     Smokeless tobacco: Never Used     Tobacco comment: one pack lasts a week   Substance Use Topics     Alcohol use: Not Currently     Comment: OCC., few sips of wine 9/3/19     Drug use: Yes     Types: Methamphetamines     Comment: smokes, not IV        Medications:         ciprofloxacin (CIPRO) 500 MG tablet       cetirizine (ZYRTEC) 10 MG tablet       fluticasone (FLONASE) 50 MCG/ACT nasal spray       meclizine (ANTIVERT) 25 MG tablet       ondansetron (ZOFRAN ODT) 4 MG ODT tab          Review of Systems all other systems are reviewed and are negative.    Physical Exam   BP: 130/86  Pulse: 89  Temp:  98.2  F (36.8  C)  Resp: 16  Weight: 74.8 kg (165 lb)  SpO2: 99 %      Physical Exam general alert cooperative female does not look toxic or ill.  HEENT shows no scleral icterus.  She has mild left CVA tenderness to percussion.  Abdomen reveals active bowel sounds and no localizing tenderness, masses, organomegaly.  No skin rash over flank or abdomen.    ED Course        Procedures               Critical Care time:  none               Results for orders placed or performed during the hospital encounter of 10/14/20 (from the past 24 hour(s))   CBC with platelets differential   Result Value Ref Range    WBC 6.6 4.0 - 11.0 10e9/L    RBC Count 4.64 3.8 - 5.2 10e12/L    Hemoglobin 14.6 11.7 - 15.7 g/dL    Hematocrit 43.2 35.0 - 47.0 %    MCV 93 78 - 100 fl    MCH 31.5 26.5 - 33.0 pg    MCHC 33.8 31.5 - 36.5 g/dL    RDW 12.4 10.0 - 15.0 %    Platelet Count 273 150 - 450 10e9/L    Diff Method Automated Method     % Neutrophils 61.4 %    % Lymphocytes 28.5 %    % Monocytes 7.3 %    % Eosinophils 2.0 %    % Basophils 0.6 %    % Immature Granulocytes 0.2 %    Nucleated RBCs 0 0 /100    Absolute Neutrophil 4.1 1.6 - 8.3 10e9/L    Absolute Lymphocytes 1.9 0.8 - 5.3 10e9/L    Absolute Monocytes 0.5 0.0 - 1.3 10e9/L    Absolute Basophils 0.0 0.0 - 0.2 10e9/L    Abs Immature Granulocytes 0.0 0 - 0.4 10e9/L    Absolute Nucleated RBC 0.0    Comprehensive metabolic panel   Result Value Ref Range    Sodium 141 133 - 144 mmol/L    Potassium 3.9 3.4 - 5.3 mmol/L    Chloride 108 94 - 109 mmol/L    Carbon Dioxide 29 20 - 32 mmol/L    Anion Gap 4 3 - 14 mmol/L    Glucose 95 70 - 99 mg/dL    Urea Nitrogen 10 7 - 30 mg/dL    Creatinine 0.79 0.52 - 1.04 mg/dL    GFR Estimate 84 >60 mL/min/[1.73_m2]    GFR Estimate If Black >90 >60 mL/min/[1.73_m2]    Calcium 9.6 8.5 - 10.1 mg/dL    Bilirubin Total 0.4 0.2 - 1.3 mg/dL    Albumin 4.1 3.4 - 5.0 g/dL    Protein Total 7.9 6.8 - 8.8 g/dL    Alkaline Phosphatase 96 40 - 150 U/L    ALT 24 0 - 50 U/L     AST 18 0 - 45 U/L   Lactic acid whole blood   Result Value Ref Range    Lactic Acid 0.6 (L) 0.7 - 2.0 mmol/L   Routine UA with microscopic   Result Value Ref Range    Color Urine Yellow     Appearance Urine Slightly Cloudy     Glucose Urine Negative NEG^Negative mg/dL    Bilirubin Urine Negative NEG^Negative    Ketones Urine Negative NEG^Negative mg/dL    Specific Gravity Urine 1.025 1.003 - 1.035    Blood Urine Moderate (A) NEG^Negative    pH Urine 5.0 5.0 - 7.0 pH    Protein Albumin Urine Negative NEG^Negative mg/dL    Urobilinogen mg/dL 0.0 0.0 - 2.0 mg/dL    Nitrite Urine Negative NEG^Negative    Leukocyte Esterase Urine Moderate (A) NEG^Negative    Source Midstream Urine     WBC Urine 69 (H) 0 - 5 /HPF    RBC Urine 10 (H) 0 - 2 /HPF    Bacteria Urine Moderate (A) NEG^Negative /HPF    Squamous Epithelial /HPF Urine 7 (H) 0 - 1 /HPF    Mucous Urine Present (A) NEG^Negative /LPF       Medications   0.9% sodium chloride BOLUS (1,000 mLs Intravenous New Bag 10/14/20 1645)     Followed by   sodium chloride 0.9% infusion (has no administration in time range)   cefTRIAXone (ROCEPHIN) 1 g vial to attach to  mL bag for ADULTS or NS 50 mL bag for PEDS (1 g Intravenous New Bag 10/14/20 1720)       Assessments & Plan (with Medical Decision Making)   Shelley Lew is a 55 year old female who presents with concerns for treatment and urinary symptoms for the last 2 weeks.  She has had previous urinary tract infections including pyelonephritis and sepsis.  With this episode she denies fever chills.  Does have back pain but states she has chronic back pain with spinal stenosis.  No saddle paresthesias or loss of bowel or bladder.  Denies any diarrhea or change in her stooling pattern.  Denies any nausea or vomiting.  No upper respiratory symptoms.  Denies cough, chest pain or shortness of breath.  Treatment for symptoms prior to arrival today.  No exposure to infectious illness.  Patient's had a tubal ligation.  Been  diagnosed with chronic hep C but states she was treated and felt to be cured.  On presentation patient was afebrile and vitally stable.  She had mild left CVA tenderness.  Benign abdominal exam.  Urine was positive as above.  Urine cultures pending.  She has a normal white count.  Renal function was.  She is given IV Rocephin.  Information on urinary tract infection is provided.  Cipro twice daily for a week.  Reasons to return to emergency room were discussed.  I have reviewed the nursing notes.    I have reviewed the findings, diagnosis, plan and need for follow up with the patient.       New Prescriptions    CIPROFLOXACIN (CIPRO) 500 MG TABLET    Take 1 tablet (500 mg) by mouth 2 times daily for 7 days       Final diagnoses:   Acute cystitis without hematuria       10/14/2020   [facility] EMERGENCY DEPT     Nahum Jj MD  10/14/20 2754

## 2020-10-14 NOTE — DISCHARGE INSTRUCTIONS
Start Cipro tomorrow and take it twice daily for 7 days.  If your urine culture shows that it is resistant to Cipro we will contact you.  Meantime push fluids to maintain hydration.  Tylenol or ibuprofen for fever or pain.

## 2020-10-14 NOTE — ED TRIAGE NOTES
"Pt presents with uti symptoms for a couple weeks. Pt states she developed symptoms then they seemed to get better. Pt states they have returned and she usually is not symptomatic with them and has gone into \"shock\". Patient's airway, breathing, circulation, and disability/mental status (ABCDs) intact/WDL during triage.    "

## 2020-10-14 NOTE — ED AVS SNAPSHOT
[facility] Emergency Dept  [address] [place] [address]  Phone: [phone]  Fax: [phone]                                    Shelley Lew   MRN: [id number]    Department: [facility] Emergency Dept   Date of Visit: 10/14/2020           After Visit Summary Signature Page    I have received my discharge instructions, and my questions have been answered. I have discussed any challenges I see with this plan with the nurse or doctor.    ..........................................................................................................................................  Patient/Patient Representative Signature      ..........................................................................................................................................  Patient Representative Print Name and Relationship to Patient    ..................................................               ................................................  Date                                   Time    ..........................................................................................................................................  Reviewed by Signature/Title    ...................................................              ..............................................  Date                                               Time          22EPIC Rev 08/18

## 2020-10-15 NOTE — RESULT ENCOUNTER NOTE
Emergency Dept/Urgent Care discharge antibiotic (if prescribed): Ciprofloxacin (Cipro) 500 mg tablet, 1 tablet (500 mg) by mouth 2 times daily for 7 days.  Date of Rx (if applicable):  10/14/20  No changes in treatment per Urine culture protocol.

## 2020-10-16 LAB
BACTERIA SPEC CULT: ABNORMAL
Lab: ABNORMAL
SPECIMEN SOURCE: ABNORMAL

## 2020-10-16 NOTE — RESULT ENCOUNTER NOTE
Final Urine Culture Report on 10/16/20  Emergency Dept/Urgent Care discharge antibiotic prescribed: Ciprofloxacin (Cipro) 500 mg tablet, 1 tablet (500 mg) by mouth 2 times daily for 7 days.  #1. Bacteria, 10,000 to 50,000 colonies/ml Escherichia coli, is SUSCEPTIBLE to Antibiotic.    As per [facility] ED Lab Result protocol, no change in antibiotic therapy.

## 2020-10-19 ENCOUNTER — TELEPHONE (OUTPATIENT)
Dept: EMERGENCY MEDICINE | Facility: CLINIC | Age: 55
End: 2020-10-19

## 2020-10-19 NOTE — TELEPHONE ENCOUNTER
[facility] Emergency Department/Urgent Care Lab result notification [Positive for uti and bacteria is susceptible to antibiotic]:    Reason for call:   Notify of Final urine culture result, confirm patient is taking antibiotic, assess symptoms, and advise per Emergency Dept/Urgent Care discharge instructions and Emergency Dept urine culture protocol.    Lab Result & Date of Final Report [copied from Result Note]:    Final Urine Culture Report on 10/16/20  Emergency Dept/Urgent Care discharge antibiotic prescribed: Ciprofloxacin (Cipro) 500 mg tablet, 1 tablet (500 mg) by mouth 2 times daily for 7 days.  #1. Bacteria, 10,000 to 50,000 colonies/ml Escherichia coli, is SUSCEPTIBLE to Antibiotic.    As per [place] ED Lab Result protocol, no change in antibiotic therapy.    Current symptoms (include time patient called):    2:21PM: Patient returned call. States that she is still not feeling any better. Not feeling worse, but not better.     Recommendations/Instructions:   Patient notified of lab result and treatment recommendation.   Take antibiotic as directed by the Emergency Dept/Urgent Care Provider.  Advised per ED lab urine culture protocol  to contact her PCP today to discuss her symptoms and determine the plan of care.   Advised to return to ED if symptoms worsen.  The patient is comfortable with the information given and has no further questions.     UTI prevention/education reviewed with patient [Yes/No]:  No    Please contact you PCP or return to the Emergency Department if your:    Symptoms worsen or other concerning symptoms    Krystin Do RN  [facility] RN  Lung Nodule and ED Lab Result RN  Epic pool (ED late result f/u RN): [id number]  [facility] INCIDENTAL RADIOLOGY F/U NURSES: P [id number]  # [phone]

## 2020-11-11 ENCOUNTER — MYC REFILL (OUTPATIENT)
Dept: FAMILY MEDICINE | Facility: CLINIC | Age: 55
End: 2020-11-11

## 2020-11-11 DIAGNOSIS — R11.0 NAUSEA: ICD-10-CM

## 2020-11-11 DIAGNOSIS — R09.81 NASAL CONGESTION: ICD-10-CM

## 2020-11-12 RX ORDER — CETIRIZINE HYDROCHLORIDE 10 MG/1
10 TABLET ORAL DAILY
Qty: 30 TABLET | Refills: 3 | Status: SHIPPED | OUTPATIENT
Start: 2020-11-12 | End: 2021-01-25

## 2020-11-12 RX ORDER — CETIRIZINE HYDROCHLORIDE 10 MG/1
10 TABLET ORAL DAILY
Qty: 30 TABLET | Refills: 3 | Status: SHIPPED | OUTPATIENT
Start: 2020-11-12 | End: 2020-11-12

## 2020-11-12 RX ORDER — FLUTICASONE PROPIONATE 50 MCG
2 SPRAY, SUSPENSION (ML) NASAL DAILY
Qty: 9.9 ML | Refills: 3 | Status: SHIPPED | OUTPATIENT
Start: 2020-11-12 | End: 2020-11-12

## 2020-11-12 RX ORDER — ONDANSETRON 4 MG/1
4 TABLET, ORALLY DISINTEGRATING ORAL EVERY 6 HOURS PRN
Qty: 15 TABLET | Refills: 3 | Status: SHIPPED | OUTPATIENT
Start: 2020-11-12 | End: 2021-01-25

## 2020-11-12 RX ORDER — FLUTICASONE PROPIONATE 50 MCG
2 SPRAY, SUSPENSION (ML) NASAL DAILY
Qty: 9.9 ML | Refills: 3 | Status: SHIPPED | OUTPATIENT
Start: 2020-11-12 | End: 2021-01-25

## 2020-12-09 ENCOUNTER — MYC MEDICAL ADVICE (OUTPATIENT)
Dept: FAMILY MEDICINE | Facility: CLINIC | Age: 55
End: 2020-12-09

## 2020-12-09 DIAGNOSIS — Z91.89 ENCOUNTER FOR HCV SCREENING TEST FOR HIGH RISK PATIENT: Primary | ICD-10-CM

## 2020-12-09 DIAGNOSIS — Z11.59 ENCOUNTER FOR HCV SCREENING TEST FOR HIGH RISK PATIENT: Primary | ICD-10-CM

## 2020-12-13 ENCOUNTER — HEALTH MAINTENANCE LETTER (OUTPATIENT)
Age: 55
End: 2020-12-13

## 2021-01-07 DIAGNOSIS — Z91.89 ENCOUNTER FOR HCV SCREENING TEST FOR HIGH RISK PATIENT: ICD-10-CM

## 2021-01-07 DIAGNOSIS — Z11.59 ENCOUNTER FOR HCV SCREENING TEST FOR HIGH RISK PATIENT: ICD-10-CM

## 2021-01-07 PROCEDURE — 87522 HEPATITIS C REVRS TRNSCRPJ: CPT | Mod: 90 | Performed by: PATHOLOGY

## 2021-01-07 PROCEDURE — 36415 COLL VENOUS BLD VENIPUNCTURE: CPT | Performed by: PATHOLOGY

## 2021-01-07 PROCEDURE — 86803 HEPATITIS C AB TEST: CPT | Mod: 90 | Performed by: PATHOLOGY

## 2021-01-08 ENCOUNTER — MYC MEDICAL ADVICE (OUTPATIENT)
Dept: GASTROENTEROLOGY | Facility: CLINIC | Age: 56
End: 2021-01-08

## 2021-01-08 DIAGNOSIS — B18.2 CHRONIC HEPATITIS C WITHOUT HEPATIC COMA (H): Primary | ICD-10-CM

## 2021-01-08 LAB — HCV AB SERPL QL IA: REACTIVE

## 2021-01-11 ENCOUNTER — DOCUMENTATION ONLY (OUTPATIENT)
Dept: CARE COORDINATION | Facility: CLINIC | Age: 56
End: 2021-01-11

## 2021-01-11 LAB
HCV RNA SERPL NAA+PROBE-ACNC: NORMAL [IU]/ML
HCV RNA SERPL NAA+PROBE-LOG IU: NORMAL LOG IU/ML

## 2021-01-12 ENCOUNTER — VIRTUAL VISIT (OUTPATIENT)
Dept: NEPHROLOGY | Facility: CLINIC | Age: 56
End: 2021-01-12
Payer: COMMERCIAL

## 2021-01-12 DIAGNOSIS — B18.2 CHRONIC HEPATITIS C WITHOUT HEPATIC COMA (H): Primary | ICD-10-CM

## 2021-01-12 NOTE — PROGRESS NOTES
Called patient , no answer, left message , I will try to call patient again in 10/ 15 mins.  Called patient several  times no answer.Felicitas Perez, CMA     Patient did not answer for visit  No recent labs, h/o renal dz or HTN  Visit cancelled    Taylor Chambers CNP

## 2021-01-12 NOTE — LETTER
1/12/2021       RE: Shelley Lew  [address] [address]     Dear Colleague,    Thank you for referring your patient, Shelley Lew, to the [facility] [place] [facility]. Please see a copy of my visit note below.    Called patient , no answer, left message , I will try to call patient again in 10/ 15 mins.  Called patient several  times no answer.Felicitas Perez CMA     Patient did not answer for visit  No recent labs, h/o renal dz or HTN  Visit cancelled    Taylor Chambers CNP        Again, thank you for allowing me to participate in the care of your patient.      Sincerely,    Taylor Chambers, NP

## 2021-01-15 ENCOUNTER — TELEPHONE (OUTPATIENT)
Dept: FAMILY MEDICINE | Facility: CLINIC | Age: 56
End: 2021-01-15

## 2021-01-15 NOTE — TELEPHONE ENCOUNTER
Hi! Please place future laboratory orders if needed for upcoming appointment on 1/20/21. If labs are not due, please have your care team contact the patient to cancel lab only appointment.     Thank you!  [facility] lab

## 2021-01-20 ENCOUNTER — VIRTUAL VISIT (OUTPATIENT)
Dept: URGENT CARE | Facility: CLINIC | Age: 56
End: 2021-01-20

## 2021-01-20 DIAGNOSIS — Z20.822 EXPOSURE TO COVID-19 VIRUS: ICD-10-CM

## 2021-01-20 DIAGNOSIS — Z20.822 SUSPECTED COVID-19 VIRUS INFECTION: Primary | ICD-10-CM

## 2021-01-20 PROCEDURE — 99213 OFFICE O/P EST LOW 20 MIN: CPT | Mod: TEL | Performed by: NURSE PRACTITIONER

## 2021-01-20 NOTE — PROGRESS NOTES
Shelley Lew is a 55 year old who is being evaluated via a billable telephone visit.        ICD-10-CM    1. Suspected COVID-19 virus infection  Z20.822 Symptomatic COVID-19 Virus (Coronavirus) by PCR   2. Exposure to COVID-19 virus  Z20.822 Symptomatic COVID-19 Virus (Coronavirus) by PCR   Advised to go to the ER if shortness of breath worsens. OTC medications for symptoms.    Subjective     Shelley Lew reports dizziness, confusion, nauseated, shortness of breath, eyes swollen, slight ST, rash underneath breasts and on ankles, and diarrhea. symptoms started a few days ago. Sister tested positive and she was with her today.She has taken some Ibuprofen for HA.    Objective       Vitals:  No vitals were obtained today due to virtual visit.    Physical Exam   Alert, no acute distress  PSYCH: Alert and oriented times 3 normal affect  RESP: No cough, no audible wheezing, able to talk in full sentences    Remainder of exam unable to be completed due to telephone visits    Call time: 13 minutes    ROBERTA Johnson, CNP  [facility]

## 2021-01-20 NOTE — PATIENT INSTRUCTIONS
Go to the ER if shortness of breath worsens.    Take over the counter cold medications to help with your symptoms.    Drink plenty of fluids and rest.

## 2021-01-25 ENCOUNTER — HOSPITAL ENCOUNTER (EMERGENCY)
Facility: CLINIC | Age: 56
Discharge: HOME | End: 2021-01-25
Attending: EMERGENCY MEDICINE | Admitting: EMERGENCY MEDICINE
Payer: COMMERCIAL

## 2021-01-25 VITALS
SYSTOLIC BLOOD PRESSURE: 124 MMHG | TEMPERATURE: 98.3 F | OXYGEN SATURATION: 100 % | WEIGHT: 160 LBS | DIASTOLIC BLOOD PRESSURE: 81 MMHG | HEART RATE: 72 BPM | BODY MASS INDEX: 27.46 KG/M2 | RESPIRATION RATE: 20 BRPM

## 2021-01-25 DIAGNOSIS — R11.0 NAUSEA: ICD-10-CM

## 2021-01-25 DIAGNOSIS — U07.1 COVID-19: ICD-10-CM

## 2021-01-25 LAB
ALBUMIN SERPL-MCNC: 3.7 G/DL (ref 3.4–5)
ALP SERPL-CCNC: 100 U/L (ref 40–150)
ALT SERPL W P-5'-P-CCNC: 28 U/L (ref 0–50)
ANION GAP SERPL CALCULATED.3IONS-SCNC: 4 MMOL/L (ref 3–14)
AST SERPL W P-5'-P-CCNC: 18 U/L (ref 0–45)
BASE EXCESS BLDV CALC-SCNC: 4.1 MMOL/L
BASOPHILS # BLD AUTO: 0 10E9/L (ref 0–0.2)
BASOPHILS NFR BLD AUTO: 0.3 %
BILIRUB SERPL-MCNC: 0.4 MG/DL (ref 0.2–1.3)
BUN SERPL-MCNC: 13 MG/DL (ref 7–30)
CALCIUM SERPL-MCNC: 9 MG/DL (ref 8.5–10.1)
CHLORIDE SERPL-SCNC: 107 MMOL/L (ref 94–109)
CO2 SERPL-SCNC: 31 MMOL/L (ref 20–32)
CREAT SERPL-MCNC: 0.61 MG/DL (ref 0.52–1.04)
DIFFERENTIAL METHOD BLD: ABNORMAL
EOSINOPHIL NFR BLD AUTO: 1.1 %
ERYTHROCYTE [DISTWIDTH] IN BLOOD BY AUTOMATED COUNT: 12.6 % (ref 10–15)
FLUAV RNA RESP QL NAA+PROBE: NEGATIVE
FLUBV RNA RESP QL NAA+PROBE: NEGATIVE
GFR SERPL CREATININE-BSD FRML MDRD: >90 ML/MIN/{1.73_M2}
GLUCOSE SERPL-MCNC: 91 MG/DL (ref 70–99)
HCO3 BLDV-SCNC: 30 MMOL/L (ref 21–28)
HCT VFR BLD AUTO: 42.7 % (ref 35–47)
HGB BLD-MCNC: 14.7 G/DL (ref 11.7–15.7)
IMM GRANULOCYTES # BLD: 0 10E9/L (ref 0–0.4)
IMM GRANULOCYTES NFR BLD: 0.3 %
LABORATORY COMMENT REPORT: ABNORMAL
LYMPHOCYTES # BLD AUTO: 1.7 10E9/L (ref 0.8–5.3)
LYMPHOCYTES NFR BLD AUTO: 46.5 %
MCH RBC QN AUTO: 31.7 PG (ref 26.5–33)
MCHC RBC AUTO-ENTMCNC: 34.4 G/DL (ref 31.5–36.5)
MCV RBC AUTO: 92 FL (ref 78–100)
MONOCYTES # BLD AUTO: 0.4 10E9/L (ref 0–1.3)
MONOCYTES NFR BLD AUTO: 12.4 %
NEUTROPHILS # BLD AUTO: 1.4 10E9/L (ref 1.6–8.3)
NEUTROPHILS NFR BLD AUTO: 39.4 %
NRBC # BLD AUTO: 0 10*3/UL
NRBC BLD AUTO-RTO: 0 /100
O2/TOTAL GAS SETTING VFR VENT: 21 %
PCO2 BLDV: 51 MM HG (ref 40–50)
PH BLDV: 7.39 PH (ref 7.32–7.43)
PLATELET # BLD AUTO: 214 10E9/L (ref 150–450)
PO2 BLDV: 32 MM HG (ref 25–47)
POTASSIUM SERPL-SCNC: 3.9 MMOL/L (ref 3.4–5.3)
PROT SERPL-MCNC: 7.4 G/DL (ref 6.8–8.8)
RBC # BLD AUTO: 4.63 10E12/L (ref 3.8–5.2)
RSV RNA SPEC QL NAA+PROBE: ABNORMAL
SARS-COV-2 RNA RESP QL NAA+PROBE: POSITIVE
SODIUM SERPL-SCNC: 142 MMOL/L (ref 133–144)
SPECIMEN SOURCE: ABNORMAL
WBC # BLD AUTO: 3.6 10E9/L (ref 4–11)

## 2021-01-25 PROCEDURE — 80053 COMPREHEN METABOLIC PANEL: CPT | Performed by: EMERGENCY MEDICINE

## 2021-01-25 PROCEDURE — C9803 HOPD COVID-19 SPEC COLLECT: HCPCS | Performed by: EMERGENCY MEDICINE

## 2021-01-25 PROCEDURE — 258N000003 HC RX IP 258 OP 636: Performed by: EMERGENCY MEDICINE

## 2021-01-25 PROCEDURE — 250N000011 HC RX IP 250 OP 636: Performed by: EMERGENCY MEDICINE

## 2021-01-25 PROCEDURE — 96361 HYDRATE IV INFUSION ADD-ON: CPT | Performed by: EMERGENCY MEDICINE

## 2021-01-25 PROCEDURE — 85025 COMPLETE CBC W/AUTO DIFF WBC: CPT | Performed by: EMERGENCY MEDICINE

## 2021-01-25 PROCEDURE — 96375 TX/PRO/DX INJ NEW DRUG ADDON: CPT | Performed by: EMERGENCY MEDICINE

## 2021-01-25 PROCEDURE — 82803 BLOOD GASES ANY COMBINATION: CPT | Performed by: EMERGENCY MEDICINE

## 2021-01-25 PROCEDURE — 99284 EMERGENCY DEPT VISIT MOD MDM: CPT | Mod: 25 | Performed by: EMERGENCY MEDICINE

## 2021-01-25 PROCEDURE — 99284 EMERGENCY DEPT VISIT MOD MDM: CPT | Performed by: EMERGENCY MEDICINE

## 2021-01-25 PROCEDURE — 87636 SARSCOV2 & INF A&B AMP PRB: CPT | Performed by: EMERGENCY MEDICINE

## 2021-01-25 PROCEDURE — 96374 THER/PROPH/DIAG INJ IV PUSH: CPT | Performed by: EMERGENCY MEDICINE

## 2021-01-25 RX ORDER — ONDANSETRON 4 MG/1
4 TABLET, ORALLY DISINTEGRATING ORAL EVERY 6 HOURS PRN
Qty: 20 TABLET | Refills: 0 | Status: SHIPPED | OUTPATIENT
Start: 2021-01-25 | End: 2022-02-15

## 2021-01-25 RX ORDER — ONDANSETRON 2 MG/ML
4 INJECTION INTRAMUSCULAR; INTRAVENOUS EVERY 30 MIN PRN
Status: DISCONTINUED | OUTPATIENT
Start: 2021-01-25 | End: 2021-01-25 | Stop reason: HOSPADM

## 2021-01-25 RX ORDER — IBUPROFEN 200 MG
600 TABLET ORAL 2 TIMES DAILY PRN
COMMUNITY
End: 2022-02-15

## 2021-01-25 RX ORDER — KETOROLAC TROMETHAMINE 30 MG/ML
30 INJECTION, SOLUTION INTRAMUSCULAR; INTRAVENOUS ONCE
Status: COMPLETED | OUTPATIENT
Start: 2021-01-25 | End: 2021-01-25

## 2021-01-25 RX ADMIN — SODIUM CHLORIDE 500 ML: 9 INJECTION, SOLUTION INTRAVENOUS at 18:38

## 2021-01-25 RX ADMIN — KETOROLAC TROMETHAMINE 30 MG: 30 INJECTION, SOLUTION INTRAMUSCULAR at 18:37

## 2021-01-25 RX ADMIN — ONDANSETRON 4 MG: 2 INJECTION INTRAMUSCULAR; INTRAVENOUS at 18:37

## 2021-01-26 DIAGNOSIS — U07.1 2019 NOVEL CORONAVIRUS DISEASE (COVID-19): Primary | ICD-10-CM

## 2021-01-26 NOTE — DISCHARGE INSTRUCTIONS
"Be sure to drink plenty of fluids and get lots of rest.  Zofran if needed for nausea.  You can take 1 to 2 tablets if needed.  Take Tylenol or ibuprofen if needed for pain and headaches.  I put in an order for the \"get well loop\" so that you can have follow-up.  If you have any worsening, changes or concerns please return promptly at any time.  I hope you start to feel much better quickly!!!!    Discharge Instructions for COVID-19 Patients  You have COVID-19. Please follow the instructions listed below.   If you have a weakened immune system, discuss with your doctor any other actions you need to take.  How can I protect others?  If you have symptoms (fever, cough, body aches or trouble breathing):  Stay home and away from others (self-isolate) until:  Your other symptoms have resolved (gotten better). And   You've had no fever--and no medicine that reduces fever--for 1 full day (24 hours). And   At least 10 days have passed since your symptoms started. (You may need to wait 20 days. Follow the advice of your care team.)  If you don't show symptoms, but testing showed that you have COVID-19:  Stay home and away from others (self-isolate) until at least 10 days have passed since the date of your first positive COVID-19 test.  During this time  Stay in your own room, even for meals. Use your own bathroom if you can.  Stay away from others in your home. No hugging, kissing or shaking hands. No visitors.  Don't go to work, school or anywhere else.  Clean \"high touch\" surfaces often (doorknobs, counters, handles). Use household cleaning spray or wipes.  You'll find a full list of  on the EPA website: www.epa.gov/pesticide-registration/list-n-disinfectants-use-against-sars-cov-2.  Cover your mouth and nose with a mask or other face covering to avoid spreading germs.  Wash your hands and face often. Use soap and water.  Caregivers in these groups are at risk for severe illness due to COVID-19:  People 65 years and " older  People who live in a nursing home or long-term care facility  People with chronic disease (lung, heart, cancer, diabetes, kidney, liver, immunologic)  People who have a weakened immune system, including those who:  Are in cancer treatment  Take medicine that weakens the immune system, such as corticosteroids  Had a bone marrow or organ transplant  Have an immune deficiency  Have poorly controlled HIV or AIDS  Are obese (body mass index of 40 or higher)  Smoke regularly  Caregivers should wear gloves while washing dishes, handling laundry and cleaning bedrooms and bathrooms.  Use caution when washing and drying laundry: Don't shake dirty laundry and use the warmest water setting that you can.  For more tips on managing your health at home, go to www.cdc.gov/coronavirus/2019-ncov/downloads/10Things.pdf.  How can I take care of myself at home?  Get lots of rest. Drink extra fluids (unless a doctor has told you not to).  Take Tylenol (acetaminophen) for fever or pain. If you have liver or kidney problems, ask your family doctor if it's okay to take Tylenol.   Adults can take either:   650 mg (two 325 mg pills) every 4 to 6 hours, or   1,000 mg (two 500 mg pills) every 8 hours as needed.  Note: Don't take more than 3,000 mg in one day. Acetaminophen is found in many medicines (both prescribed and over-the-counter medicines). Read all labels to be sure you don't take too much.   For children, check the Tylenol bottle for the right dose. The dose is based on the child's age or weight.  If you have other health problems (like cancer, heart failure, an organ transplant or severe kidney disease): Call your specialty clinic if you don't feel better in the next 2 days.  Know when to call 911. Emergency warning signs include:  Trouble breathing or shortness of breath  Pain or pressure in the chest that doesn't go away  Feeling confused like you haven't felt before, or not being able to wake up  Bluish-colored lips or  face  Your doctor may have prescribed a blood thinner medicine. Follow their instructions.  Where can I get more information?  [facility] - About COVID-19: [email or web address]  CDC - What to Do If You're Sick: www.cdc.gov/coronavirus/2019-ncov/about/steps-when-sick.html  CDC - Ending Home Isolation: www.cdc.gov/coronavirus/2019-ncov/hcp/disposition-in-home-patients.html  [facility] - Caring for Someone: www.cdc.gov/coronavirus/2019-ncov/if-you-are-sick/care-for-someone.html  [facility] - Interim Guidance for Hospital Discharge to Home: www.health.[facility].[place]/diseases/coronavirus/hcp/hospdischarge.pdf  Below are the COVID-19 hotlines at the [place] Department of Health ([facility]). Interpreters are available.  For health questions: Call 558-559-7028 or 1-551.525.5122 (7 a.m. to 7 p.m.)  For questions about schools and childcare: Call 098-074-6719 or 1-490.141.9487 (7 a.m. to 7 p.m.)    For informational purposes only. Not to replace the advice of your health care provider. Clinically reviewed by Dr. Terence Low.   Copyright   2020 [facility]. All rights reserved. [employer] [id number] - REV 01/05/21.

## 2021-01-26 NOTE — ED PROVIDER NOTES
History     Chief Complaint   Patient presents with     Cough     Fever     HPI  History per patient and medical records    This is a 55-year-old female, history of chronic hep C, hyperlipidemia, tobacco use presenting with cough and fever.  Patient started having symptoms 4 days ago.  She has had headache, body aches, cough, dizziness, dyspnea on exertion/shortness of breath, nausea.  The nausea seems to be what is bothering her the most.  She has had decreased oral intake and slight diarrhea.  She has not had fever but has had significant chills.  She denies history of any lung disease.  She is not diabetic.  She smokes about 1 pack/week.  Her sister was just diagnosed with COVID-19.  No loss of taste or smell.    Allergies:  Allergies   Allergen Reactions     Percocet [Oxycodone-Acetaminophen] Nausea and Vomiting     Nausea vomiting and head swimming     Vicodin [Hydrocodone-Acetaminophen] Nausea and Vomiting       Problem List:    Patient Active Problem List    Diagnosis Date Noted     Chronic hepatitis C without hepatic coma (H) 10/09/2019     Priority: Medium     Treated.  Sustained response.  Felt to be cured        Hyperlipidemia LDL goal <130 07/06/2017     Priority: Medium     H/O drug abuse (H) 07/06/2017     Priority: Medium     Intermittent meth use . Sober as of 7/20       Spinal stenosis of lumbar region with neurogenic claudication 02/16/2017     Priority: Medium     Spine surg referral.  Many MRI findings.  Severely limited by this.    Intermittent meth use has been an issue for her, would be careful with opioids.         Tobacco use disorder 02/16/2017     Priority: Medium     Health Care Home 03/31/2016     Priority: Medium       Status:  Unable to reach  Care Coordinator:  Nabila Coffman    See Letters for H Care Plan  Date:  March 31, 2016           CTS (carpal tunnel syndrome) 05/15/2013     Priority: Medium     CARDIOVASCULAR SCREENING; LDL GOAL LESS THAN 160 10/31/2010     Priority:  Medium        Past Medical History:    Past Medical History:   Diagnosis Date      (normal spontaneous vaginal delivery)      PONV (postoperative nausea and vomiting)      Pyelonephritis, acute        Past Surgical History:    Past Surgical History:   Procedure Laterality Date     CHOLECYSTECTOMY       COLONOSCOPY N/A 2020    Procedure: COLONOSCOPY;  Surgeon: Shawn Amor MD;  Location: [facility]     LAMINECTOMY LUMBAR POSTERIOR MICROSCOPIC TWO LEVELS N/A 2017    Procedure: LAMINECTOMY LUMBAR POSTERIOR MICROSCOPIC TWO LEVELS;  Surgeon: Nehemias Bangura MD;  Location: [facility]     LAPAROSCOPY,TUBAL CAUTERY       RELEASE CARPAL TUNNEL  2013    Procedure: RELEASE CARPAL TUNNEL;  Left carpal tunnel release  ;  Surgeon: Yovani Gonzalez MD;  Location: [facility]     RELEASE CARPAL TUNNEL Right 10/15/2019    Procedure: Open Carpal Tunnel Release;  Surgeon: Yovani Gonzalez MD;  Location: [facility]       Family History:    Family History   Problem Relation Age of Onset     Cancer Sister         cervical     Cancer Sister         cervical       Social History:  Marital Status:  Single [1]  Social History     Tobacco Use     Smoking status: Light Tobacco Smoker     Types: Cigarettes     Smokeless tobacco: Never Used     Tobacco comment: one pack lasts a week   Substance Use Topics     Alcohol use: Not Currently     Comment: OCC., few sips of wine 9/3/19     Drug use: Yes     Types: Methamphetamines     Comment: smokes, not IV        Medications:         ibuprofen (ADVIL/MOTRIN) 200 MG tablet       ondansetron (ZOFRAN ODT) 4 MG ODT tab          Review of Systems   All other ROS reviewed and are negative or non-contributory except as stated in HPI.     Physical Exam   BP: (!) 122/90  Pulse: 79  Temp: 98.3  F (36.8  C)  Resp: 16  Weight: 72.6 kg (160 lb)  SpO2: 100 %      Physical Exam  Vitals signs and nursing note reviewed.   Constitutional:       Appearance: She is normal weight.      Comments: Uncomfortable  appearing female sitting in the bed   HENT:      Head: Normocephalic and atraumatic.      Right Ear: Tympanic membrane and ear canal normal.      Left Ear: Tympanic membrane and ear canal normal.      Nose: Nose normal.      Mouth/Throat:      Pharynx: Oropharynx is clear.      Comments: Slightly tacky mucous membranes  Eyes:      Extraocular Movements: Extraocular movements intact.      Conjunctiva/sclera: Conjunctivae normal.   Neck:      Musculoskeletal: Normal range of motion and neck supple.   Cardiovascular:      Rate and Rhythm: Normal rate and regular rhythm.      Pulses: Normal pulses.      Heart sounds: Normal heart sounds.   Pulmonary:      Effort: Pulmonary effort is normal.      Breath sounds: Normal breath sounds.   Abdominal:      Palpations: Abdomen is soft.      Tenderness: There is no abdominal tenderness.   Musculoskeletal: Normal range of motion.         General: No tenderness.      Right lower leg: No edema.      Left lower leg: No edema.   Skin:     General: Skin is warm and dry.   Neurological:      General: No focal deficit present.      Mental Status: She is alert and oriented to person, place, and time.   Psychiatric:         Behavior: Behavior normal.      Comments: Appropriately mildly anxious         ED Course (with Medical Decision Making)    Pt seen and examined by me.  RN and EPIC notes reviewed.      Patient with viral-like symptoms with positive Covid exposure.  Plan IV, fluids, labs, Covid, Toradol and a small fluid bolus.  Zofran for nausea.    Patient has white count of 3.6 and a positive Covid.  VBG is unremarkable.    Her sats on room air are 100% and her lungs are clear.  I recommend that she drink plenty of fluids and rest.  She was given Zofran as needed for nausea and set up with the get well loop.  Continue to monitor symptoms and follow-up in clinic as needed, return promptly at anytime for worsening, changes or concerns.          Procedures    Results for orders placed  or performed during the hospital encounter of 01/25/21 (from the past 24 hour(s))   CBC with platelets differential   Result Value Ref Range    WBC 3.6 (L) 4.0 - 11.0 10e9/L    RBC Count 4.63 3.8 - 5.2 10e12/L    Hemoglobin 14.7 11.7 - 15.7 g/dL    Hematocrit 42.7 35.0 - 47.0 %    MCV 92 78 - 100 fl    MCH 31.7 26.5 - 33.0 pg    MCHC 34.4 31.5 - 36.5 g/dL    RDW 12.6 10.0 - 15.0 %    Platelet Count 214 150 - 450 10e9/L    Diff Method Automated Method     % Neutrophils 39.4 %    % Lymphocytes 46.5 %    % Monocytes 12.4 %    % Eosinophils 1.1 %    % Basophils 0.3 %    % Immature Granulocytes 0.3 %    Nucleated RBCs 0 0 /100    Absolute Neutrophil 1.4 (L) 1.6 - 8.3 10e9/L    Absolute Lymphocytes 1.7 0.8 - 5.3 10e9/L    Absolute Monocytes 0.4 0.0 - 1.3 10e9/L    Absolute Basophils 0.0 0.0 - 0.2 10e9/L    Abs Immature Granulocytes 0.0 0 - 0.4 10e9/L    Absolute Nucleated RBC 0.0    Comprehensive metabolic panel   Result Value Ref Range    Sodium 142 133 - 144 mmol/L    Potassium 3.9 3.4 - 5.3 mmol/L    Chloride 107 94 - 109 mmol/L    Carbon Dioxide 31 20 - 32 mmol/L    Anion Gap 4 3 - 14 mmol/L    Glucose 91 70 - 99 mg/dL    Urea Nitrogen 13 7 - 30 mg/dL    Creatinine 0.61 0.52 - 1.04 mg/dL    GFR Estimate >90 >60 mL/min/[1.73_m2]    GFR Estimate If Black >90 >60 mL/min/[1.73_m2]    Calcium 9.0 8.5 - 10.1 mg/dL    Bilirubin Total 0.4 0.2 - 1.3 mg/dL    Albumin 3.7 3.4 - 5.0 g/dL    Protein Total 7.4 6.8 - 8.8 g/dL    Alkaline Phosphatase 100 40 - 150 U/L    ALT 28 0 - 50 U/L    AST 18 0 - 45 U/L   Blood gas venous   Result Value Ref Range    Ph Venous 7.39 7.32 - 7.43 pH    PCO2 Venous 51 (H) 40 - 50 mm Hg    PO2 Venous 32 25 - 47 mm Hg    Bicarbonate Venous 30 (H) 21 - 28 mmol/L    Base Excess Venous 4.1 mmol/L    FIO2 21    Symptomatic Influenza A/B & SARS-CoV2 (COVID-19) Virus PCR Multiplex    Specimen: Nasopharyngeal   Result Value Ref Range    Flu A/B & SARS-COV-2 PCR Source Nasopharyngeal     SARS-CoV-2 PCR Result  POSITIVE (AA)     Influenza A PCR Negative NEG^Negative    Influenza B PCR Negative NEG^Negative    Respiratory Syncytial Virus PCR (Note)     Flu A/B & SARS-CoV-2 PCR Comment (Note)        Medications   0.9% sodium chloride BOLUS (0 mLs Intravenous Stopped 1/25/21 1930)   ketorolac (TORADOL) injection 30 mg (30 mg Intravenous Given 1/25/21 1837)       Assessments & Plan      I have reviewed the findings, diagnosis, plan and need for follow up with the patient.    Discharge Medication List as of 1/25/2021  7:31 PM          Final diagnoses:   COVID-19   Nausea     Disposition: Patient discharged home in stable condition.  Plan as above.  Return for concerns.     Note: Chart documentation done in part with Dragon Voice Recognition software. Although reviewed after completion, some word and grammatical errors may remain.       1/25/2021   [facility] EMERGENCY DEPT     Ailyn Simms MD  01/26/21 0208

## 2021-01-26 NOTE — ED TRIAGE NOTES
Pt here with cough, chills, headache,  aches, nausea, diarrhea, started about 4 days ago, sister has covid

## 2021-01-27 ENCOUNTER — PATIENT OUTREACH (OUTPATIENT)
Dept: CARE COORDINATION | Facility: CLINIC | Age: 56
End: 2021-01-27

## 2021-01-27 ASSESSMENT — ACTIVITIES OF DAILY LIVING (ADL): DEPENDENT_IADLS:: INDEPENDENT

## 2021-01-27 NOTE — PROGRESS NOTES
Clinic Care Coordination Contact  [facility]/Voicemail    Referral Source: ED Follow-Up    Clinical Data: Care Coordinator Outreach    Outreach attempted x 2. Unable to leave message as mailbox is full.    Patient was seen in ED on 1/26/21 with nausea and found to be covid-19 positive. Given zofran and fluids and discharged to home.     Plan: Care Coordinator will send care coordination introduction letter with care coordinator contact information and explanation of care coordination services and covid-19 instructions via [employer].    Get Well Loop referral was sent in ED.     Care Coordinator will do no further outreaches at this time.    Neetu Guillory RN, [facility] - Primary Care Clinic RN Coordinator  [facility]   1/27/2021    9:55 AM  [phone]

## 2021-01-27 NOTE — LETTER
[place] CARE COORDINATION  [address] [facility] [address]    January 27, 2021    Shelley Lew  [address] [facility] [address]      Dear Shelley,    I am a clinic care coordinator who works with Seth Pollard MD at [facility]. I recently tried to call and was unable to reach you. Below is a description of clinic care coordination and how I can further assist you.      The clinic care coordination team is made up of a registered nurse,  and community health worker who understand the health care system. The goal of clinic care coordination is to help you manage your health and improve access to the health care system in the most efficient manner. The team can assist you in meeting your health care goals by providing education, coordinating services, strengthening the communication among your providers and supporting you with any resource needs.    Please feel free to contact me at [phone] with any questions or concerns. We are focused on providing you with the highest-quality healthcare experience possible and that all starts with you.     Sincerely,     Neetu Guillory RN, [facility] - Primary Care Clinic RN Coordinator  [facility]  [phone]    Instructions for Patients  Your symptoms show that you may have coronavirus (COVID-19). This illness can cause fever, cough and trouble breathing. Many people get a mild case and get better on their own. Some people can get very sick.     Not all patients are tested for COVID-19. If you need to be tested, your care team will let you know.    How can I protect others?    Without a test, we can t know for sure that you have COVID-19. For safety, it s very important to follow these rules.    Stay home and away from others (self-isolate) until:    You ve had no fever--and no medicine that reduces fever--for 1 full day (24 hours), And     Your other symptoms have resolved (gotten better). For  example, your cough or breathing has improved, And     At least 10 days have passed since your symptoms started.    During this time:    Stay in your own room (and use your own bathroom), if you can.    Stay away from others in your home. No hugging, kissing or shaking hands.    No visitors.    Don t go to work, school or anywhere else.     Clean  high touch  surfaces often (doorknobs, counters, handles, etc.). Use a household cleaning spray or wipes.    Cover your mouth and nose with a mask, tissue or washcloth to avoid spreading germs.    Wash your hands and face often. Use soap and water.    For more tips, go to https://www.cdc.gov/coronavirus/2019-ncov/downloads/10Things.pdf.    How can I take care of myself?    1. Get lots of rest. Drink extra fluids (unless a doctor has told you not to).     2. Take Tylenol (acetaminophen) for fever or pain. If you have liver or kidney problems, ask your family doctor if it's okay to take Tylenol.     Adults can take either:     650 mg (two 325 mg pills) every 4 to 6 hours, or     1,000 mg (two 500 mg pills) every 8 hours as needed.     Note: Don't take more than 3,000 mg in one day.   Acetaminophen is found in many medicines (both prescribed and over-the-counter medicines). Read all labels to be sure you don't take too much.   For children, check the Tylenol bottle for the right dose. The dose is based on  the child's age or weight.    3. If you have other health problems (like cancer, heart failure, an organ transplant or severe kidney disease): Call your specialty clinic if you don't feel better in the next 2 days.    4. Know when to call 911: If your breathing is so bad that it keeps you from doing normal activities, call 911 or go to the emergency room. Tell them that you've been staying home and may have COVID-19.    Sign Up for [facility] Loop  COVID-19 Symptoms  We know it's scary to hear you might have COVID-19. We want to track your symptoms to make sure you're OK over  the next 2 weeks.    Please look for an email from [employer]. This is a free, online program that we'll use to keep in touch. To sign up, click the link in the email you get.    If you don't get an email, please call your [facility] clinic. Ask them to sign you up for [employer].    You can learn more at [email or web address].  For informational purposes only. Not to replace the advice of your health care provider.   Copyright   2020 [facility]. Clinically reviewed by Anu Alcala. All rights reserved. [employer] [id number] - 04/20.      Thank you for taking steps to prevent the spread of this virus.  o Limit your contact with others.  o Wear a simple mask to cover your cough.  o Wash your hands well and often.  o If you need medical care, go to [email or web address] or contact your health care provider.     For more about COVID-19 and caring for yourself at home, visit the CDC website at https://www.cdc.gov/coronavirus/2019-ncov/about/steps-when-sick.html.     To learn about care at [facility], please go to [email or web address].     [facility] clinical trials (COVID-19 research studies): [email or web address].[email or web address]/[email or web address]-trials.    Below are the COVID-19 hotlines at the [facility] ([facility]). Interpreters are available.     For health questions: Call 620-350-5186 or 1-262.363.7098 (7 a.m. to 7 p.m.)    For questions about schools and childcare: Call 831-366-4960 or 1-287.191.6377 (7 a.m. to 7 p.m.)

## 2021-02-23 ENCOUNTER — E-VISIT (OUTPATIENT)
Dept: FAMILY MEDICINE | Facility: CLINIC | Age: 56
End: 2021-02-23
Payer: COMMERCIAL

## 2021-02-23 DIAGNOSIS — L30.9 ECZEMA, UNSPECIFIED TYPE: Primary | ICD-10-CM

## 2021-02-23 PROCEDURE — 99421 OL DIG E/M SVC 5-10 MIN: CPT | Performed by: FAMILY MEDICINE

## 2021-02-24 RX ORDER — TRIAMCINOLONE ACETONIDE 5 MG/G
CREAM TOPICAL 2 TIMES DAILY
Qty: 30 G | Refills: 3 | Status: SHIPPED | OUTPATIENT
Start: 2021-02-24 | End: 2022-02-15

## 2021-02-24 NOTE — TELEPHONE ENCOUNTER
Provider E-Visit time total (minutes): 5min    Some dry spots on chin.  Will try some triamcinolone, warned against chronic use.      F/u if not improving.

## 2021-04-17 ENCOUNTER — HEALTH MAINTENANCE LETTER (OUTPATIENT)
Age: 56
End: 2021-04-17

## 2021-06-07 ENCOUNTER — PATIENT OUTREACH (OUTPATIENT)
Dept: CARE COORDINATION | Facility: CLINIC | Age: 56
End: 2021-06-07

## 2021-06-07 DIAGNOSIS — Z71.89 OTHER SPECIFIED COUNSELING: Primary | Chronic | ICD-10-CM

## 2021-06-07 NOTE — PROGRESS NOTES
Clinic Care Coordination Contact  [facility]/Voicemail       Clinical Data: Care Coordinator Outreach  Outreach attempted x 1.  Left message on patient's voicemail with call back information and requested return call.  Plan:. Care Coordinator will try to reach patient again in 1-2 business days.

## 2021-06-07 NOTE — LETTER
[facility] CARE COORDINATION  [address] [facility] [address]    June 8, 2021    Shelley Lew  [address] [facility] [address]      Dear Shelley,    I am a clinic community health worker who works with Seth Pollard MD at [facility]. I wanted to introduce myself and provide you with my contact information for you to be able to call me with any questions or concerns. Below is a description of clinic care coordination and how I can further assist you.      The clinic care coordination team is made up of a registered nurse,  and community health worker who understand the health care system. The goal of clinic care coordination is to help you manage your health and improve access to the health care system in the most efficient manner. The team can assist you in meeting your health care goals by providing education, coordinating services, strengthening the communication among your providers and supporting you with any resource needs.    Please feel free to contact the Community Health Worker at [phone] with any questions or concerns. We are focused on providing you with the highest-quality healthcare experience possible and that all starts with you.     Sincerely,     Enriqueta Perez   Community Health Worker   Ph: [phone]  Fx: [phone]

## 2021-06-08 NOTE — PROGRESS NOTES
Clinic Care Coordination Contact  [facility]/Voicemail       Clinical Data: Care Coordinator Outreach  Outreach attempted x 2.  Left message on patient's voicemail with call back information and requested return call.  Plan: Care Coordinator will send care coordination introduction letter with care coordinator contact information and explanation of care coordination services via [employer]. Care Coordinator will do no further outreaches at this time.

## 2021-09-26 ENCOUNTER — HEALTH MAINTENANCE LETTER (OUTPATIENT)
Age: 56
End: 2021-09-26

## 2022-01-14 ENCOUNTER — NURSE TRIAGE (OUTPATIENT)
Dept: NURSING | Facility: CLINIC | Age: 57
End: 2022-01-14
Payer: COMMERCIAL

## 2022-01-15 NOTE — TELEPHONE ENCOUNTER
"States sx \"hit me really hard\" last night. Cough, non-productive, headache, body aches. Reports fever but no thermometer to measure temp. Denies SOB or chest pain. Able to stand, walk. Has not taken any pain/fever reducer yet. Denies known contact w/ covid + person. No covid test in recent past. Had covid once. Unvaccinated.  Advised e-visit within 24 hrs - per covid protocol.     Reason for Disposition    [1] COVID-19 infection suspected by caller or triager AND [2] mild symptoms (cough, fever, or others) AND [3] negative COVID-19 rapid test     No negative test but this best describes pt's sx    Additional Information    Negative: SEVERE difficulty breathing (e.g., struggling for each breath, speaks in single words)    Negative: Difficult to awaken or acting confused (e.g., disoriented, slurred speech)    Negative: Bluish (or gray) lips or face now    Negative: Shock suspected (e.g., cold/pale/clammy skin, too weak to stand, low BP, rapid pulse)    Negative: Sounds like a life-threatening emergency to the triager    Negative: [1] COVID-19 exposure AND [2] no symptoms    Negative: COVID-19 vaccine reaction suspected (e.g., fever, headache, muscle aches) occurring 1 to 3 days after getting vaccine    Negative: COVID-19 vaccine, questions about    Negative: [1] Lives with someone known to have influenza (flu test positive) AND [2] flu-like symptoms (e.g., cough, runny nose, sore throat, SOB; with or without fever)    Negative: [1] Adult with possible COVID-19 symptoms AND [2] triager concerned about severity of symptoms or other causes    Negative: COVID-19 and breastfeeding, questions about    Negative: SEVERE or constant chest pain or pressure (Exception: mild central chest pain, present only when coughing)    Negative: MODERATE difficulty breathing (e.g., speaks in phrases, SOB even at rest, pulse 100-120)    Negative: [1] Headache AND [2] stiff neck (can't touch chin to chest)    Negative: MILD difficulty " breathing (e.g., minimal/no SOB at rest, SOB with walking, pulse <100)    Negative: Chest pain or pressure    Negative: Patient sounds very sick or weak to the triager    Negative: [1] Fever > 101 F (38.3 C) AND [2] age > 60 years    Negative: [1] Fever > 100.0 F (37.8 C) AND [2] bedridden (e.g., nursing home patient, CVA, chronic illness, recovering from surgery)    Negative: HIGH RISK for severe COVID complications (e.g., age > 64 years, obesity with BMI > 25, pregnant, chronic lung disease or other chronic medical condition)  (Exception: Already seen by PCP and no new or worsening symptoms.)    Negative: [1] HIGH RISK patient AND [2] influenza is widespread in the community AND [3] ONE OR MORE respiratory symptoms: cough, sore throat, runny or stuffy nose    Negative: [1] HIGH RISK patient AND [2] influenza exposure within the last 7 days AND [3] ONE OR MORE respiratory symptoms: cough, sore throat, runny or stuffy nose    Commented on: Fever > 103 F (39.4 C)     No thermometer    Protocols used: CORONAVIRUS (COVID-19) DIAGNOSED OR [id number] 8.25.2021

## 2022-01-16 ENCOUNTER — HEALTH MAINTENANCE LETTER (OUTPATIENT)
Age: 57
End: 2022-01-16

## 2022-02-15 ENCOUNTER — OFFICE VISIT (OUTPATIENT)
Dept: ORTHOPEDICS | Facility: CLINIC | Age: 57
End: 2022-02-15
Payer: COMMERCIAL

## 2022-02-15 VITALS — WEIGHT: 160 LBS | SYSTOLIC BLOOD PRESSURE: 116 MMHG | BODY MASS INDEX: 27.46 KG/M2 | DIASTOLIC BLOOD PRESSURE: 72 MMHG

## 2022-02-15 DIAGNOSIS — M54.16 LUMBAR RADICULITIS: Primary | ICD-10-CM

## 2022-02-15 DIAGNOSIS — G57.02 PIRIFORMIS SYNDROME OF LEFT SIDE: ICD-10-CM

## 2022-02-15 PROCEDURE — 99203 OFFICE O/P NEW LOW 30 MIN: CPT | Performed by: ORTHOPAEDIC SURGERY

## 2022-02-15 ASSESSMENT — PAIN SCALES - GENERAL: PAINLEVEL: SEVERE PAIN (6)

## 2022-02-15 NOTE — LETTER
2/15/2022         RE: Shelley Lew  [id number]  [facility] [address]        Dear Colleague,    Thank you for referring your patient, Shelley Lew, to the [facility]. Please see a copy of my visit note below.    ORTHOPEDIC CONSULT      Chief Complaint: Shelley Lew is a 57 year old female presenting with left buttock pain. Last saw Dr. Hernandez on 10/23/2018. History of L4-L5 laminectomy with Dr. Felix @ [facility] in 4/2017.        Chief Complaints and History of Present Illnesses   Patient presents with     Lower Back - Pain             History of Present Illness:     Onset: Chronic with acute onset. Last 3 weeks, has had left buttock pain with radiation into the posterior thigh, down leg and around the left foot.  Pain: Electrical-type pain  Worsened by: lying on her left side, prolonged sitting and trying to get up. Sometimes painful with walking.  Better with: Rest  Treatments tried: Advil and rest  Associated symptoms: Pain only    Physical Exam:  No pain with flexion and extension of the left hip.  No pain with internal or external rotation of the left hip which is full.  No point tenderness of the greater trochanter.  Straight leg raise equivocal.  There is only a slight increase in pain going from the left buttock down the left posterior lateral thigh to the lateral and anterior leg to the top of the foot with lumbar extension and left side bending.  Distal motor and sensory examinations grossly intact.        Impression: Left lower extremity radiculopathy.  Possible piriformis syndrome    Plan:  All of the above pertinent physical exam and imaging modalities findings was reviewed.  Will refer to spine.  I did advise her that this may take a while to sort out.            BP Readings from Last 1 Encounters:   01/25/21 124/81                 Again, thank you for allowing me to participate in the care of your patient.        Sincerely,        Kai Perez, DO

## 2022-02-15 NOTE — PROGRESS NOTES
ORTHOPEDIC CONSULT      Chief Complaint: Shelley Lew is a 57 year old female presenting with left buttock pain. Last saw Dr. Hernandez on 10/23/2018. History of L4-L5 laminectomy with Dr. Felix @ [facility] in 4/2017.        Chief Complaints and History of Present Illnesses   Patient presents with     Lower Back - Pain             History of Present Illness:     Onset: Chronic with acute onset. Last 3 weeks, has had left buttock pain with radiation into the posterior thigh, down leg and around the left foot.  Pain: Electrical-type pain  Worsened by: lying on her left side, prolonged sitting and trying to get up. Sometimes painful with walking.  Better with: Rest  Treatments tried: Advil and rest  Associated symptoms: Pain only    Physical Exam:  No pain with flexion and extension of the left hip.  No pain with internal or external rotation of the left hip which is full.  No point tenderness of the greater trochanter.  Straight leg raise equivocal.  There is only a slight increase in pain going from the left buttock down the left posterior lateral thigh to the lateral and anterior leg to the top of the foot with lumbar extension and left side bending.  Distal motor and sensory examinations grossly intact.        Impression: Left lower extremity radiculopathy.  Possible piriformis syndrome    Plan:  All of the above pertinent physical exam and imaging modalities findings was reviewed.  Will refer to spine.  I did advise her that this may take a while to sort out.            BP Readings from Last 1 Encounters:   01/25/21 124/81

## 2022-05-08 ENCOUNTER — HEALTH MAINTENANCE LETTER (OUTPATIENT)
Age: 57
End: 2022-05-08

## 2022-06-30 ENCOUNTER — NURSE TRIAGE (OUTPATIENT)
Dept: FAMILY MEDICINE | Facility: CLINIC | Age: 57
End: 2022-06-30

## 2022-06-30 NOTE — TELEPHONE ENCOUNTER
"S-(situation): Pt has numbness in Left arm that started today, that radiates up her arm.     B-(background): Pt has a hx of tobacco use disorder, Hyperlipidemia.      A-(assessment): Pt has numbness in Left arm that started today, that radiates up her arm. Pt states that it started today after waking up. She denies weakness, Pt stated she hasd some chest pain that lasted a few minutes yesterday. It went away and hse has not had it since. Pt reports that other symptoms present are  Headache, nauseous, lightheadedness, SOB. Pt stated seh has had a headache since Sunday.     R-(recommendations):Pt was advised to be seen in the ED right now due to symptoms and headache. Pt agrees with recommendations. she stated she will go to [place] ER now. Huddled with provider and she agrees with recommendation to be seen in ER right now.     Reason for Disposition    Headache (with neurologic deficit)    Additional Information    Negative: Difficult to awaken or acting confused (e.g., disoriented, slurred speech)    Negative: New neurologic deficit that is present NOW, sudden onset of ANY of the following: * Weakness of the face, arm, or leg on one side of the body* Numbness of the face, arm, or leg on one side of the body* Loss of speech or garbled speech    Negative: Sounds like a life-threatening emergency to the triager    Negative: Confusion, disorientation, or hallucinations is the main symptom    Negative: Dizziness is the main symptom    Negative: Followed a head injury within last 3 days    Answer Assessment - Initial Assessment Questions  1. SYMPTOM: \"What is the main symptom you are concerned about?\" (e.g., weakness, numbness)      Numbness in finger tips and up her left arm.     2. ONSET: \"When did this start?\" (minutes, hours, days; while sleeping)      Started today, Pt stated she thought she slept on it wrong.     3. LAST NORMAL: \"When was the last time you were normal (no symptoms)?\"      Pt denies feeling like " "that yesterday.   4. PATTERN \"Does this come and go, or has it been constant since it started?\"  \"Is it present now?\"      Pt reports that it has stayed the same. Pt stated its crawling up her arm.     5. CARDIAC SYMPTOMS: \"Have you had any of the following symptoms: chest pain, difficulty breathing, palpitations?\"      Chest pain yesterday, but went away   6. NEUROLOGIC SYMPTOMS: \"Have you had any of the following symptoms: headache, dizziness, vision loss, double vision, changes in speech, unsteady on your feet?\"     Headache, nauseous, lightheadedness, SOB    7. OTHER SYMPTOMS: \"Do you have any other symptoms?\"      SOB  8. PREGNANCY: \"Is there any chance you are pregnant?\" \"When was your last menstrual period?\"      NA    Protocols used: NEUROLOGIC DEFICIT-A-[place]    "

## 2022-06-30 NOTE — TELEPHONE ENCOUNTER
Reason for Call:  Other appointment    Detailed comments: She has an appointment set up for the July 18th for a physical but would like to be seen sooner to talk about some numbness she is having in her left arm     Phone Number Patient can be reached at: Cell number on file:    Telephone Information:   Mobile [phone]       Best Time: anytime    Can we leave a detailed message on this number? YES    Call taken on 6/30/2022 at 4:35 PM by Christina Palmer

## 2022-08-04 NOTE — ANESTHESIA POSTPROCEDURE EVALUATION
INITIAL NEUROLOGY CONSULTATION    DATE OF VISIT: 8/4/2022  CLINIC LOCATION: [facility]  MRN: [id number]  PATIENT NAME: Ole Luz  YOB: 1967    REASON FOR VISIT: No chief complaint on file.    HISTORY OF PRESENT ILLNESS:                                                    Mr. Ole Luz is 54 year old right handed male patient with past medical history of bilateral sensorineural hearing loss, who was seen today for left foot pain.    Per patient's report, ***.  He became concerned regarding pinched nerve despite reassuring lumbar spine MRI and EMG.    According to Care Everywhere, EMG of bilateral lower extremities, performed for feet paresthesia on 03/04/2016, was negative for vertebral neuropathy or large fiber peripheral polyneuropathy.  Ultrasound of both tarsal tunnels at that time was unremarkable.  Brain MRI with and without contrast from 09/29/2016 demonstrated diffuse parenchymal volume loss and scattered nonspecific white matter hyperintensities, stable compared to 2012 study.  Neck MRA from the same day was unrevealing.  Cervical spine MRI from the next day demonstrated multilevel degenerative changes, no but no evidence of significant spinal canal or neuroforaminal stenosis.  PAST MEDICAL/SURGICAL HISTORY:                                                    I personally reviewed patient's past medical and surgical history with the patient at today's visit.  MEDICATIONS:                                                    I personally reviewed patient's medications and allergies with the patient at today's visit.  ALLERGIES:                                                    Not on File  EXAM:                                                    VITAL SIGNS:   There were no vitals taken for this visit.  Mini-Cog Assessment:       General: pt is in NAD, cooperative.  Skin: normal turgor, moist mucous membranes, no lesions/rashes noticed.  HEENT: ATNC, EOMI,  Patient: Shelley Lew    Procedure(s):  Open Carpal Tunnel Release    Diagnosis:right carpal tunnel syndrome  Diagnosis Additional Information: No value filed.    Anesthesia Type:  MAC    Note:  Anesthesia Post Evaluation    Patient location during evaluation: Phase 2  Patient participation: Able to fully participate in evaluation  Level of consciousness: awake and alert  Pain management: adequate  Airway patency: patent  Cardiovascular status: acceptable and hemodynamically stable  Respiratory status: acceptable, room air and spontaneous ventilation  Hydration status: acceptable  PONV: none     Anesthetic complications: None          Last vitals:  Vitals:    10/15/19 1630 10/15/19 1640 10/15/19 1650   BP: 120/72 122/68 121/68   Pulse: 66     Resp:  16 16   Temp:      SpO2: 99% 99% 99%         Electronically Signed By: ROBERTA Vences CRNA  October 15, 2019  5:24 PM   PERRL, white sclera, normal conjunctiva, no nystagmus or ptosis. No carotid bruits bilaterally.  Respiratory: lung sounds clear to auscultation bilaterally, no crackles, wheezes, rhonchi. Symmetric lung excursion, no accessory respiratory muscle use.  Cardiovascular: normal S1/S2, no murmurs/rubs/gallops.   Abdomen: Not distended.  : deferred.    Neurological:  Mental: alert, follows commands,  /5 with ***/3 on memory recall, no aphasia or dysarthria. Fund of knowledge is {MYAPPROPRIATE:017348}  Cranial Nerves:  CN II: visual acuity - able to accurately count fingers with each eye. Visual fields intact, fundi: discs sharp, no papilledema and normal vessels bilaterally.  CN III, IV, VI: EOM intact, pupils equal and reactive  CN V: facial sensation nl  CN VII: face symmetric, no facial droop  CN VIII: hearing normal  CN IX: palate elevation symmetric, uvula at midline  CN XI SCM normal, shoulder shrug nl  CN XII: tongue midline  Motor: Strength: 5/5 in all major groups of all extremities. Normal tone. No abnormal movements. No pronator drift b/l.  Reflexes: Triceps, biceps, brachioradialis, patellar, and achilles reflexes normal and symmetric. No clonus noted. Toes are down-going b/l.   Sensory: light touch, pinprick, and vibration intact. Romberg: negative.  Coordination: FNF and heel-shin tests intact b/l.   Gait:  Normal, able to tandem walk *** without difficulty.  DATA:   LABS/EEG/IMAGING/OTHER STUDIES: I reviewed pertinent medical records, as detailed in the history of present illness.  ASSESSMENT AND PLAN:      ASSESSMENT: Ole Luz is a 54 year old male patient with listed above past medical history, who presents with ***.    We had a detailed discussion with the patient regarding his presenting complaints.  The neurological exam today is ***.    DIAGNOSES:  No diagnosis found.  PLAN: There are no Patient Instructions on file for this visit.    Total Time: *** minutes spent on the date of the  encounter doing chart review, history and exam, documentation and further activities per the note.    Jona Murray MD  [facility]  (Chart documentation was completed in part with Dragon voice-recognition software. Even though reviewed, some grammatical, spelling, and word errors may remain.)

## 2022-10-25 ENCOUNTER — OFFICE VISIT (OUTPATIENT)
Dept: FAMILY MEDICINE | Facility: OTHER | Age: 57
End: 2022-10-25
Payer: COMMERCIAL

## 2022-10-25 VITALS
RESPIRATION RATE: 20 BRPM | HEART RATE: 104 BPM | TEMPERATURE: 98.5 F | OXYGEN SATURATION: 97 % | HEIGHT: 63 IN | SYSTOLIC BLOOD PRESSURE: 112 MMHG | WEIGHT: 167.5 LBS | BODY MASS INDEX: 29.68 KG/M2 | DIASTOLIC BLOOD PRESSURE: 72 MMHG

## 2022-10-25 DIAGNOSIS — Z87.891 PERSONAL HISTORY OF TOBACCO USE: ICD-10-CM

## 2022-10-25 DIAGNOSIS — B18.2 CHRONIC HEPATITIS C WITHOUT HEPATIC COMA (H): ICD-10-CM

## 2022-10-25 DIAGNOSIS — R01.1 HEART MURMUR: ICD-10-CM

## 2022-10-25 DIAGNOSIS — Z00.00 ROUTINE GENERAL MEDICAL EXAMINATION AT A HEALTH CARE FACILITY: Primary | ICD-10-CM

## 2022-10-25 DIAGNOSIS — G89.29 CHRONIC LOW BACK PAIN, UNSPECIFIED BACK PAIN LATERALITY, UNSPECIFIED WHETHER SCIATICA PRESENT: ICD-10-CM

## 2022-10-25 DIAGNOSIS — M54.50 CHRONIC LOW BACK PAIN, UNSPECIFIED BACK PAIN LATERALITY, UNSPECIFIED WHETHER SCIATICA PRESENT: ICD-10-CM

## 2022-10-25 DIAGNOSIS — E66.3 OVERWEIGHT (BMI 25.0-29.9): ICD-10-CM

## 2022-10-25 DIAGNOSIS — Z12.31 VISIT FOR SCREENING MAMMOGRAM: ICD-10-CM

## 2022-10-25 DIAGNOSIS — F32.1 CURRENT MODERATE EPISODE OF MAJOR DEPRESSIVE DISORDER, UNSPECIFIED WHETHER RECURRENT (H): ICD-10-CM

## 2022-10-25 DIAGNOSIS — F19.91 HISTORY OF DRUG USE: ICD-10-CM

## 2022-10-25 DIAGNOSIS — R07.89 OTHER CHEST PAIN: ICD-10-CM

## 2022-10-25 PROBLEM — F19.11 H/O DRUG ABUSE (H): Status: RESOLVED | Noted: 2017-07-06 | Resolved: 2022-10-25

## 2022-10-25 PROBLEM — N39.3 STRESS INCONTINENCE OF URINE: Status: ACTIVE | Noted: 2017-03-28

## 2022-10-25 PROBLEM — R42 VERTIGO: Status: ACTIVE | Noted: 2018-06-23

## 2022-10-25 LAB
ALBUMIN SERPL-MCNC: 4.1 G/DL (ref 3.4–5)
ALP SERPL-CCNC: 89 U/L (ref 40–150)
ALT SERPL W P-5'-P-CCNC: 27 U/L (ref 0–50)
ANION GAP SERPL CALCULATED.3IONS-SCNC: 2 MMOL/L (ref 3–14)
AST SERPL W P-5'-P-CCNC: 17 U/L (ref 0–45)
BASOPHILS # BLD AUTO: 0 10E3/UL (ref 0–0.2)
BASOPHILS NFR BLD AUTO: 1 %
BILIRUB SERPL-MCNC: 0.3 MG/DL (ref 0.2–1.3)
BUN SERPL-MCNC: 11 MG/DL (ref 7–30)
CALCIUM SERPL-MCNC: 9.1 MG/DL (ref 8.5–10.1)
CHLORIDE BLD-SCNC: 106 MMOL/L (ref 94–109)
CHOLEST SERPL-MCNC: 188 MG/DL
CO2 SERPL-SCNC: 32 MMOL/L (ref 20–32)
CREAT SERPL-MCNC: 0.73 MG/DL (ref 0.52–1.04)
EOSINOPHIL # BLD AUTO: 0.1 10E3/UL (ref 0–0.7)
EOSINOPHIL NFR BLD AUTO: 2 %
ERYTHROCYTE [DISTWIDTH] IN BLOOD BY AUTOMATED COUNT: 12.4 % (ref 10–15)
FASTING STATUS PATIENT QL REPORTED: NO
GFR SERPL CREATININE-BSD FRML MDRD: >90 ML/MIN/1.73M2
GLUCOSE BLD-MCNC: 97 MG/DL (ref 70–99)
HCT VFR BLD AUTO: 43 % (ref 35–47)
HDLC SERPL-MCNC: 62 MG/DL
HGB BLD-MCNC: 14.4 G/DL (ref 11.7–15.7)
LDLC SERPL CALC-MCNC: 101 MG/DL
LYMPHOCYTES # BLD AUTO: 1.7 10E3/UL (ref 0.8–5.3)
LYMPHOCYTES NFR BLD AUTO: 27 %
MCH RBC QN AUTO: 31.1 PG (ref 26.5–33)
MCHC RBC AUTO-ENTMCNC: 33.5 G/DL (ref 31.5–36.5)
MCV RBC AUTO: 93 FL (ref 78–100)
MONOCYTES # BLD AUTO: 0.6 10E3/UL (ref 0–1.3)
MONOCYTES NFR BLD AUTO: 10 %
NEUTROPHILS # BLD AUTO: 3.9 10E3/UL (ref 1.6–8.3)
NEUTROPHILS NFR BLD AUTO: 61 %
NONHDLC SERPL-MCNC: 126 MG/DL
PLATELET # BLD AUTO: 289 10E3/UL (ref 150–450)
POTASSIUM BLD-SCNC: 4 MMOL/L (ref 3.4–5.3)
PROT SERPL-MCNC: 7.6 G/DL (ref 6.8–8.8)
RBC # BLD AUTO: 4.63 10E6/UL (ref 3.8–5.2)
SODIUM SERPL-SCNC: 140 MMOL/L (ref 133–144)
TRIGL SERPL-MCNC: 123 MG/DL
WBC # BLD AUTO: 6.5 10E3/UL (ref 4–11)

## 2022-10-25 PROCEDURE — G0296 VISIT TO DETERM LDCT ELIG: HCPCS | Performed by: PHYSICIAN ASSISTANT

## 2022-10-25 PROCEDURE — 93000 ELECTROCARDIOGRAM COMPLETE: CPT | Performed by: PHYSICIAN ASSISTANT

## 2022-10-25 PROCEDURE — 85025 COMPLETE CBC W/AUTO DIFF WBC: CPT | Performed by: PHYSICIAN ASSISTANT

## 2022-10-25 PROCEDURE — 99396 PREV VISIT EST AGE 40-64: CPT | Performed by: PHYSICIAN ASSISTANT

## 2022-10-25 PROCEDURE — 36415 COLL VENOUS BLD VENIPUNCTURE: CPT | Performed by: PHYSICIAN ASSISTANT

## 2022-10-25 PROCEDURE — 80061 LIPID PANEL: CPT | Performed by: PHYSICIAN ASSISTANT

## 2022-10-25 PROCEDURE — 80053 COMPREHEN METABOLIC PANEL: CPT | Performed by: PHYSICIAN ASSISTANT

## 2022-10-25 PROCEDURE — 99215 OFFICE O/P EST HI 40 MIN: CPT | Mod: 25 | Performed by: PHYSICIAN ASSISTANT

## 2022-10-25 RX ORDER — DULOXETIN HYDROCHLORIDE 30 MG/1
30 CAPSULE, DELAYED RELEASE ORAL DAILY
Qty: 30 CAPSULE | Refills: 1 | Status: SHIPPED | OUTPATIENT
Start: 2022-10-25 | End: 2023-04-03

## 2022-10-25 ASSESSMENT — ENCOUNTER SYMPTOMS
DIARRHEA: 0
JOINT SWELLING: 1
BREAST MASS: 0
HEADACHES: 1
WEAKNESS: 1
HEMATURIA: 0
PARESTHESIAS: 1
HEMATOCHEZIA: 0
COUGH: 0
DYSURIA: 0
CHILLS: 0
EYE PAIN: 0
HEARTBURN: 0
FEVER: 0
SORE THROAT: 0
NAUSEA: 1
FREQUENCY: 0
MYALGIAS: 1
NERVOUS/ANXIOUS: 0
DIZZINESS: 1
PALPITATIONS: 0
ARTHRALGIAS: 1
SHORTNESS OF BREATH: 1
ABDOMINAL PAIN: 0
CONSTIPATION: 0

## 2022-10-25 ASSESSMENT — PAIN SCALES - GENERAL: PAINLEVEL: NO PAIN (0)
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
## 2022-10-25 NOTE — PATIENT INSTRUCTIONS
Lung Cancer Screening   Frequently Asked Questions  If you are at high-risk for lung cancer, getting screened with low-dose computed tomography (LDCT) every year can help save your life. This handout offers answers to some of the most common questions about lung cancer screening. If you have other questions, please call [phone] ([phone]).     What is it?  Lung cancer screening uses special X-ray technology to create an image of your lung tissue. The exam is quick and easy and takes less than 10 seconds. We don t give you any medicine or use any needles. You can eat before and after the exam. You don t need to change your clothes as long as the clothing on your chest doesn t contain metal. But, you do need to be able to hold your breath for at least 6 seconds during the exam.    What is the goal of lung cancer screening?  The goal of lung cancer screening is to save lives. Many times, lung cancer is not found until a person starts having physical symptoms. Lung cancer screening can help detect lung cancer in the earliest stages when it may be easier to treat.    Who should be screened for lung cancer?  We suggest lung cancer screening for anyone who is at high-risk for lung cancer. You are in the high-risk group if you:      are between the ages of 55 and 79, and    have smoked at least 1 pack of cigarettes a day for 20 or more years, and    still smoke or have quit within the past 15 years.    However, if you have a new cough or shortness of breath, you should talk to your doctor before being screened.    Why does it matter if I have symptoms?  Certain symptoms can be a sign that you have a condition in your lungs that should be checked and treated by your doctor. These symptoms include fever, chest pain, a new or changing cough, shortness of breath that you have never felt before, coughing up blood or unexplained weight loss. Having any of these symptoms can greatly affect the results of lung  cancer screening.       Should all smokers get an LDCT lung cancer screening exam?  It depends. Lung cancer screening is for a very specific group of men and women who have a history of heavy smoking over a long period of time (see  Who should be screened for lung cancer  above).  I am in the high-risk group, but have been diagnosed with cancer in the past. Is LDCT lung cancer screening right for me?  In some cases, you should not have LDCT lung screening, such as when your doctor is already following your cancer with CT scan studies. Your doctor will help you decide if LDCT lung screening is right for you.  Do I need to have a screening exam every year?  Yes. If you are in the high-risk group described earlier, you should get an LDCT lung cancer screening exam every year until you are 79, or are no longer willing or able to undergo screening and possible procedures to diagnose and treat lung cancer.  How effective is LDCT at preventing death from lung cancer?  Studies have shown that LDCT lung cancer screening can lower the risk of death from lung cancer by 20 percent in people who are at high-risk.  What are the risks?  There are some risks and limitations of LDCT lung cancer screening. We want to make sure you understand the risks and benefits, so please let us know if you have any questions. Your doctor may want to talk with you more about these risks.    Radiation exposure: As with any exam that uses radiation, there is a very small increased risk of cancer. The amount of radiation in LDCT is small--about the same amount a person would get from a mammogram. Your doctor orders the exam when he or she feels the potential benefits outweigh the risks.    False negatives: No test is perfect, including LDCT. It is possible that you may have a medical condition, including lung cancer, that is not found during your exam. This is called a false negative result.    False positives and more testing: LDCT very often finds  something in the lung that could be cancer, but in fact is not. This is called a false positive result. False positive tests often cause anxiety. To make sure these findings are not cancer, you may need to have more tests. These tests will be done only if you give us permission. Sometimes patients need a treatment that can have side effects, such as a biopsy. For more information on false positives, see  What can I expect from the results?     Findings not related to lung cancer: Your LDCT exam also takes pictures of areas of your body next to your lungs. In a very small number of cases, the CT scan will show an abnormal finding in one of these areas, such as your kidneys, adrenal glands, liver or thyroid. This finding may not be serious, but you may need more tests. Your doctor can help you decide what other tests you may need, if any.  What can I expect from the results?  About 1 out of 4 LDCT exams will find something that may need more tests. Most of the time, these findings are lung nodules. Lung nodules are very small collections of tissue in the lung. These nodules are very common, and the vast majority--more than 97 percent--are not cancer (benign). Most are normal lymph nodes or small areas of scarring from past infections.  But, if a small lung nodule is found to be cancer, the cancer can be cured more than 90 percent of the time. To know if the nodule is cancer, we may need to get more images before your next yearly screening exam. If the nodule has suspicious features (for example, it is large, has an odd shape or grows over time), we will refer you to a specialist for further testing.  Will my doctor also get the results?  Yes. Your doctor will get a copy of your results.  Is it okay to keep smoking now that there s a cancer screening exam?  No. Tobacco is one of the strongest cancer-causing agents. It causes not only lung cancer, but other cancers and cardiovascular (heart) diseases as well. The damage  caused by smoking builds over time. This means that the longer you smoke, the higher your risk of disease. While it is never too late to quit, the sooner you quit, the better.  Where can I find help to quit smoking?  The best way to prevent lung cancer is to stop smoking. If you have already quit smoking, congratulations and keep it up! For help on quitting smoking, please call [employer] at 5-432-QUITNOW (1-120.672.4176) or the American [employer] Society at 1-802.716.6021 to find local resources near you.  One-on-one health coaching:  If you d prefer to work individually with a health care provider on tobacco cessation, we offer:      Medication Therapy Management:  Our specially trained pharmacists work closely with you and your doctor to help you quit smoking.  Call [phone] or [phone] (toll free).    Preventive Health Recommendations  Female Ages 50 - 64    Yearly exam: See your health care provider every year in order to  o Review health changes.   o Discuss preventive care.    o Review your medicines if your doctor has prescribed any.      Get a Pap test every three years (unless you have an abnormal result and your provider advises testing more often).    If you get Pap tests with HPV test, you only need to test every 5 years, unless you have an abnormal result.     You do not need a Pap test if your uterus was removed (hysterectomy) and you have not had cancer.    You should be tested each year for STDs (sexually transmitted diseases) if you're at risk.     Have a mammogram every 1 to 2 years.    Have a colonoscopy at age 50, or have a yearly FIT test (stool test). These exams screen for colon cancer.      Have a cholesterol test every 5 years, or more often if advised.    Have a diabetes test (fasting glucose) every three years. If you are at risk for diabetes, you should have this test more often.     If you are at risk for osteoporosis (brittle bone disease), think about having a bone density scan  (DEXA).    Shots: Get a flu shot each year. Get a tetanus shot every 10 years.    Nutrition:     Eat at least 5 servings of fruits and vegetables each day.    Eat whole-grain bread, whole-wheat pasta and brown rice instead of white grains and rice.    Get adequate Calcium and Vitamin D.     Lifestyle    Exercise at least 150 minutes a week (30 minutes a day, 5 days a week). This will help you control your weight and prevent disease.    Limit alcohol to one drink per day.    No smoking.     Wear sunscreen to prevent skin cancer.     See your dentist every six months for an exam and cleaning.    See your eye doctor every 1 to 2 years.

## 2022-12-08 ENCOUNTER — HOSPITAL ENCOUNTER (OUTPATIENT)
Dept: CT IMAGING | Facility: CLINIC | Age: 57
Discharge: HOME | End: 2022-12-08
Attending: PHYSICIAN ASSISTANT
Payer: COMMERCIAL

## 2022-12-08 ENCOUNTER — HOSPITAL ENCOUNTER (OUTPATIENT)
Dept: MAMMOGRAPHY | Facility: CLINIC | Age: 57
Discharge: HOME | End: 2022-12-08
Attending: PHYSICIAN ASSISTANT
Payer: COMMERCIAL

## 2022-12-08 DIAGNOSIS — Z87.891 PERSONAL HISTORY OF TOBACCO USE: ICD-10-CM

## 2022-12-08 DIAGNOSIS — Z12.31 VISIT FOR SCREENING MAMMOGRAM: ICD-10-CM

## 2022-12-08 PROCEDURE — 77067 SCR MAMMO BI INCL CAD: CPT

## 2022-12-08 PROCEDURE — 71271 CT THORAX LUNG CANCER SCR C-: CPT

## 2023-03-20 ENCOUNTER — MYC MEDICAL ADVICE (OUTPATIENT)
Dept: FAMILY MEDICINE | Facility: OTHER | Age: 58
End: 2023-03-20
Payer: COMMERCIAL

## 2023-03-20 ENCOUNTER — TELEPHONE (OUTPATIENT)
Dept: FAMILY MEDICINE | Facility: OTHER | Age: 58
End: 2023-03-20
Payer: COMMERCIAL

## 2023-03-20 NOTE — TELEPHONE ENCOUNTER
Please call patient to help her been seen in clinic as foot pain cannot be evaluated via phone or video. She can use virtual slots on Tuesday, 03/21/23, for an office visit. Otherwise can use any other available timeslot.    Ashvin Sherman PA-C on 3/20/2023 at 6:59 AM

## 2023-03-21 ENCOUNTER — TELEPHONE (OUTPATIENT)
Dept: BEHAVIORAL HEALTH | Facility: CLINIC | Age: 58
End: 2023-03-21
Payer: COMMERCIAL

## 2023-03-21 NOTE — TELEPHONE ENCOUNTER
"[facility] PHQ-9 Follow-up  Behavioral Health Clinician Triage Service    [facility] PHQ-9 Responses:  [facility] Follow-up to PHQ 9/26/2019 3/16/2020 3/20/2023   PHQ-9 9. Suicide Ideation past 2 weeks Not at all Several days Several days   Thoughts of suicide or self harm in past 2 weeks - No No   Thoughts of suicide or self harm in past 2 weeks - No No   PHQ-9 Safety concerns? - No No   PHQ-9 Safety concerns? - No No        1st Outreach Date: March 21, 2023 Time: 12:32pm  Outcome: No Answer.  [facility] Pool will make one more phone attempt within 24 hours.      2nd Outreach Date: March 21, 2023 Time: 1:47pm  Outcome: Completed phone conversation / triage service.  See assessment and disposition below.    [facility] Intern reached patient who was at home with her two dogs. She currently lives alone but identified several close friends she contacts for support. Reports experiencing the loss of her partner 6 months ago in an accident. Described losing her father two weeks ago, a family member, and two friends in the past 2 years. Stated she thought about suicide frequently immediately after her partner's passing. Indicated over the past several weeks these thoughts were infrequent and easily controllable. Stated she is \"managing the best she can\" and tries to do activities outside of her home when she has the energy. Denied previous involvement with therapy services but is currently seeking out support through grief groups on Facebook and in her area. Discussed the importance of connection during this time and the value groups have in connecting people with similar experiences together.  Provided patient with an overview of [facility] services and information on support and scheduling.     Risk Assessment:  Patient denies a history of suicidal ideation, suicide attempts, self-injurious behavior, homicidal ideation, homicidal behavior and and other safety concerns  Patient reports the following current or recent suicidal ideation or behaviors: " suicidal ideation after the loss of her partner 6 months ago. Reports ideation has decreased since then and has no intent, plan or means. .  Patient denies current or recent homicidal ideation or behaviors.  Patient denies current or recent self injurious behavior or ideation.  Patient denies other safety concerns.  Patient reports there are no firearms in the house  Protective Factors Sense of responsibility to family, Positive problem-solving skills and Positive social support, Pets   Risk Factors Current high stress, recent loss    ASQ Assessment:  1. In the past few weeks, have you wished you were dead?  Yes: Passive ideation as a means to end emotional suffering associated with loss.  2. In the past few weeks, have you felt that you or your family would be better off if you         were dead?  No  3. In the past week, have you been having thoughts about killing yourself?  No  4. Have you ever tried to kill yourself?  No    Disposition:    - Recommendations / Safety Plan: A safety and risk management plan has not been developed at this time, however patient was encouraged to call [facility] should there be a change in any of these risk factors.       Lissa Neito  Behavioral Health Clinician Intern  Supervised by Shant La Psy.D  [facility] [place]

## 2023-04-03 ENCOUNTER — HOSPITAL ENCOUNTER (EMERGENCY)
Facility: CLINIC | Age: 58
Discharge: HOME | End: 2023-04-03
Attending: NURSE PRACTITIONER | Admitting: NURSE PRACTITIONER
Payer: COMMERCIAL

## 2023-04-03 VITALS
BODY MASS INDEX: 26.58 KG/M2 | HEART RATE: 101 BPM | WEIGHT: 150 LBS | RESPIRATION RATE: 20 BRPM | HEIGHT: 63 IN | TEMPERATURE: 97.4 F | OXYGEN SATURATION: 97 % | SYSTOLIC BLOOD PRESSURE: 157 MMHG | DIASTOLIC BLOOD PRESSURE: 92 MMHG

## 2023-04-03 DIAGNOSIS — R19.7 VOMITING AND DIARRHEA: ICD-10-CM

## 2023-04-03 DIAGNOSIS — R11.10 VOMITING AND DIARRHEA: ICD-10-CM

## 2023-04-03 DIAGNOSIS — R11.0 NAUSEA: ICD-10-CM

## 2023-04-03 LAB
ALBUMIN UR-MCNC: NEGATIVE MG/DL
AMORPH CRY #/AREA URNS HPF: ABNORMAL /HPF
APPEARANCE UR: ABNORMAL
BILIRUB UR QL STRIP: NEGATIVE
COLOR UR AUTO: YELLOW
GLUCOSE UR STRIP-MCNC: NEGATIVE MG/DL
HGB UR QL STRIP: ABNORMAL
KETONES UR STRIP-MCNC: NEGATIVE MG/DL
LEUKOCYTE ESTERASE UR QL STRIP: NEGATIVE
NITRATE UR QL: NEGATIVE
PH UR STRIP: 7 [PH] (ref 5–7)
RBC URINE: 9 /HPF
SP GR UR STRIP: 1.02 (ref 1–1.03)
SQUAMOUS EPITHELIAL: 3 /HPF
UROBILINOGEN UR STRIP-MCNC: NORMAL MG/DL
WBC URINE: 2 /HPF

## 2023-04-03 PROCEDURE — 250N000011 HC RX IP 250 OP 636: Performed by: NURSE PRACTITIONER

## 2023-04-03 PROCEDURE — 250N000013 HC RX MED GY IP 250 OP 250 PS 637: Performed by: FAMILY MEDICINE

## 2023-04-03 PROCEDURE — 87086 URINE CULTURE/COLONY COUNT: CPT | Performed by: NURSE PRACTITIONER

## 2023-04-03 PROCEDURE — 99283 EMERGENCY DEPT VISIT LOW MDM: CPT | Performed by: NURSE PRACTITIONER

## 2023-04-03 PROCEDURE — 81001 URINALYSIS AUTO W/SCOPE: CPT | Performed by: NURSE PRACTITIONER

## 2023-04-03 RX ORDER — ONDANSETRON 4 MG/1
4 TABLET, ORALLY DISINTEGRATING ORAL EVERY 6 HOURS PRN
Qty: 10 TABLET | Refills: 0 | Status: SHIPPED | OUTPATIENT
Start: 2023-04-03 | End: 2023-04-06

## 2023-04-03 RX ORDER — ONDANSETRON 4 MG/1
4 TABLET, ORALLY DISINTEGRATING ORAL ONCE
Status: COMPLETED | OUTPATIENT
Start: 2023-04-03 | End: 2023-04-03

## 2023-04-03 RX ORDER — ACETAMINOPHEN 500 MG
1000 TABLET ORAL ONCE
Status: COMPLETED | OUTPATIENT
Start: 2023-04-03 | End: 2023-04-03

## 2023-04-03 RX ADMIN — ACETAMINOPHEN 1000 MG: 500 TABLET ORAL at 18:40

## 2023-04-03 RX ADMIN — ONDANSETRON 4 MG: 4 TABLET, ORALLY DISINTEGRATING ORAL at 20:05

## 2023-04-03 NOTE — ED TRIAGE NOTES
Pain started with a headache 2 days ago and then back pain started has had UTI before     Triage Assessment     Row Name 04/03/23 5936       Triage Assessment (Adult)    Airway WDL WDL       Respiratory WDL    Respiratory WDL WDL       Skin Circulation/Temperature WDL    Skin Circulation/Temperature WDL WDL       Cardiac WDL    Cardiac WDL WDL       Peripheral/Neurovascular WDL    Peripheral Neurovascular WDL WDL       Cognitive/Neuro/Behavioral WDL    Cognitive/Neuro/Behavioral WDL WDL

## 2023-04-04 NOTE — ED PROVIDER NOTES
History     Chief Complaint   Patient presents with     Back Pain     THINKS HAS BLADDER INFECTION     HPI  Shelley Lew is a 58 year old female who presents with report of back pain, headache, nausea, vomiting, abdominal pain.  Patient reports onset of symptoms Saturday.  Patient reports yesterday she had multiple episodes of vomiting and diarrhea.  Patient reports when she has had similar symptoms in the past she has experienced a urinary tract infection.  Patient concerned she may have that presently.  Patient denies any dysuria, hematuria.  She reports tactile fever last night but did not check her temperature.  Patient reports otherwise feeling well and denies any chest pain currently, mental confusion, left or right-sided facial droop.    Allergies:  Allergies   Allergen Reactions     Percocet [Oxycodone-Acetaminophen] Nausea and Vomiting     Nausea vomiting and head swimming     Vicodin [Hydrocodone-Acetaminophen] Nausea and Vomiting       Problem List:    Patient Active Problem List    Diagnosis Date Noted     Chronic hepatitis C without hepatic coma (H) 10/09/2019     Priority: Medium     Treated.  Sustained response.  Felt to be cured        Vertigo 06/23/2018     Priority: Medium     Hyperlipidemia LDL goal <130 07/06/2017     Priority: Medium     Stress incontinence of urine 03/28/2017     Priority: Medium     Spinal stenosis of lumbar region with neurogenic claudication 02/16/2017     Priority: Medium     Spine surg referral.  Many MRI findings.  Severely limited by this.    Intermittent meth use has been an issue for her, would be careful with opioids.         Tobacco use disorder 02/16/2017     Priority: Medium     Health Care Home 03/31/2016     Priority: Medium       Status:  Unable to reach  Care Coordinator:  Nabila Coffman    See Letters for [facility] Care Plan  Date:  March 31, 2016           CTS (carpal tunnel syndrome) 05/15/2013     Priority: Medium     CARDIOVASCULAR SCREENING; LDL GOAL  "LESS THAN 160 10/31/2010     Priority: Medium        Past Medical History:    Past Medical History:   Diagnosis Date      (normal spontaneous vaginal delivery)      PONV (postoperative nausea and vomiting)      Pyelonephritis, acute        Past Surgical History:    Past Surgical History:   Procedure Laterality Date     CHOLECYSTECTOMY       COLONOSCOPY N/A 2020    Procedure: COLONOSCOPY;  Surgeon: Shawn Amor MD;  Location: [facility]     LAMINECTOMY LUMBAR POSTERIOR MICROSCOPIC TWO LEVELS N/A 2017    Procedure: LAMINECTOMY LUMBAR POSTERIOR MICROSCOPIC TWO LEVELS;  Surgeon: Nehemias Bangura MD;  Location: [facility]     LAPAROSCOPY,TUBAL CAUTERY       RELEASE CARPAL TUNNEL  2013    Procedure: RELEASE CARPAL TUNNEL;  Left carpal tunnel release  ;  Surgeon: Yovani Gonzalez MD;  Location: [facility]     RELEASE CARPAL TUNNEL Right 10/15/2019    Procedure: Open Carpal Tunnel Release;  Surgeon: Yovani Gonzalez MD;  Location: [facility]       Family History:    Family History   Problem Relation Age of Onset     Cancer Sister         cervical     Cancer Sister         cervical       Social History:  Marital Status:  Single [1]  Social History     Tobacco Use     Smoking status: Light Smoker     Types: Cigarettes     Smokeless tobacco: Never     Tobacco comments:     one pack lasts a week   Vaping Use     Vaping status: Former   Substance Use Topics     Alcohol use: Not Currently     Comment: OCC., few sips of wine 9/3/19     Drug use: Not Currently     Types: Methamphetamines     Comment: smokes, not IV        Medications:    ondansetron (ZOFRAN ODT) 4 MG ODT tab          Review of Systems  As mentioned above in the history present illness. All other systems were reviewed and are negative.    Physical Exam   BP: (!) 157/92  Pulse: 101  Temp: 97.4  F (36.3  C)  Resp: 20  Height: 160 cm (5' 3\")  Weight: 68 kg (150 lb)  SpO2: 97 %      Physical Exam  Vitals and nursing note reviewed.   Constitutional:       " General: She is not in acute distress.     Appearance: Normal appearance. She is well-developed. She is not ill-appearing, toxic-appearing or diaphoretic.   HENT:      Head: Normocephalic and atraumatic.      Mouth/Throat:      Mouth: Mucous membranes are moist.      Pharynx: No oropharyngeal exudate or posterior oropharyngeal erythema.   Eyes:      General: No scleral icterus.        Right eye: No discharge.         Left eye: No discharge.      Conjunctiva/sclera: Conjunctivae normal.   Cardiovascular:      Rate and Rhythm: Regular rhythm. Tachycardia present.      Heart sounds: Normal heart sounds. No murmur heard.     No friction rub.      Comments: Hr 101  Pulmonary:      Effort: Pulmonary effort is normal. No respiratory distress.      Breath sounds: Normal breath sounds. No stridor. No wheezing or rales.   Chest:      Chest wall: No tenderness.   Abdominal:      General: Bowel sounds are normal. There is no distension.      Palpations: Abdomen is soft.      Tenderness: There is no abdominal tenderness. There is no right CVA tenderness, left CVA tenderness or guarding.   Musculoskeletal:      Cervical back: Normal range of motion and neck supple.   Skin:     General: Skin is warm and dry.      Capillary Refill: Capillary refill takes less than 2 seconds.      Coloration: Skin is not pale.      Findings: No erythema or rash.   Neurological:      General: No focal deficit present.      Mental Status: She is alert and oriented to person, place, and time.   Psychiatric:         Mood and Affect: Mood normal.         ED Course                 Procedures      Results for orders placed or performed during the hospital encounter of 04/03/23 (from the past 24 hour(s))   UA with Microscopic reflex to Culture    Specimen: Urine, Midstream   Result Value Ref Range    Color Urine Yellow Colorless, Straw, Light Yellow, Yellow    Appearance Urine Slightly Cloudy (A) Clear    Glucose Urine Negative Negative mg/dL    Bilirubin  Urine Negative Negative    Ketones Urine Negative Negative mg/dL    Specific Gravity Urine 1.019 1.003 - 1.035    Blood Urine Small (A) Negative    pH Urine 7.0 5.0 - 7.0    Protein Albumin Urine Negative Negative mg/dL    Urobilinogen Urine Normal Normal, 2.0 mg/dL    Nitrite Urine Negative Negative    Leukocyte Esterase Urine Negative Negative    Amorphous Crystals Urine Few (A) None Seen /HPF    RBC Urine 9 (H) <=2 /HPF    WBC Urine 2 <=5 /HPF    Squamous Epithelials Urine 3 (H) <=1 /HPF    Narrative    Urine Culture not indicated       Medications   acetaminophen (TYLENOL) tablet 1,000 mg (1,000 mg Oral $Given 4/3/23 1840)   ondansetron (ZOFRAN ODT) ODT tab 4 mg (4 mg Oral $Given 4/3/23 2005)       Assessments & Plan (with Medical Decision Making)     I have reviewed the nursing notes.    I have reviewed the findings, diagnosis, plan and need for follow up with the patient.    58-year-old female presenting to the emergency department with headache, nausea, vomiting, diarrhea, back pain and concern for possible urinary tract infection.  Patient states when she has back pain and a headache and nausea that seems consistent with her urinary tract infections.  Patient denying dysuria, hematuria, flank pain.  On exam patient has normal bowel sounds.  Patient reports during exam that she had been experiencing the vomiting and the diarrhea yesterday and is feeling much better today.  Reviewed with patient her urinalysis is showing no signs of acute urinary tract infection.  Offered IV fluids, medication for nausea.  Patient states she will take the nausea medicine and requested a prescription for nausea medicine.  Recommend pushing fluids and care for nausea vomiting and diarrhea and worrisome reasons to return.  patient verbalized understanding and is discharged in stable condition..      Discharge Medication List as of 4/3/2023  8:06 PM      START taking these medications    Details   ondansetron (ZOFRAN ODT) 4 MG ODT  tab Take 1 tablet (4 mg) by mouth every 6 hours as needed for nausea or vomiting, Disp-10 tablet, R-0, E-Prescribe             Final diagnoses:   Nausea   Vomiting and diarrhea       4/3/2023   [facility] EMERGENCY DEPT     Romeo, Lauryn Zapata, ROBERTA CNP  04/03/23 [id number]

## 2023-04-04 NOTE — DISCHARGE INSTRUCTIONS
You may take Tylenol 1000 mg every 6 hours as needed you may take Zofran 1 tablet by mouth every 6 hours as needed.  I recommend clear liquids tonight.  Tomorrow morning if you are feeling better you may begin a bland diet.  Please follow-up with primary care if persistent loose stools.  Return to the emergency department if you have uncontrolled abdominal pain, mental confusion, worst headache of your life.

## 2023-04-05 LAB — BACTERIA UR CULT: NORMAL

## 2023-04-05 NOTE — RESULT ENCOUNTER NOTE
Final urine culture report is negative.  Adult Negative Urine culture parameters per protocol: Any # Urogenital single or mixed organism, <10,000 col/ml single organism (cath/midstream), and > 3 organisms (No susceptibilities performed).  [facility] Emergency Dept discharge antibiotic prescribed (If applicable): None  Treatment recommendations per [facility] ED Lab Result Urine Culture protocol.

## 2023-04-13 ENCOUNTER — OFFICE VISIT (OUTPATIENT)
Dept: FAMILY MEDICINE | Facility: OTHER | Age: 58
End: 2023-04-13
Payer: COMMERCIAL

## 2023-04-13 VITALS
TEMPERATURE: 98.8 F | SYSTOLIC BLOOD PRESSURE: 122 MMHG | HEIGHT: 64 IN | WEIGHT: 177 LBS | BODY MASS INDEX: 30.22 KG/M2 | RESPIRATION RATE: 20 BRPM | DIASTOLIC BLOOD PRESSURE: 72 MMHG | HEART RATE: 99 BPM | OXYGEN SATURATION: 97 %

## 2023-04-13 DIAGNOSIS — M10.471 ACUTE GOUT DUE TO OTHER SECONDARY CAUSE INVOLVING TOE OF RIGHT FOOT: ICD-10-CM

## 2023-04-13 DIAGNOSIS — M21.611 BUNION, RIGHT: Primary | ICD-10-CM

## 2023-04-13 PROCEDURE — 99214 OFFICE O/P EST MOD 30 MIN: CPT | Performed by: PHYSICIAN ASSISTANT

## 2023-04-13 RX ORDER — PREDNISONE 20 MG/1
TABLET ORAL
Qty: 11 TABLET | Refills: 0 | Status: SHIPPED | OUTPATIENT
Start: 2023-04-13 | End: 2023-11-07

## 2023-04-13 ASSESSMENT — PAIN SCALES - GENERAL: PAINLEVEL: NO PAIN (0)

## 2023-04-13 NOTE — PROGRESS NOTES
"  Assessment & Plan     Bunion, right  Acute gout due to other secondary cause involving toe of right foot  Patient reports pain in the 1st MTP joint of the right foot for the past month. She cannot any injury or change in activity which may have caused the pain. Pain is worse with flexion and walking. She has found that constrictive footwear such as high heels are not able to be worn. She reports a diet high in purines. On exam the joint is warm to touch and tender to palpation. Pain with AROM and PROM of flexion. Discussed suspicion for gout and irritation over existing bunion. I will have her complete prednisone taper. If she does not improve as expected she will reach out. Reference material attached to AVS.   - predniSONE (DELTASONE) 20 MG tablet; 2 tabs daily x 3 days, then 1 tab daily x 3 days, then 1/2 tab daily x 3 days.    [facility] JOHNY Beavers is a 58 year old, presenting for the following health issues:  Musculoskeletal Problem        4/13/2023     3:30 PM   Additional Questions   Roomed by Mireille BOB   Accompanied by self     Musculoskeletal Problem    History of Present Illness       Headaches:   Since the patient's last clinic visit, headaches are: worsened  The patient is getting headaches:  Everyday  She is not able to do normal daily activities when she has a migraine.  The patient is taking the following rescue/relief medications:  Tylenol   Patient states \"I get no relief\" from the rescue/relief medications.   The patient is taking the following medications to prevent migraines:  No medications to prevent migraines  In the past 4 weeks, the patient has gone to an Urgent Care or Emergency Room 1 time times due to headaches.    Reason for visit:  Follow up on heart problems and on my right foot my big toe I make an excruciating pain  Symptom onset:  1-2 weeks ago  Symptoms include:  I cannot bend. My big toe is very painful and I'm shortness " "of breath and feels like my heart skips a beat  Symptom intensity:  Moderate  Symptom progression:  Worsening  Had these symptoms before:  No    She eats 2-3 servings of fruits and vegetables daily.She consumes 2 sweetened beverage(s) daily.She exercises with enough effort to increase her heart rate 10 to 19 minutes per day.  She exercises with enough effort to increase her heart rate 3 or less days per week.   She is taking medications regularly.       Pain History:  When did you first notice your pain? 1 month ago   Have you seen anyone else for your pain? No  How has your pain affected your ability to work? Pain does not limit ability to work   What type of work do you or did you do? Not working right now but does [employer] work.  Where in your body do you have pain? big toe right foot     Review of Systems   Constitutional, HEENT, cardiovascular, pulmonary, gi and gu systems are negative, except as otherwise noted.      Objective    /72   Pulse 99   Temp 98.8  F (37.1  C) (Temporal)   Resp 20   Ht 1.63 m (5' 4.17\")   Wt 80.3 kg (177 lb)   LMP 04/20/2016 (Approximate)   SpO2 97%   BMI 30.22 kg/m    Body mass index is 30.22 kg/m .  Physical Exam   GENERAL: healthy, alert and no distress  RESP: lungs clear to auscultation - no rales, rhonchi or wheezes  CV: regular rate and rhythm, normal S1 S2, no S3 or S4, no murmur, click or rub, no peripheral edema and peripheral pulses strong  MS: erythema around 1st MTP joint of the right foot, pain with flexion during AROM and PROM of the right big toe. Bunion present along the 1st MTP joint of the right foot  PSYCH: mentation appears normal, affect normal/bright                "

## 2023-04-13 NOTE — LETTER
My Depression Action Plan  Name: Shelley Lew   Date of Birth 1965  Date: 4/13/2023    My doctor: Ashvin Sherman   My clinic: [address]  [facility] [address]  [phone]            GREEN    ZONE   Good Control    What it looks like:   Things are going generally well. You have normal ups and downs. You may even feel depressed from time to time, but bad moods usually last less than a day.   What you need to do:  Continue to care for yourself (see self care plan)  Check your depression survival kit and update it as needed  Follow your physician s recommendations including any medication.  Do not stop taking medication unless you consult with your physician first.             YELLOW         ZONE Getting Worse    What it looks like:   Depression is starting to interfere with your life.   It may be hard to get out of bed; you may be starting to isolate yourself from others.  Symptoms of depression are starting to last most all day and this has happened for several days.   You may have suicidal thoughts but they are not constant.   What you need to do:     Call your care team. Your response to treatment will improve if you keep your care team informed of your progress. Yellow periods are signs an adjustment may need to be made.     Continue your self-care.  Just get dressed and ready for the day.  Don't give yourself time to talk yourself out of it.    Talk to someone in your support network.    Open up your Depression Self-Care Plan/Wellness Kit.             RED    ZONE Medical Alert - Get Help    What it looks like:   Depression is seriously interfering with your life.   You may experience these or other symptoms: You can t get out of bed most days, can t work or engage in other necessary activities, you have trouble taking care of basic hygiene, or basic responsibilities, thoughts of suicide or death that will not go away, self-injurious  behavior.     What you need to do:  Call your care team and request a same-day appointment. If they are not available (weekends or after hours) call your local crisis line, emergency room or 911.          Depression Self-Care Plan / Wellness Kit    Many people find that medication and therapy are helpful treatments for managing depression. In addition, making small changes to your everyday life can help to boost your mood and improve your wellbeing. Below are some tips for you to consider. Be sure to talk with your medical provider and/or behavioral health consultant if your symptoms are worsening or not improving.     Sleep   Sleep hygiene  means all of the habits that support good, restful sleep. It includes maintaining a consistent bedtime and wake time, using your bedroom only for sleeping or sex, and keeping the bedroom dark and free of distractions like a computer, smartphone, or television.     Develop a Healthy Routine  Maintain good hygiene. Get out of bed in the morning, make your bed, brush your teeth, take a shower, and get dressed. Don t spend too much time viewing media that makes you feel stressed. Find time to relax each day.    Exercise  Get some form of exercise every day. This will help reduce pain and release endorphins, the  feel good  chemicals in your brain. It can be as simple as just going for a walk or doing some gardening, anything that will get you moving.      Diet  Strive to eat healthy foods, including fruits and vegetables. Drink plenty of water. Avoid excessive sugar, caffeine, alcohol, and other mood-altering substances.     Stay Connected with Others  Stay in touch with friends and family members.    Manage Your Mood  Try deep breathing, massage therapy, biofeedback, or meditation. Take part in fun activities when you can. Try to find something to smile about each day.     Psychotherapy  Be open to working with a therapist if your provider recommends it.     Medication  Be sure to  take your medication as prescribed. Most anti-depressants need to be taken every day. It usually takes several weeks for medications to work. Not all medicines work for all people. It is important to follow-up with your provider to make sure you have a treatment plan that is working for you. Do not stop your medication abruptly without first discussing it with your provider.    Crisis Resources   These hotlines are for both adults and children. They and are open 24 hours a day, 7 days a week unless noted otherwise.    National Suicide Prevention Lifeline   988 or 0-666-356-GNPR (7610)    Crisis Text Line    www.crisistextline.org  Text HOME to 893374 from anywhere in the [place], anytime, about any type of crisis. A live, trained crisis counselor will receive the text and respond quickly.    [facility] Lifeline for LGBTQ Youth  A national crisis intervention and suicide lifeline for LGBTQ youth under 25. Provides a safe place to talk without judgement. Call 1-637.733.9656; text START to 156958 or visit www.thetrevorproject.org to talk to a trained counselor.    For [facility] crisis numbers, visit the [facility] website at:  https://[place].gov/dhs/people-we-serve/adults/health-care/mental-health/resources/crisis-contacts.jsp

## 2023-06-26 NOTE — H&P
"55 year old year old female here for colonoscopy for screening.    Patient Active Problem List   Diagnosis     CARDIOVASCULAR SCREENING; LDL GOAL LESS THAN 160     CTS (carpal tunnel syndrome)     Health Care Home     Spinal stenosis of lumbar region with neurogenic claudication     Tobacco use disorder     Hyperlipidemia LDL goal <130     H/O drug abuse (H)     Chronic hepatitis C without hepatic coma (H)       Past Medical History:   Diagnosis Date      (normal spontaneous vaginal delivery)     X2     PONV (postoperative nausea and vomiting)      Pyelonephritis, acute        Past Surgical History:   Procedure Laterality Date     CHOLECYSTECTOMY       LAMINECTOMY LUMBAR POSTERIOR MICROSCOPIC TWO LEVELS N/A 2017    Procedure: LAMINECTOMY LUMBAR POSTERIOR MICROSCOPIC TWO LEVELS;  Surgeon: Nehemias Bangura MD;  Location: [facility] OR     LAPAROSCOPY,TUBAL CAUTERY       RELEASE CARPAL TUNNEL  2013    Procedure: RELEASE CARPAL TUNNEL;  Left carpal tunnel release  ;  Surgeon: Yovani Gonzalez MD;  Location: [facility] OR     RELEASE CARPAL TUNNEL Right 10/15/2019    Procedure: Open Carpal Tunnel Release;  Surgeon: Yovani Gonzalez MD;  Location: [facility] OR       @[facility]@     current outpatient medications on file.       Allergies   Allergen Reactions     Percocet [Oxycodone-Acetaminophen] Nausea and Vomiting     Nausea vomiting and head swimming     Vicodin [Hydrocodone-Acetaminophen] Nausea and Vomiting       Pt reports that she has been smoking cigarettes. She has never used smokeless tobacco. She reports previous alcohol use. She reports current drug use. Drug: Methamphetamines.    Exam:  /86 (BP Location: Left arm)   Pulse 81   Temp 97.6  F (36.4  C) (Oral)   Resp 16   Ht 1.626 m (5' 4\")   Wt 74.8 kg (165 lb)   LMP 2016 (Approximate)   SpO2 100%   BMI 28.32 kg/m      Awake, Alert OX3  Lungs - CTA bilaterally  CV - RRR, no murmurs, distal pulses intact  Abd - soft, non-distended, non-tender, " +BS  Extr - No cyanosis or edema    A/P 55 year old year old female in need of colonoscopy for screening. Risks, benefits, alternatives, and complications were discussed including the possibility of perforation and the patient agreed to proceed    Shawn Amor MD      Nsaids Pregnancy And Lactation Text: These medications are considered safe up to 30 weeks gestation. It is excreted in breast milk.

## 2023-09-25 ENCOUNTER — PATIENT OUTREACH (OUTPATIENT)
Dept: CARE COORDINATION | Facility: CLINIC | Age: 58
End: 2023-09-25
Payer: COMMERCIAL

## 2023-11-08 ENCOUNTER — PATIENT OUTREACH (OUTPATIENT)
Dept: CARE COORDINATION | Facility: CLINIC | Age: 58
End: 2023-11-08
Payer: COMMERCIAL

## 2023-12-03 ENCOUNTER — HEALTH MAINTENANCE LETTER (OUTPATIENT)
Age: 58
End: 2023-12-03

## 2023-12-05 ASSESSMENT — ANXIETY QUESTIONNAIRES
7. FEELING AFRAID AS IF SOMETHING AWFUL MIGHT HAPPEN: NEARLY EVERY DAY
4. TROUBLE RELAXING: NEARLY EVERY DAY
IF YOU CHECKED OFF ANY PROBLEMS ON THIS QUESTIONNAIRE, HOW DIFFICULT HAVE THESE PROBLEMS MADE IT FOR YOU TO DO YOUR WORK, TAKE CARE OF THINGS AT HOME, OR GET ALONG WITH OTHER PEOPLE: EXTREMELY DIFFICULT
6. BECOMING EASILY ANNOYED OR IRRITABLE: NEARLY EVERY DAY
1. FEELING NERVOUS, ANXIOUS, OR ON EDGE: NEARLY EVERY DAY
2. NOT BEING ABLE TO STOP OR CONTROL WORRYING: NEARLY EVERY DAY
GAD7 TOTAL SCORE: 20
5. BEING SO RESTLESS THAT IT IS HARD TO SIT STILL: MORE THAN HALF THE DAYS
3. WORRYING TOO MUCH ABOUT DIFFERENT THINGS: NEARLY EVERY DAY
GAD7 TOTAL SCORE: 20

## 2023-12-06 ENCOUNTER — OFFICE VISIT (OUTPATIENT)
Dept: FAMILY MEDICINE | Facility: OTHER | Age: 58
End: 2023-12-06
Payer: COMMERCIAL

## 2023-12-06 ENCOUNTER — PATIENT OUTREACH (OUTPATIENT)
Dept: CARE COORDINATION | Facility: CLINIC | Age: 58
End: 2023-12-06

## 2023-12-06 VITALS
TEMPERATURE: 97.8 F | OXYGEN SATURATION: 97 % | BODY MASS INDEX: 28.34 KG/M2 | DIASTOLIC BLOOD PRESSURE: 72 MMHG | WEIGHT: 166 LBS | HEART RATE: 100 BPM | RESPIRATION RATE: 16 BRPM | SYSTOLIC BLOOD PRESSURE: 128 MMHG

## 2023-12-06 DIAGNOSIS — F29 PSYCHOSIS, UNSPECIFIED PSYCHOSIS TYPE (H): ICD-10-CM

## 2023-12-06 DIAGNOSIS — R20.0 LEFT LEG NUMBNESS: ICD-10-CM

## 2023-12-06 DIAGNOSIS — Z98.890 HISTORY OF LUMBAR LAMINECTOMY: ICD-10-CM

## 2023-12-06 DIAGNOSIS — B18.2 CHRONIC HEPATITIS C WITHOUT HEPATIC COMA (H): ICD-10-CM

## 2023-12-06 DIAGNOSIS — Z12.31 ENCOUNTER FOR SCREENING MAMMOGRAM FOR BREAST CANCER: ICD-10-CM

## 2023-12-06 DIAGNOSIS — F32.1 CURRENT MODERATE EPISODE OF MAJOR DEPRESSIVE DISORDER, UNSPECIFIED WHETHER RECURRENT (H): Primary | ICD-10-CM

## 2023-12-06 DIAGNOSIS — M54.16 LUMBAR RADICULOPATHY: ICD-10-CM

## 2023-12-06 DIAGNOSIS — F17.200 NICOTINE DEPENDENCE, UNCOMPLICATED, UNSPECIFIED NICOTINE PRODUCT TYPE: ICD-10-CM

## 2023-12-06 DIAGNOSIS — Z87.891 PERSONAL HISTORY OF TOBACCO USE: ICD-10-CM

## 2023-12-06 PROCEDURE — 99214 OFFICE O/P EST MOD 30 MIN: CPT | Performed by: NURSE PRACTITIONER

## 2023-12-06 RX ORDER — CYCLOBENZAPRINE HCL 5 MG
5 TABLET ORAL DAILY PRN
Qty: 30 TABLET | Refills: 0 | Status: SHIPPED | OUTPATIENT
Start: 2023-12-06 | End: 2024-07-26

## 2023-12-06 RX ORDER — NICOTINE 21 MG/24HR
1 PATCH, TRANSDERMAL 24 HOURS TRANSDERMAL EVERY 24 HOURS
Qty: 42 PATCH | Refills: 0 | Status: SHIPPED | OUTPATIENT
Start: 2023-12-06 | End: 2024-01-17

## 2023-12-06 ASSESSMENT — PAIN SCALES - GENERAL: PAINLEVEL: MODERATE PAIN (4)

## 2023-12-06 NOTE — PATIENT INSTRUCTIONS
Nicotine Transdermal System   Habitrol, Nicoderm C-Q    Uses  For quitting smoking.    Instructions  DO NOT take this medicine by mouth.    Avoid placing the patch near the breast.    Remove the patch after 24 hours.    Keep the medicine at room temperature. Avoid heat and direct light.    This patch should not be cut.    Wash your hands before and after handling this medicine.    Remove old patch before applying new one. Change the location of the new patch.    If you have vivid dreams or trouble sleeping, you may remove the patch before going to sleep.    Ask your doctor or pharmacist about locations on your body where this patch can be used.    Remove the plastic liner that protects the sticky side of the patch before applying to the skin.    Be sure the area of skin is clean and dry before putting on a new patch.    Apply the patch to a clean, dry, hairless area.    Press the patch firmly for a few seconds to make sure it stays in place.    After removing the patch, fold it together and discard it out of reach of children and pets.    Please ask your doctor or pharmacist how you can safely dispose of used patches.    If the skin under the patch becomes irritated, remove the patch. Do not apply a new patch to the area until the skin feels better.    To avoid irritating your skin, use a different location for a new patch.    Apply the patch only to normal looking skin. Avoid areas of the skin that are red, have scrapes, or damaged.    If the patch falls off, apply a new a patch on a different location of the body.    Please tell your doctor and pharmacist about all the medicines you take. Include both prescription and over-the-counter medicines. Also tell them about any vitamins, herbal medicines, or anything else you take for your health.    If you need to stop this medicine, your doctor may wish to gradually reduce the dosage before stopping.    Do not use more than 1 patch at any one time.    Cautions  Tell  your doctor and pharmacist if you ever had an allergic reaction to a medicine. Symptoms of an allergic reaction can include trouble breathing, skin rash, itching, swelling, or severe dizziness.    Do not use the medication any more than instructed.    Avoid smoking while on this medicine. Smoking may increase your risk for stroke, heart attack, blood clots, high blood pressure, and other diseases of the heart and blood vessels.    Tell the doctor or pharmacist if you are pregnant, planning to be pregnant, or breastfeeding.    Ask your pharmacist if this medicine can interact with any of your other medicines. Be sure to tell them about all the medicines you take.    Please tell all your doctors and dentists that you are on this medicine before they provide care.    Side Effects  The following is a list of some common side effects from this medicine. Please speak with your doctor about what you should do if you experience these or other side effects.    skin irritation where medicine is applied    If you have any of the following side effects, you may be getting too much medicine. Please contact your doctor to let them know about these side effects.    diarrhea  dizziness  nausea  rapid heartbeat  vomiting    A few people may have an allergic reaction to this medicine. Symptoms can include difficulty breathing, skin rash, itching, swelling, or severe dizziness. If you notice any of these symptoms, seek medical help quickly.    Extra  Please speak with your doctor, nurse, or pharmacist if you have any questions about this medicine.      [email or web address]  IMPORTANT NOTE: This document tells you briefly how to take your medicine, but it does not tell you all there is to know about it. Your doctor or pharmacist may give you other documents about your medicine. Please talk to them if you have any questions. Always follow their advice. There is a more complete description of this medicine  available in English. Scan this code on your smartphone or tablet or use the web address below. You can also ask your pharmacist for a printout. If you have any questions, please ask your pharmacist.   2021 [employer].      5540-6249 [employer]. All rights reserved. This information is not intended as a substitute for professional medical care. Always follow your healthcare professional's instructions.    Lung Cancer Screening   Frequently Asked Questions  If you are at high-risk for lung cancer, getting screened with low-dose computed tomography (LDCT) every year can help save your life. This handout offers answers to some of the most common questions about lung cancer screening. If you have other questions, please call [phone] ([phone]).     What is it?  Lung cancer screening uses special X-ray technology to create an image of your lung tissue. The exam is quick and easy and takes less than 10 seconds. We don t give you any medicine or use any needles. You can eat before and after the exam. You don t need to change your clothes as long as the clothing on your chest doesn t contain metal. But, you do need to be able to hold your breath for at least 6 seconds during the exam.    What is the goal of lung cancer screening?  The goal of lung cancer screening is to save lives. Many times, lung cancer is not found until a person starts having physical symptoms. Lung cancer screening can help detect lung cancer in the earliest stages when it may be easier to treat.    Who should be screened for lung cancer?  We suggest lung cancer screening for anyone who is at high-risk for lung cancer. You are in the high-risk group if you:      are between the ages of 55 and 79, and    have smoked at least 1 pack of cigarettes a day for 20 or more years, and    still smoke or have quit within the past 15 years.    However, if you have a new cough or shortness of breath, you should talk to your  doctor before being screened.    Why does it matter if I have symptoms?  Certain symptoms can be a sign that you have a condition in your lungs that should be checked and treated by your doctor. These symptoms include fever, chest pain, a new or changing cough, shortness of breath that you have never felt before, coughing up blood or unexplained weight loss. Having any of these symptoms can greatly affect the results of lung cancer screening.       Should all smokers get an LDCT lung cancer screening exam?  It depends. Lung cancer screening is for a very specific group of men and women who have a history of heavy smoking over a long period of time (see  Who should be screened for lung cancer  above).  I am in the high-risk group, but have been diagnosed with cancer in the past. Is LDCT lung cancer screening right for me?  In some cases, you should not have LDCT lung screening, such as when your doctor is already following your cancer with CT scan studies. Your doctor will help you decide if LDCT lung screening is right for you.  Do I need to have a screening exam every year?  Yes. If you are in the high-risk group described earlier, you should get an LDCT lung cancer screening exam every year until you are 79, or are no longer willing or able to undergo screening and possible procedures to diagnose and treat lung cancer.  How effective is LDCT at preventing death from lung cancer?  Studies have shown that LDCT lung cancer screening can lower the risk of death from lung cancer by 20 percent in people who are at high-risk.  What are the risks?  There are some risks and limitations of LDCT lung cancer screening. We want to make sure you understand the risks and benefits, so please let us know if you have any questions. Your doctor may want to talk with you more about these risks.    Radiation exposure: As with any exam that uses radiation, there is a very small increased risk of cancer. The amount of radiation in LDCT  is small--about the same amount a person would get from a mammogram. Your doctor orders the exam when he or she feels the potential benefits outweigh the risks.    False negatives: No test is perfect, including LDCT. It is possible that you may have a medical condition, including lung cancer, that is not found during your exam. This is called a false negative result.    False positives and more testing: LDCT very often finds something in the lung that could be cancer, but in fact is not. This is called a false positive result. False positive tests often cause anxiety. To make sure these findings are not cancer, you may need to have more tests. These tests will be done only if you give us permission. Sometimes patients need a treatment that can have side effects, such as a biopsy. For more information on false positives, see  What can I expect from the results?     Findings not related to lung cancer: Your LDCT exam also takes pictures of areas of your body next to your lungs. In a very small number of cases, the CT scan will show an abnormal finding in one of these areas, such as your kidneys, adrenal glands, liver or thyroid. This finding may not be serious, but you may need more tests. Your doctor can help you decide what other tests you may need, if any.  What can I expect from the results?  About 1 out of 4 LDCT exams will find something that may need more tests. Most of the time, these findings are lung nodules. Lung nodules are very small collections of tissue in the lung. These nodules are very common, and the vast majority--more than 97 percent--are not cancer (benign). Most are normal lymph nodes or small areas of scarring from past infections.  But, if a small lung nodule is found to be cancer, the cancer can be cured more than 90 percent of the time. To know if the nodule is cancer, we may need to get more images before your next yearly screening exam. If the nodule has suspicious features (for example,  it is large, has an odd shape or grows over time), we will refer you to a specialist for further testing.  Will my doctor also get the results?  Yes. Your doctor will get a copy of your results.  Is it okay to keep smoking now that there s a cancer screening exam?  No. Tobacco is one of the strongest cancer-causing agents. It causes not only lung cancer, but other cancers and cardiovascular (heart) diseases as well. The damage caused by smoking builds over time. This means that the longer you smoke, the higher your risk of disease. While it is never too late to quit, the sooner you quit, the better.  Where can I find help to quit smoking?  The best way to prevent lung cancer is to stop smoking. If you have already quit smoking, congratulations and keep it up! For help on quitting smoking, please call [employer] at 4-559-QUITNOW (1-947.688.5386) or the American Cancer Society at 1-110.720.7659 to find local resources near you.  One-on-one health coaching:  If you d prefer to work individually with a health care provider on tobacco cessation, we offer:      Medication Therapy Management:  Our specially trained pharmacists work closely with you and your doctor to help you quit smoking.  Call [phone] or [phone] (toll free).

## 2023-12-06 NOTE — COMMUNITY RESOURCES LIST (ENGLISH)
12/06/2023   [facility]  N/A  For questions about this resource list or additional care needs, please contact your primary care clinic or care manager.  Phone: [phone]   Email: N/A   Address: [address] [place] [id number]   Hours: N/A        Financial Stability       Rent and mortgage payment assistance  1  [facility] ([facility]) [facility] Office - Emergency Housing Assistance - Rent and mortgage payment assistance Distance: 10.54 miles      In-Person   [address] [place] [address]  Language: English  Hours: Mon - Fri 8:00 AM - 4:30 PM  Fees: Free   Phone: (387) 123-7346 Email: farhan@[employer] Website: https://www.[employer]/agency-information     2  [facility] - Emergency Housing Assistance - Rent and mortgage payment assistance Distance: 17.64 miles      In-Person   [address] [place] [address]  Language: English  Hours: Mon - Fri 6:00 AM - 6:30 PM  Fees: Free   Phone: (706) 767-3216 Email: farhan@[employer] Website: http://www.[employer]     Utility payment assistance  3  [facility] Distance: 8.51 miles      In-Person, Phone/Virtual   [address] [place] [address]  Language: English  Hours: Mon - Fri 8:00 AM - 4:30 PM  Fees: Free   Phone: (757) 371-5910 Email: carlos@[facility]. Website: [email or web address]://www.[facility]./170/Family-Services     4  [facility] ([facility]) [facility] Office - Energy Assistance Program Distance: 10.54 miles      Phone/Virtual   [address] [place] [address]  Language: English  Hours: Mon - Fri 8:00 AM - 4:30 PM  Fees: Free   Phone: (371) 758-4151 Email: farhan@[employer] Website: https://www.[employer].[facility]/agency-information          Food and Nutrition       Food pantry  5  [facility] ([facility] Distance: 6.39 miles      Pickup   [address] [place] [address]  Language: English  Hours: Tue 2:30 PM - 5:00 PM  Fees: Free   Phone: (883) 863-5296 Website: http://[employer]/     6  [employer] Distance: 10.33 miles      In-Person   [address] [place] [address]  Language: English  Hours: Sat 8:30 AM - 11:30 AM  Fees: Free   Phone: (837) 433-5761 Email: contact@[employer] Website: http://[employer]/     SNAP application assistance  7  [facility] Distance: 8.51 miles      In-Person, Phone/Virtual   [address] [place] [address]  Language: English  Hours: Mon - Fri 8:00 AM - 4:30 PM  Fees: Free   Phone: (623) 596-1572 Email: carlos@[facility]. Website: https://www.[facility]./170/Family-Services     8  [facility] [place] ([facility]) - [place] Office Distance: 10.54 miles      In-Person, Phone/Virtual   [address] [place] [address]  Language: English  Hours: Mon - Fri 8:00 AM - 4:30 PM  Fees: Free   Phone: (123) 374-8157 Email: farhan@[employer] Website: https://www.[facility].org/agency-information          Important Numbers & Websites       Emergency Services   911  City Services   311  Poison Control   (965) 230-1961  Suicide Prevention Lifeline   (772) 595-1135 (TALK)  Child Abuse Hotline   (533) 944-5929 (4-A-Child)  Sexual Assault Hotline   (714) 578-2179 (HOPE)  National Runaway Safeline   (293) 877-8277 (RUNAWAY)  All-Options Talkline   (828) 815-2691  Substance Abuse Referral   (851) 252-4178 (HELP)

## 2023-12-06 NOTE — Clinical Note
Saw patient today, she thinks that last time she saw you that you did a steroid injection in her shoulder. I could not specifically find this and wasn't sure if you did it. She was looking for this today. If you do not no worries I can direct her to orthopedic, but I wanted to check with you first.     ROBERTA Toro CNP Questions or concerns please feel free to send me a [employer] message or call me Phone : [phone]

## 2023-12-06 NOTE — PROGRESS NOTES
Assessment & Plan     Current moderate episode of major depressive disorder, unspecified whether recurrent (H)  Psychosis, unspecified psychosis type (H)    - Adult Mental Health  Referral; Future  Unstable, patient notes she has been having a hard time since she lost her significant other last year. She is not taking the Cymbalta. She also notes that she would like to talk to someone about her psychosis. She notes she is starting to hear conversations and having some paranoia. We discussed possibly starting a daily medication for her mental health, for now she would like to have a psychiatric evaluation and I placed this for her today.      History of lumbar laminectomy  Patient has had worsening low back pain, addressed last year with PCP recommend possibly needing additional imaging and or PT. Patient notes it has worsened and caused some numbness in her toes and pain down her left leg. Positive left leg straight leg. Given her history of presenting symptoms I recommend MRI, could also place PT orders as well as neurosurgery orders depending on what is found.   - MR Lumbar Spine w/o & w Contrast; Future  - cyclobenzaprine (FLEXERIL) 5 MG tablet; Take 1 tablet (5 mg) by mouth daily as needed for muscle spasms    Lumbar radiculopathy  As noted above   - MR Lumbar Spine w/o & w Contrast; Future  - cyclobenzaprine (FLEXERIL) 5 MG tablet; Take 1 tablet (5 mg) by mouth daily as needed for muscle spasms    Left leg numbness  As noted above  No urine or stool incontinence.   - MR Lumbar Spine w/o & w Contrast; Future  - cyclobenzaprine (FLEXERIL) 5 MG tablet; Take 1 tablet (5 mg) by mouth daily as needed for muscle spasms    Chronic hepatitis C without hepatic coma (H)  Decreased dose of flexeril to daily and low dose to due her liver history   - cyclobenzaprine (FLEXERIL) 5 MG tablet; Take 1 tablet (5 mg) by mouth daily as needed for muscle spasms    Nicotine dependence, uncomplicated, unspecified nicotine  "product type  Discussed options, she did not tolerate the chantix.  Would like to try the patches.   Uses about a pack a week now, so will do the 14mg dosing. Certainly if she feels this does not provide coverage she can let me or her PCP know.   Discussed side effects and not using any other nicotine with the patch and cardiovascular risk factors with this.   - nicotine (NICODERM CQ) 14 MG/24HR 24 hr patch; Place 1 patch onto the skin every 24 hours for 42 days  - nicotine (NICODERM CQ) 7 MG/24HR 24 hr patch; Place 1 patch onto the skin every 24 hours for 14 days  - SMOKING CESSATION COUNSELING 3-10 MIN    Encounter for screening mammogram for breast cancer    - MA Screening Digital Bilateral; Future    Personal history of tobacco use    - Prof fee: Shared Decision Making for Lung Cancer Screening  - CT Chest Lung Cancer Scrn Low Dose wo; Future      Would like left shoulder steroid injection- will message PCP as she notes he did a injection awhile back. I am not certain if he does them so will reach out if not then recommend orthopedic.       Nicotine/Tobacco Cessation:  She reports that she has been smoking cigarettes. She has never used smokeless tobacco.  Nicotine/Tobacco Cessation Plan:   Pharmacotherapies : Nicotine patch      BMI:   Estimated body mass index is 28.34 kg/m  as calculated from the following:    Height as of 4/13/23: 1.63 m (5' 4.17\").    Weight as of this encounter: 75.3 kg (166 lb).   Weight management plan: Discussed healthy diet and exercise guidelines    See Patient Instructions    ROBERTA Toro CNP  [facility] is a 58 year old, presenting for the following health issues:  Musculoskeletal Problem and Depression      History of Present Illness       Back Pain:  She presents for follow up of back pain. Patient's back pain is a chronic problem.  Location of back pain:  Right lower back, left lower back, left upper back, left side of neck, " "left buttock and left side of waist  Description of back pain: sharp and stabbing  Back pain spreads: left buttocks, left thigh and left shoulder    Since patient first noticed back pain, pain is: gradually worsening  Does back pain interfere with her job:  Not applicable       Mental Health Follow-up:  Patient presents to follow-up on Depression & Anxiety.Patient's depression since last visit has been:  Worse  The patient is having other symptoms associated with depression.  Patient's anxiety since last visit has been:  Worse  The patient is having other symptoms associated with anxiety.  Any significant life events: job concerns, financial concerns, housing concerns, grief or loss and health concerns  Patient is not feeling anxious or having panic attacks.  Patient has no concerns about alcohol or drug use.    Headaches:   Since the patient's last clinic visit, headaches are: worsened  The patient is getting headaches:  Everyday  She is not able to do normal daily activities when she has a migraine.  The patient is taking the following rescue/relief medications:  No rescue/relief medications   Patient states \"I get no relief\" from the rescue/relief medications.   The patient is taking the following medications to prevent migraines:  No medications to prevent migraines  In the past 4 weeks, the patient has gone to an Urgent Care or Emergency Room 0 times times due to headaches.    Reason for visit:  Tingling in my legs and another shot of cortisone  in my shoulder  ask for handicap sticker  Symptom onset:  3-4 weeks ago  Symptoms include:  My legs tingle sometimes just one leg but most both  Symptom intensity:  Severe  Symptom progression:  Worsening  Had these symptoms before:  No  What makes it worse:  No  What makes it better:  No    She eats 0-1 servings of fruits and vegetables daily.She consumes 1 sweetened beverage(s) daily.She exercises with enough effort to increase her heart rate 9 or less minutes per " day.  She exercises with enough effort to increase her heart rate 3 or less days per week.   She is taking medications regularly.      Low back pain, into left leg and then tingling into her foot.   Gets worse sometimes and depends on her activities  Walking long distance hurts.   Had surgery on her back 6 years ago.   Lumbar laminectomy in 2017   Worsened in the last year  Hard to walk long distance          Had a cortisone shot in her left shoulder and would like to have this done   Has troubles with left arm ROM       Mental health  Feels she is hearing things   Paranoid         Review of Systems       Objective    /72   Pulse 100   Temp 97.8  F (36.6  C) (Temporal)   Resp 16   Wt 75.3 kg (166 lb)   LMP 04/20/2016 (Approximate)   SpO2 97%   BMI 28.34 kg/m    Body mass index is 28.34 kg/m .  Physical Exam  Musculoskeletal:      Left shoulder: Decreased range of motion.      Lumbar back: No tenderness or bony tenderness. Normal range of motion. Positive left straight leg raise test.      Comments: Limping gait    Psychiatric:         Mood and Affect: Affect is tearful.         Speech: Speech normal.         Thought Content: Thought content does not include homicidal ideation. Thought content does not include suicidal plan.            No results found for any visits on 12/06/23.                Lung Cancer Screening Shared Decision Making Visit     Shelley Lew, a 58 year old female, is eligible for lung cancer screening    History   Smoking Status    Light Smoker    Types: Cigarettes   Smokeless Tobacco    Never       I have discussed with patient the risks and benefits of screening for lung cancer with low-dose CT.     The risks include:    radiation exposure: one low dose chest CT has as much ionizing radiation as about 15 chest x-rays, or 6 months of background radiation living in [place]      false positives: most findings/nodules are NOT cancer, but some might still require additional  diagnostic evaluation, including biopsy    over-diagnosis: some slow growing cancers that might never have been clinically significant will be detected and treated unnecessarily     The benefit of early detection of lung cancer is contingent upon adherence to annual screening or more frequent follow up if indicated.     Furthermore, to benefit from screening, Shelley must be willing and able to undergo diagnostic procedures, if indicated. Although no specific guide is available for determining severity of comorbidities, it is reasonable to withhold screening in patients who have greater mortality risk from other diseases.     We did discuss that the best way to prevent lung cancer is to not smoke.    Some patients may value a numeric estimation of lung cancer risk when evaluating if lung cancer screening is right for them, here is one calculator:    ShouldIScreen

## 2023-12-28 ENCOUNTER — MYC MEDICAL ADVICE (OUTPATIENT)
Dept: FAMILY MEDICINE | Facility: OTHER | Age: 58
End: 2023-12-28
Payer: COMMERCIAL

## 2023-12-28 DIAGNOSIS — M25.519 SHOULDER PAIN, UNSPECIFIED CHRONICITY, UNSPECIFIED LATERALITY: Primary | ICD-10-CM

## 2024-01-05 ENCOUNTER — ANCILLARY PROCEDURE (OUTPATIENT)
Dept: GENERAL RADIOLOGY | Facility: CLINIC | Age: 59
End: 2024-01-05
Attending: PEDIATRICS
Payer: COMMERCIAL

## 2024-01-05 ENCOUNTER — OFFICE VISIT (OUTPATIENT)
Dept: ORTHOPEDICS | Facility: CLINIC | Age: 59
End: 2024-01-05
Attending: PHYSICIAN ASSISTANT
Payer: COMMERCIAL

## 2024-01-05 VITALS — HEIGHT: 64 IN | BODY MASS INDEX: 28.34 KG/M2 | WEIGHT: 166 LBS

## 2024-01-05 DIAGNOSIS — M25.519 SHOULDER PAIN, UNSPECIFIED CHRONICITY, UNSPECIFIED LATERALITY: ICD-10-CM

## 2024-01-05 DIAGNOSIS — M25.512 CHRONIC LEFT SHOULDER PAIN: ICD-10-CM

## 2024-01-05 DIAGNOSIS — G89.29 CHRONIC LEFT SHOULDER PAIN: ICD-10-CM

## 2024-01-05 DIAGNOSIS — M19.012 ARTHRITIS OF LEFT SHOULDER REGION: Primary | ICD-10-CM

## 2024-01-05 PROCEDURE — 99204 OFFICE O/P NEW MOD 45 MIN: CPT | Mod: 25 | Performed by: PEDIATRICS

## 2024-01-05 PROCEDURE — 20610 DRAIN/INJ JOINT/BURSA W/O US: CPT | Mod: LT | Performed by: PEDIATRICS

## 2024-01-05 PROCEDURE — 73030 X-RAY EXAM OF SHOULDER: CPT | Mod: TC | Performed by: RADIOLOGY

## 2024-01-05 RX ORDER — LIDOCAINE HYDROCHLORIDE 10 MG/ML
2 INJECTION, SOLUTION INFILTRATION; PERINEURAL
Status: SHIPPED | OUTPATIENT
Start: 2024-01-05

## 2024-01-05 RX ORDER — TRIAMCINOLONE ACETONIDE 40 MG/ML
40 INJECTION, SUSPENSION INTRA-ARTICULAR; INTRAMUSCULAR
Status: SHIPPED | OUTPATIENT
Start: 2024-01-05

## 2024-01-05 RX ADMIN — LIDOCAINE HYDROCHLORIDE 2 ML: 10 INJECTION, SOLUTION INFILTRATION; PERINEURAL at 15:37

## 2024-01-05 RX ADMIN — TRIAMCINOLONE ACETONIDE 40 MG: 40 INJECTION, SUSPENSION INTRA-ARTICULAR; INTRAMUSCULAR at 15:37

## 2024-01-05 NOTE — PROGRESS NOTES
ASSESSMENT & PLAN    Ruthann was seen today for pain.    Diagnoses and all orders for this visit:    Arthritis of left shoulder region  -     Large Joint Injection/Arthocentesis: L subacromial bursa    Chronic left shoulder pain  -     Orthopedic  Referral  -     XR Shoulder Left G/E 3 Views; Future  -     Large Joint Injection/Arthocentesis: L subacromial bursa      This issue is acute on chronic and Worsening.        ICD-10-CM    1. Arthritis of left shoulder region  M19.012 Large Joint Injection/Arthocentesis: L subacromial bursa      2. Chronic left shoulder pain  M25.512 Orthopedic  Referral    G89.29 XR Shoulder Left G/E 3 Views     Large Joint Injection/Arthocentesis: L subacromial bursa           Patient request a cortisone injection today.  Reviewed the potential diagnosis (flare of arthritis, rotator cuff tear, rotator cuff tendinosis or weakness) and discussed the utility of corticosteroid injections (pain relief only).  Stressed the importance of physical therapy to strengthen shoulder and increased liklihood of pain returning if injection is not coupled with therapy.    Plan:  - Today's Plan of Care:  Steroid injection of the left shoulder: subacromial space was performed today in clinic  Icing for the next 1-2 days may be helpful for pain. Injection may take 10-14 days to see the full effect.    Home Exercise Program    -We also discussed other future treatment options:  MRI Left Shoulder  US guided glenohumeral joint injection    Follow Up: as needed    Concerning signs and symptoms were reviewed and all questions were answered at this time.    Thanks for the opportunity to participate in the care of this patient, I will keep you updated on their progress.    CC: Ashvin Fuentes MD [facility]  Sports Medicine Physician  [facility]      -----  Chief Complaint   Patient presents with    Left Shoulder - Pain       SUBJECTIVE  Shelley Lew is a/an  "58 year old female who is seen in consultation at the request of  Ashvin Sherman PA-C for evaluation of left shoulder pain.     The patient is seen by themselves.    Onset: 2 month(s) ago. Reports insidious onset without acute precipitating event.  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  CV: distal perfusion intact   Resp: normal respiratory effort without conversational dyspnea   Psych: normal mood and affect  Gait: NORMAL  Neuro: Normal light sensory exam of upper extremity     Bilateral Shoulder exam    Inspection and Posture:       rounded shoulders and upper back    Skin:        no visible deformities    Tender:        subacromial space left    Non Tender:       remainder of shoulder bilateral    ROM:        forward flexion 90  left       abduction 90 left       internal rotation gluteal region left       external rotation 35 left    Painful motions:       flexion left       elevation left    Strength:        abduction 3/5 left       flexion 4/5 left       internal rotation 4/5 left       external rotation 3/5 left    Sensation:        normal sensation over shoulder and upper extremity     RADIOLOGY:  Final results and radiologist's interpretation, available in the [facility] health record.  Images were reviewed with the patient in the office today.  My personal interpretation of " the performed imaging:    3 XR views of left shoulder reviewed: no acute bony abnormality, mild - moderate degenerative change  - will follow official read    Review of the result(s) of each unique test - XR           Large Joint Injection/Arthocentesis: L subacromial bursa    Date/Time: 1/5/2024 3:37 PM    Performed by: Meghna Fuentes MD  Authorized by: Meghna Fuentes MD    Indications:  Pain  Needle Size:  25 G  Guidance: landmark guided    Approach:  Posterior  Location:  Shoulder      Site:  L subacromial bursa  Medications:  2 mL lidocaine 1 %; 40 mg triamcinolone 40 MG/ML  Outcome:  Tolerated well, no immediate complications  Procedure discussed: discussed risks, benefits, and alternatives    Consent Given by:  Patient  Timeout: timeout called immediately prior to procedure    Prep: patient was prepped and draped in usual sterile fashion     The risks, benefits and complications of steroid injection were discussed with the patient (including but not limited to: bleeding, infection, pain, scar, damage to adjacent structures, atrophy or necrosis of soft tissue, skin blanching, failure to relieve symptoms, worsening of symptoms, allergic reaction). After this discussion all questions were addressed and answered and the patient elected to proceed. The patient tolerated the procedure well without complications.  Also discussed that if diabetic, recommend close monitoring of blood sugars over the next week as cortisone injections can temporarily elevate blood sugars.

## 2024-01-05 NOTE — PATIENT INSTRUCTIONS
Patient request a cortisone injection today.  Reviewed the potential diagnosis (flare of arthritis, rotator cuff tear, rotator cuff tendinosis or weakness) and discussed the utility of corticosteroid injections (pain relief only).  Stressed the importance of physical therapy to strengthen shoulder and increased liklihood of pain returning if injection is not coupled with therapy.    Plan:  - Today's Plan of Care:  Steroid injection of the left shoulder: subacromial space was performed today in clinic  Icing for the next 1-2 days may be helpful for pain. Injection may take 10-14 days to see the full effect.    Home Exercise Program    -We also discussed other future treatment options:  MRI Left Shoulder  US guided glenohumeral joint injection    Follow Up: as needed    If you have any further questions for your physician or physician s care team you can call [phone] and use option 3 to leave a voice message.     After the Injection     After the injection, strenuous and repetitive activity should be minimized for approximately 48 hours.   Ice should be applied to the injected area at least for the next 48 hours.   Apply ice to the injected area at least 3 - 4 times a day for 20 minutes each time for the next 48 hours. This can reduce the painful  flare  reaction that can follow an injection the next day. This reaction can cause the area that was injected to hurt more the next day just from the injection. This will resolve within a day if it does occur.     Use over-the-counter pain medications such as Tylenol to help with the pain if necessary.     After 48 hours, icing the area may be continued if you find it beneficial.     The lidocaine or marcaine (commonly called Novocain) is an anesthetic agent that is injected with the steroid will typically relieve your pain for a few hours following the injection. If the  Novocain  and steroid are injected near a nerve, you may experience local numbness or weakness  from the nerve block until it wears off. After this wears off your pain may return until the steroid takes effect.   The steroid may be effective immediately after the injection. Do not be concerned if the injection is not effective in relieving your symptoms immediately. In some cases, it may take up to two weeks for the steroid to work.   If you are diabetic, the corticosteroid may cause your blood sugar to become elevated for several days following the injection. This response usually lasts about 2-4 days before it returns to your normal level.   You should report any adverse reaction to you doctor. Call if there are any questions.

## 2024-01-05 NOTE — LETTER
1/5/2024         RE: Shelley Lew  [address] [facility] [address]        Dear Colleague,    Thank you for referring your patient, Shelley Lew, to the [facility] [place]. Please see a copy of my visit note below.    ASSESSMENT & PLAN    Ruthann was seen today for pain.    Diagnoses and all orders for this visit:    Arthritis of left shoulder region  -     Large Joint Injection/Arthocentesis: L subacromial bursa    Chronic left shoulder pain  -     Orthopedic  Referral  -     XR Shoulder Left G/E 3 Views; Future  -     Large Joint Injection/Arthocentesis: L subacromial bursa      This issue is acute on chronic and Worsening.        ICD-10-CM    1. Arthritis of left shoulder region  M19.012 Large Joint Injection/Arthocentesis: L subacromial bursa      2. Chronic left shoulder pain  M25.512 Orthopedic  Referral    G89.29 XR Shoulder Left G/E 3 Views     Large Joint Injection/Arthocentesis: L subacromial bursa           Patient request a cortisone injection today.  Reviewed the potential diagnosis (flare of arthritis, rotator cuff tear, rotator cuff tendinosis or weakness) and discussed the utility of corticosteroid injections (pain relief only).  Stressed the importance of physical therapy to strengthen shoulder and increased liklihood of pain returning if injection is not coupled with therapy.    Plan:  - Today's Plan of Care:  Steroid injection of the left shoulder: subacromial space was performed today in clinic  Icing for the next 1-2 days may be helpful for pain. Injection may take 10-14 days to see the full effect.    Home Exercise Program    -We also discussed other future treatment options:  MRI Left Shoulder  US guided glenohumeral joint injection    Follow Up: as needed    Concerning signs and symptoms were reviewed and all questions were answered at this time.    Thanks for the opportunity to participate in the care of this patient, I will  "keep you updated on their progress.    CC: Ashvin Fuentes MD [facility]  Sports Medicine Physician  [facility]      -----  Chief Complaint   Patient presents with     Left Shoulder - Pain       SUBJECTIVE  Shelley Lew is a/an 58 year old female who is seen in consultation at the request of  Ashvin Sherman PA-C for evaluation of left shoulder pain.     The patient is seen by themselves.    Onset: 2 month(s) ago. Reports insidious onset without acute precipitating event.  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  CV: distal perfusion intact   Resp: normal respiratory effort without conversational dyspnea   Psych: normal mood and affect  Gait: NORMAL  Neuro: Normal light sensory exam of upper extremity     Bilateral Shoulder exam    Inspection and Posture:       rounded shoulders and upper back    Skin:        no visible deformities    Tender:        subacromial space left    Non Tender:       remainder of shoulder bilateral    ROM:        forward flexion 90  left       abduction 90 left       internal rotation gluteal region left       external rotation 35 left    Painful motions:       flexion left       elevation left    Strength:        abduction 3/5 left       flexion 4/5 left       internal rotation 4/5 left       external rotation 3/5 " left    Sensation:        normal sensation over shoulder and upper extremity     RADIOLOGY:  Final results and radiologist's interpretation, available in the [facility] health record.  Images were reviewed with the patient in the office today.  My personal interpretation of the performed imaging:    3 XR views of left shoulder reviewed: no acute bony abnormality, mild - moderate degenerative change  - will follow official read    Review of the result(s) of each unique test - XR           Large Joint Injection/Arthocentesis: L subacromial bursa    Date/Time: 1/5/2024 3:37 PM    Performed by: Meghna Fuentes MD  Authorized by: Meghna Fuentes MD    Indications:  Pain  Needle Size:  25 G  Guidance: landmark guided    Approach:  Posterior  Location:  Shoulder      Site:  L subacromial bursa  Medications:  2 mL lidocaine 1 %; 40 mg triamcinolone 40 MG/ML  Outcome:  Tolerated well, no immediate complications  Procedure discussed: discussed risks, benefits, and alternatives    Consent Given by:  Patient  Timeout: timeout called immediately prior to procedure    Prep: patient was prepped and draped in usual sterile fashion     The risks, benefits and complications of steroid injection were discussed with the patient (including but not limited to: bleeding, infection, pain, scar, damage to adjacent structures, atrophy or necrosis of soft tissue, skin blanching, failure to relieve symptoms, worsening of symptoms, allergic reaction). After this discussion all questions were addressed and answered and the patient elected to proceed. The patient tolerated the procedure well without complications.  Also discussed that if diabetic, recommend close monitoring of blood sugars over the next week as cortisone injections can temporarily elevate blood sugars.        Again, thank you for allowing me to participate in the care of your patient.        Sincerely,        Meghna Fuentes MD

## 2024-01-09 ENCOUNTER — TELEPHONE (OUTPATIENT)
Dept: FAMILY MEDICINE | Facility: OTHER | Age: 59
End: 2024-01-09
Payer: COMMERCIAL

## 2024-01-09 NOTE — TELEPHONE ENCOUNTER
LMTCB just wanted to verify with patient that she wanted to keep the 01/18 visit with  for just the physical portion? She was originally coming for physical and shoulder inj but JR reached out to her to get the shoulder inj thru Ortho which she has done.

## 2024-01-16 ENCOUNTER — HOSPITAL ENCOUNTER (OUTPATIENT)
Dept: MRI IMAGING | Facility: CLINIC | Age: 59
Discharge: HOME | End: 2024-01-16
Attending: NURSE PRACTITIONER
Payer: COMMERCIAL

## 2024-01-16 ENCOUNTER — HOSPITAL ENCOUNTER (OUTPATIENT)
Dept: MAMMOGRAPHY | Facility: CLINIC | Age: 59
Discharge: HOME | End: 2024-01-16
Attending: NURSE PRACTITIONER
Payer: COMMERCIAL

## 2024-01-16 ENCOUNTER — HOSPITAL ENCOUNTER (OUTPATIENT)
Dept: CT IMAGING | Facility: CLINIC | Age: 59
Discharge: HOME | End: 2024-01-16
Attending: NURSE PRACTITIONER
Payer: COMMERCIAL

## 2024-01-16 DIAGNOSIS — Z87.891 PERSONAL HISTORY OF TOBACCO USE: ICD-10-CM

## 2024-01-16 DIAGNOSIS — Z98.890 HISTORY OF LUMBAR LAMINECTOMY: ICD-10-CM

## 2024-01-16 DIAGNOSIS — R20.0 LEFT LEG NUMBNESS: ICD-10-CM

## 2024-01-16 DIAGNOSIS — Z12.31 ENCOUNTER FOR SCREENING MAMMOGRAM FOR BREAST CANCER: ICD-10-CM

## 2024-01-16 DIAGNOSIS — M54.16 LUMBAR RADICULOPATHY: ICD-10-CM

## 2024-01-16 PROCEDURE — 71271 CT THORAX LUNG CANCER SCR C-: CPT

## 2024-01-16 PROCEDURE — A9585 GADOBUTROL INJECTION: HCPCS | Performed by: NURSE PRACTITIONER

## 2024-01-16 PROCEDURE — 77067 SCR MAMMO BI INCL CAD: CPT

## 2024-01-16 PROCEDURE — 72158 MRI LUMBAR SPINE W/O & W/DYE: CPT

## 2024-01-16 PROCEDURE — 255N000002 HC RX 255 OP 636: Performed by: NURSE PRACTITIONER

## 2024-01-16 RX ORDER — GADOBUTROL 604.72 MG/ML
7.5 INJECTION INTRAVENOUS ONCE
Status: COMPLETED | OUTPATIENT
Start: 2024-01-16 | End: 2024-01-16

## 2024-01-16 RX ADMIN — GADOBUTROL 7.5 ML: 604.72 INJECTION INTRAVENOUS at 16:03

## 2024-01-17 ENCOUNTER — TELEPHONE (OUTPATIENT)
Dept: FAMILY MEDICINE | Facility: OTHER | Age: 59
End: 2024-01-17
Payer: COMMERCIAL

## 2024-01-17 DIAGNOSIS — M54.16 LUMBAR RADICULOPATHY: Primary | ICD-10-CM

## 2024-01-17 DIAGNOSIS — R20.0 LEFT LEG NUMBNESS: ICD-10-CM

## 2024-01-17 NOTE — TELEPHONE ENCOUNTER
Referral placed      ROBERTA Toro CNP  Questions or concerns please feel free to send me a Conjure message or call me  Phone : [phone]

## 2024-01-22 NOTE — CONFIDENTIAL NOTE
NEUROSURGERY- NEW PREVISIT PLANNING       Record Status/Location     Referring Provider Referral Marine Cain APRN CNP    Diagnosis Referral M54.16 (ICD-10-CM) - Lumbar radiculopathy   R20.0 (ICD-10-CM) - Left leg numbness      MRI (HEAD, NECK, SPINE) Pacs Lumbar 1/16/24 [facility]   CT Pacs Pelvis 11/7/21 [facility]    X-ray Pacs Lumbar 4/11/17 [facility]    INJECTION Na    PHYSICAL THERAPY Na    SURGERY Encounter 4/11/17- Lumbar 3-4 & Lumbar 4-5 Laminectomy & Disc Excision

## 2024-01-25 ENCOUNTER — OFFICE VISIT (OUTPATIENT)
Dept: NEUROSURGERY | Facility: CLINIC | Age: 59
End: 2024-01-25
Attending: NURSE PRACTITIONER
Payer: COMMERCIAL

## 2024-01-25 ENCOUNTER — PRE VISIT (OUTPATIENT)
Dept: NEUROSURGERY | Facility: CLINIC | Age: 59
End: 2024-01-25

## 2024-01-25 VITALS — DIASTOLIC BLOOD PRESSURE: 97 MMHG | SYSTOLIC BLOOD PRESSURE: 163 MMHG | HEART RATE: 105 BPM | OXYGEN SATURATION: 96 %

## 2024-01-25 DIAGNOSIS — M54.16 LUMBAR RADICULOPATHY: Primary | ICD-10-CM

## 2024-01-25 DIAGNOSIS — Z98.890 S/P LUMBAR LAMINECTOMY: ICD-10-CM

## 2024-01-25 PROCEDURE — 99204 OFFICE O/P NEW MOD 45 MIN: CPT | Performed by: NURSE PRACTITIONER

## 2024-01-25 ASSESSMENT — PAIN SCALES - GENERAL: PAINLEVEL: SEVERE PAIN (6)

## 2024-01-25 NOTE — PATIENT INSTRUCTIONS
Order for left L4-5 epidural steroid injection. They will call you to schedule.     Referral to physical therapy. They will call you to schedule.     Please follow-up with our clinic if symptoms persist, change, or worsen at any time.

## 2024-01-25 NOTE — PROGRESS NOTES
[facility] Neurosurgery Clinic Visit      CC: low back pain, left leg pain/numbness/tingling     Primary Care Provider: Ashvin Sherman    Reason For Visit:   I was asked by Marine Cain CNP to consult on the patient for:   M54.16 (ICD-10-CM) - Lumbar radiculopathy   R20.0 (ICD-10-CM) - Left leg numbness     HPI: Shelley Lew is a 58 year old female with history of L3-5 laminectomy and discectomy in 2017 with Dr. Bangura for neurogenic claudication. Patient did well post op but then developed low back and left leg pain about 1 year ago. No trauma or injuries at onset. Today, patient reports sharp, midline low back pain that radiates to left posterior leg to ankle with associated paresthesias. Symptoms worsen with walking, bending, and standing. She reports some leg weakness with activity and has difficulty walking more than 1 block due to her symptoms. Denies any falls, foot drop, saddle anesthesia, or bladder/bowel incontinence. Patient has tried flexeril and chiropractic for pain management.     Patient is currently trying to quit smoking.     Current pain: 6/10     Past Medical History:   Diagnosis Date     (normal spontaneous vaginal delivery)     X2    PONV (postoperative nausea and vomiting)     Pyelonephritis, acute        Past Medical History reviewed with patient during visit.    Past Surgical History:   Procedure Laterality Date    CHOLECYSTECTOMY      COLONOSCOPY N/A 2020    Procedure: COLONOSCOPY;  Surgeon: Shawn Amor MD;  Location: [place] [facility]    LAMINECTOMY LUMBAR POSTERIOR MICROSCOPIC TWO LEVELS N/A 2017    Procedure: LAMINECTOMY LUMBAR POSTERIOR MICROSCOPIC TWO LEVELS;  Surgeon: Nehemias Bangura MD;  Location: [facility] [facility]    LAPAROSCOPY,TUBAL CAUTERY      RELEASE CARPAL TUNNEL  2013    Procedure: RELEASE CARPAL TUNNEL;  Left carpal tunnel release  ;  Surgeon: Yovani Gonzalez MD;  Location: [facility] [facility]    RELEASE CARPAL TUNNEL Right 10/15/2019    Procedure: Open Carpal  Tunnel Release;  Surgeon: Yovani Gonzalez MD;  Location: [place] [facility]     Past Surgical History reviewed with patient during visit.    Current Outpatient Medications   Medication    cyclobenzaprine (FLEXERIL) 5 MG tablet    nicotine (NICODERM CQ) 7 MG/24HR 24 hr patch     Current Facility-Administered Medications   Medication    lidocaine 1 % injection 2 mL    triamcinolone (KENALOG-40) injection 40 mg       Allergies   Allergen Reactions    Percocet [Oxycodone-Acetaminophen] Nausea and Vomiting     Nausea vomiting and head swimming    Vicodin [Hydrocodone-Acetaminophen] Nausea and Vomiting       Social History     Socioeconomic History    Marital status: Single     Spouse name: None    Number of children: None    Years of education: None    Highest education level: None   Tobacco Use    Smoking status: Light Smoker     Types: Cigarettes    Smokeless tobacco: Never    Tobacco comments:     one pack lasts a week   Vaping Use    Vaping Use: Former   Substance and Sexual Activity    Alcohol use: Not Currently     Comment: OCC., few sips of wine 9/3/19    Drug use: Not Currently     Types: Methamphetamines     Comment: smokes, not IV    Sexual activity: Yes     Partners: Male     Birth control/protection: Surgical     Comment: tubal ligation   Other Topics Concern    Parent/sibling w/ CABG, MI or angioplasty before 65F 55M? No   Social History Narrative    ** Merged History Encounter **          Social Determinants of Health     Financial Resource Strain: High Risk (12/5/2023)    Financial Resource Strain     Within the past 12 months, have you or your family members you live with been unable to get utilities (heat, electricity) when it was really needed?: Yes   Food Insecurity: High Risk (12/5/2023)    Food Insecurity     Within the past 12 months, did you worry that your food would run out before you got money to buy more?: Yes     Within the past 12 months, did the food you bought just not last and you didn t have money  to get more?: Yes   Transportation Needs: High Risk (12/5/2023)    Transportation Needs     Within the past 12 months, has lack of transportation kept you from medical appointments, getting your medicines, non-medical meetings or appointments, work, or from getting things that you need?: Yes   Housing Stability: High Risk (12/5/2023)    Housing Stability     Do you have housing? : Yes     Are you worried about losing your housing?: Yes       Family History   Problem Relation Age of Onset    Cancer Sister         cervical    Cancer Sister         cervical       ROS: 10 point ROS neg other than the symptoms noted above in the HPI.    Vital Signs: BP (!) 163/97   Pulse 105   LMP 04/20/2016 (Approximate)   SpO2 96%     Neurological Examination:  Awake  Alert  Oriented x 3  Speech clear    Motor exam:  Iliopsoas  (hip flexion)               Right: 5/5  Left:  5/5  Quadriceps  (knee extension)       Right:  5/5  Left:  5/5  Hamstrings  (knee flexion)            Right:  5/5  Left:  5/5  Gastroc Soleus  (PF)                          Right:  5/5  Left:  5/5  Tibialis Ant  (DF)                          Right:  5/5  Left:  5/5  EHL                          Right:  5/5  Left:  5/5         Decreased sensation in left shin, calf, and foot  Reflexes are 2+ in the patellar and Achilles.    Negative clonus bilaterally.     Musculoskeletal:  Gait: Able to stand from a seated position.  Antalgic gait.  No tenderness of the lumbar spine or paraspinous muscles.   Negative SI joint tenderness bilaterally.  Negative straight leg raise bilaterally.      Imaging:   EXAM: MR LUMBAR SPINE W/O and W CONTRAST  LOCATION: [facility]  DATE: 1/16/2024                                               IMPRESSION:  1.  Postoperative changes at the L3-L4 level with improvement of spinal canal narrowing at that level. There is now mild to moderate spinal canal narrowing at this level. Moderate to severe right and moderate  left neural foraminal narrowing appears to be slightly worsened compared to prior MR 03/07/2017.  2.  Additional degenerative changes throughout the lumbar spine as detailed above particularly at L2-L3, L4-L5, and L5-S1. These overall appear worse since prior MR 03/07/2017.  3.  At L2-L3, there is moderate spinal canal narrowing as well as moderate right and mild left neural foraminal narrowing.  4.  At L4-L5, there is moderate spinal canal narrowing with narrowing of lateral recesses. Moderate to severe left neural foraminal narrowing.  5.  At L5-S1, there is moderate bilateral neural foraminal narrowing.    Assessment/Plan:   58 year old female with history of L3-5 laminectomy and discectomy in 2017 who presents for evaluation of midline low back pain that radiates to left posterior leg to ankle with numbness in left lower leg and foot.  Lumbar spine MRI reveals post op changes with improvement of spinal canal stenosis at L3-4, moderate spinal canal stenosis at L2-3, moderate spinal canal stenosis and moderate to severe left foraminal stenosis at L4-5, and moderate bilateral foraminal stenosis at L5-S1. We discussed symptoms, imaging, and next steps.      - Left L4-5 LING  - Referral to PT  - Follow up with Dr. Sorto if symptoms persist     Advised patient to call our clinic with any questions or concerns.   Patient voiced understanding and agreement.      Madhavi Best [facility]  Tel [phone]  Pager [phone]

## 2024-01-25 NOTE — NURSING NOTE
"Shelley Lew is a 58 year old female who presents for:  Chief Complaint   Patient presents with    Consult     Lumbar radiculopathy [M54.16], Left leg numbness [R20.0]           Initial Vitals:  BP (!) 163/97   Pulse 105   LMP 04/20/2016 (Approximate)   SpO2 96%  Estimated body mass index is 28.49 kg/m  as calculated from the following:    Height as of 1/5/24: 5' 4\" (1.626 m).    Weight as of 1/5/24: 166 lb (75.3 kg).. There is no height or weight on file to calculate BSA. BP completed using cuff size: regular  Severe Pain (6)    Nursing Comments:       Ava Harrell    "

## 2024-01-25 NOTE — LETTER
2024         RE: Shelley Lew  [address] [facility] [address]        Dear Colleague,    Thank you for referring your patient, Shelley Lew, to the [facility] NEUROLOGICAL CLINIC [facility]. Please see a copy of my visit note below.    [facility] Neurosurgery Clinic Visit      CC: low back pain, left leg pain/numbness/tingling     Primary Care Provider: Ashvin Sherman    Reason For Visit:   I was asked by Marine Cain CNP to consult on the patient for:   M54.16 (ICD-10-CM) - Lumbar radiculopathy   R20.0 (ICD-10-CM) - Left leg numbness     HPI: Shelley Lew is a 58 year old female with history of L3-5 laminectomy and discectomy in 2017 with Dr. Bangura for neurogenic claudication. Patient did well post op but then developed low back and left leg pain about 1 year ago. No trauma or injuries at onset. Today, patient reports sharp, midline low back pain that radiates to left posterior leg to ankle with associated paresthesias. Symptoms worsen with walking, bending, and standing. She has difficulty walking more than 1 block due to her symptoms. Denies any overt weakness, falls, foot drop, saddle anesthesia, or bladder/bowel incontinence. Patient has tried flexeril and chiropractic for pain management.     Patient is currently trying to quit smoking.     Current pain: 6/10     Past Medical History:   Diagnosis Date      (normal spontaneous vaginal delivery)     X2     PONV (postoperative nausea and vomiting)      Pyelonephritis, acute        Past Medical History reviewed with patient during visit.    Past Surgical History:   Procedure Laterality Date     CHOLECYSTECTOMY       COLONOSCOPY N/A 2020    Procedure: COLONOSCOPY;  Surgeon: Shawn Amor MD;  Location: [place] [facility]     LAMINECTOMY LUMBAR POSTERIOR MICROSCOPIC TWO LEVELS N/A 2017    Procedure: LAMINECTOMY LUMBAR POSTERIOR MICROSCOPIC TWO LEVELS;  Surgeon: Nehemias Bangura MD;  Location: [place] [facility]      LAPAROSCOPY,TUBAL CAUTERY       RELEASE CARPAL TUNNEL  05/21/2013    Procedure: RELEASE CARPAL TUNNEL;  Left carpal tunnel release  ;  Surgeon: Yovani Gonzalez MD;  Location: [place] [facility]     RELEASE CARPAL TUNNEL Right 10/15/2019    Procedure: Open Carpal Tunnel Release;  Surgeon: Yovani Gonzalez MD;  Location: [place] [facility]     Past Surgical History reviewed with patient during visit.    Current Outpatient Medications   Medication     cyclobenzaprine (FLEXERIL) 5 MG tablet     nicotine (NICODERM CQ) 7 MG/24HR 24 hr patch     Current Facility-Administered Medications   Medication     lidocaine 1 % injection 2 mL     triamcinolone (KENALOG-40) injection 40 mg       Allergies   Allergen Reactions     Percocet [Oxycodone-Acetaminophen] Nausea and Vomiting     Nausea vomiting and head swimming     Vicodin [Hydrocodone-Acetaminophen] Nausea and Vomiting       Social History     Socioeconomic History     Marital status: Single     Spouse name: None     Number of children: None     Years of education: None     Highest education level: None   Tobacco Use     Smoking status: Light Smoker     Types: Cigarettes     Smokeless tobacco: Never     Tobacco comments:     one pack lasts a week   Vaping Use     Vaping Use: Former   Substance and Sexual Activity     Alcohol use: Not Currently     Comment: OCC., few sips of wine 9/3/19     Drug use: Not Currently     Types: Methamphetamines     Comment: smokes, not IV     Sexual activity: Yes     Partners: Male     Birth control/protection: Surgical     Comment: tubal ligation   Other Topics Concern     Parent/sibling w/ CABG, MI or angioplasty before 65F 55M? No   Social History Narrative    ** Merged History Encounter **          Social Determinants of Health     Financial Resource Strain: High Risk (12/5/2023)    Financial Resource Strain      Within the past 12 months, have you or your family members you live with been unable to get utilities (heat, electricity) when it was really  needed?: Yes   Food Insecurity: High Risk (12/5/2023)    Food Insecurity      Within the past 12 months, did you worry that your food would run out before you got money to buy more?: Yes      Within the past 12 months, did the food you bought just not last and you didn t have money to get more?: Yes   Transportation Needs: High Risk (12/5/2023)    Transportation Needs      Within the past 12 months, has lack of transportation kept you from medical appointments, getting your medicines, non-medical meetings or appointments, work, or from getting things that you need?: Yes   Housing Stability: High Risk (12/5/2023)    Housing Stability      Do you have housing? : Yes      Are you worried about losing your housing?: Yes       Family History   Problem Relation Age of Onset     Cancer Sister         cervical     Cancer Sister         cervical       ROS: 10 point ROS neg other than the symptoms noted above in the HPI.    Vital Signs: BP (!) 163/97   Pulse 105   LMP 04/20/2016 (Approximate)   SpO2 96%     Neurological Examination:  Awake  Alert  Oriented x 3  Speech clear    Motor exam:  Iliopsoas  (hip flexion)               Right: 5/5  Left:  5/5  Quadriceps  (knee extension)       Right:  5/5  Left:  5/5  Hamstrings  (knee flexion)            Right:  5/5  Left:  5/5  Gastroc Soleus  (PF)                          Right:  5/5  Left:  5/5  Tibialis Ant  (DF)                          Right:  5/5  Left:  5/5  EHL                          Right:  5/5  Left:  5/5         Decreased sensation in left shin, calf, and foot  Reflexes are 2+ in the patellar and Achilles.    Negative clonus bilaterally.     Musculoskeletal:  Gait: Able to stand from a seated position.  Antalgic gait.  No tenderness of the lumbar spine or paraspinous muscles.   Negative SI joint tenderness bilaterally.  Negative straight leg raise bilaterally.      Imaging:   EXAM: MR LUMBAR SPINE W/O and W CONTRAST  LOCATION: [facility]  DATE: 1/16/2024                                               IMPRESSION:  1.  Postoperative changes at the L3-L4 level with improvement of spinal canal narrowing at that level. There is now mild to moderate spinal canal narrowing at this level. Moderate to severe right and moderate left neural foraminal narrowing appears to be slightly worsened compared to prior MR 03/07/2017.  2.  Additional degenerative changes throughout the lumbar spine as detailed above particularly at L2-L3, L4-L5, and L5-S1. These overall appear worse since prior MR 03/07/2017.  3.  At L2-L3, there is moderate spinal canal narrowing as well as moderate right and mild left neural foraminal narrowing.  4.  At L4-L5, there is moderate spinal canal narrowing with narrowing of lateral recesses. Moderate to severe left neural foraminal narrowing.  5.  At L5-S1, there is moderate bilateral neural foraminal narrowing.    Assessment/Plan:   58 year old female with history of L3-5 laminectomy and discectomy in 2017 who presents for evaluation of midline low back pain that radiates to left posterior leg to ankle with numbness in left lower leg and foot.  Lumbar spine MRI reveals post op changes with improvement of spinal canal stenosis at L3-4, moderate spinal canal stenosis at L2-3, moderate spinal canal stenosis and moderate to severe left foraminal stenosis at L4-5, and moderate bilateral foraminal stenosis at L5-S1. We discussed symptoms, imaging, and next steps.      - Left L4-5 LING  - Referral to PT  - Follow up with Dr. Sorto if symptoms persist     Advised patient to call our clinic with any questions or concerns.   Patient voiced understanding and agreement.      Madhavi Best, CNP  [facility] Neurosurgery  Tel [phone]  Pager [phone]        Again, thank you for allowing me to participate in the care of your patient.        Sincerely,        Madhavi Best, AVERY

## 2024-07-26 ENCOUNTER — OFFICE VISIT (OUTPATIENT)
Dept: FAMILY MEDICINE | Facility: OTHER | Age: 59
End: 2024-07-26
Payer: COMMERCIAL

## 2024-07-26 VITALS
OXYGEN SATURATION: 97 % | HEART RATE: 88 BPM | RESPIRATION RATE: 16 BRPM | TEMPERATURE: 97.2 F | SYSTOLIC BLOOD PRESSURE: 128 MMHG | BODY MASS INDEX: 29.06 KG/M2 | DIASTOLIC BLOOD PRESSURE: 70 MMHG | WEIGHT: 164 LBS | HEIGHT: 63 IN

## 2024-07-26 DIAGNOSIS — Z91.89 RISK OF EXPOSURE TO LYME DISEASE: ICD-10-CM

## 2024-07-26 DIAGNOSIS — M77.9 BONE SPUR: ICD-10-CM

## 2024-07-26 DIAGNOSIS — B18.2 CHRONIC HEPATITIS C WITHOUT HEPATIC COMA (H): ICD-10-CM

## 2024-07-26 DIAGNOSIS — J40 BRONCHITIS: ICD-10-CM

## 2024-07-26 DIAGNOSIS — Z01.818 PREOP GENERAL PHYSICAL EXAM: Primary | ICD-10-CM

## 2024-07-26 LAB
ANION GAP SERPL CALCULATED.3IONS-SCNC: 9 MMOL/L (ref 7–15)
BUN SERPL-MCNC: 13.7 MG/DL (ref 8–23)
CALCIUM SERPL-MCNC: 9.4 MG/DL (ref 8.8–10.4)
CHLORIDE SERPL-SCNC: 107 MMOL/L (ref 98–107)
CREAT SERPL-MCNC: 0.76 MG/DL (ref 0.51–0.95)
EGFRCR SERPLBLD CKD-EPI 2021: 90 ML/MIN/1.73M2
GLUCOSE SERPL-MCNC: 105 MG/DL (ref 70–99)
HCO3 SERPL-SCNC: 27 MMOL/L (ref 22–29)
INR PPP: 0.98 (ref 0.85–1.15)
POTASSIUM SERPL-SCNC: 4.3 MMOL/L (ref 3.4–5.3)
SODIUM SERPL-SCNC: 143 MMOL/L (ref 135–145)

## 2024-07-26 PROCEDURE — 80048 BASIC METABOLIC PNL TOTAL CA: CPT | Performed by: PHYSICIAN ASSISTANT

## 2024-07-26 PROCEDURE — 85610 PROTHROMBIN TIME: CPT | Performed by: PHYSICIAN ASSISTANT

## 2024-07-26 PROCEDURE — 86618 LYME DISEASE ANTIBODY: CPT | Performed by: PHYSICIAN ASSISTANT

## 2024-07-26 PROCEDURE — 36415 COLL VENOUS BLD VENIPUNCTURE: CPT | Performed by: PHYSICIAN ASSISTANT

## 2024-07-26 PROCEDURE — 93000 ELECTROCARDIOGRAM COMPLETE: CPT | Performed by: PHYSICIAN ASSISTANT

## 2024-07-26 PROCEDURE — 99214 OFFICE O/P EST MOD 30 MIN: CPT | Performed by: PHYSICIAN ASSISTANT

## 2024-07-26 PROCEDURE — 87635 SARS-COV-2 COVID-19 AMP PRB: CPT | Performed by: PHYSICIAN ASSISTANT

## 2024-07-26 RX ORDER — AZITHROMYCIN 250 MG/1
TABLET, FILM COATED ORAL
Qty: 6 TABLET | Refills: 0 | Status: SHIPPED | OUTPATIENT
Start: 2024-07-26 | End: 2024-07-31

## 2024-07-26 ASSESSMENT — PATIENT HEALTH QUESTIONNAIRE - PHQ9
SUM OF ALL RESPONSES TO PHQ QUESTIONS 1-9: 0
10. IF YOU CHECKED OFF ANY PROBLEMS, HOW DIFFICULT HAVE THESE PROBLEMS MADE IT FOR YOU TO DO YOUR WORK, TAKE CARE OF THINGS AT HOME, OR GET ALONG WITH OTHER PEOPLE: NOT DIFFICULT AT ALL
SUM OF ALL RESPONSES TO PHQ QUESTIONS 1-9: 0

## 2024-07-26 NOTE — PROGRESS NOTES
Preoperative Evaluation  [facility]  [address]  [place] [address]  Phone: [phone]  Primary Provider: Ashvin Sherman PA-C  Pre-op Performing Provider: Ashvin Sherman PA-C  Jul 26, 2024 7/26/2024   Surgical Information   What procedure is being done? mtp fusion   Facility or Hospital where procedure/surgery [facility] be performed[facility]   Who is doing the procedure / surgery? dr castelan   Date of surgery / procedure: july 31 2024   Time of surgery / procedure: TBD   Where do you plan to recover after surgery? at home with family        Fax number for surgical facility: [phone]    Additional concerns: she is wanting a blood draw for lyme's testing, she has people in the area that have gotten it so she wants to be sure she doesn't have it and she also recently developed a cold and she thought she was due for some vaccines but wasn't sure if she could or should complete them today or wait.    Right 1st bone on the MTP    Assessment & Plan     The proposed surgical procedure is considered INTERMEDIATE risk.    Preop general physical exam  Bone spur  Patient was provided with instruction on preparation for the upcoming procedure. A copy of these instructions were attached to the AVS for the patient to review at home.   - EKG 12-lead complete w/read - Clinics  - INR; Future  - Basic metabolic panel  (Ca, Cl, CO2, Creat, Gluc, K, Na, BUN); Future  - INR  - Basic metabolic panel  (Ca, Cl, CO2, Creat, Gluc, K, Na, BUN)    Bronchitis  Discussed use of oral antibiotic to cover for bacterial cause. Did complete covid swab today and may change treatment pending results. Home therapies reviewed with patient.   - Symptomatic COVID-19 Virus (Coronavirus) by PCR Nose  - azithromycin (ZITHROMAX) 250 MG tablet; Take 2 tablets (500 mg) by mouth daily for 1 day, THEN 1 tablet (250 mg) daily for 4 days.    Risk of exposure to Lyme disease  Reports exposure to ticks  through dog which sleeps with her. Currently experiencing malaise from URI, but cannot rule out lyme without lab workup. Will notify her of the results.   - LYME DISEASE TOTAL ANTIBODIES WITH REFLEX TO CONFIRMATION; Future  - LYME DISEASE TOTAL ANTIBODIES WITH REFLEX TO CONFIRMATION    Chronic hepatitis C   No new concerns. No changes recommended at this time.        Risks and Recommendations  The patient has the following additional risks and recommendations for perioperative complications:   - No identified additional risk factors other than previously addressed    Antiplatelet or Anticoagulation Medication Instructions   - Patient is on no antiplatelet or anticoagulation medications.    Additional Medication Instructions  Patient is on no additional chronic medications   - Herbal medications and vitamins: DO NOT TAKE 14 days prior to surgery.    Recommendation  Approval given to proceed with proposed procedure, without further diagnostic evaluation.    Rosalie Beavers is a 59 year old, presenting for the following:  Pre-Op Exam          7/26/2024    10:07 AM   Additional Questions   Roomed by Mireille BOB   Accompanied by self     HPI related to upcoming procedure:   Patient scheduled for procedure to address bony spurring at the 1st MTP joint of the right foot. She is working with [facility] for this and brought form from the surgeon with required testing. This will be completed and results will be faxed.         7/26/2024   Pre-Op Questionnaire   Have you ever had a heart attack or stroke? No   Have you ever had surgery on your heart or blood vessels, such as a stent placement, a coronary artery bypass, or surgery on an artery in your head, neck, heart, or legs? No   Do you have chest pain with activity? No   Do you have a history of heart failure? No   Do you currently have a cold, bronchitis or symptoms of other infection? (!) YES    Do you have a cough, shortness of breath, or wheezing? (!) YES    Do you or anyone in  your family have previous history of blood clots? No   Do you or does anyone in your family have a serious bleeding problem such as prolonged bleeding following surgeries or cuts? No   Have you ever had problems with anemia or been told to take iron pills? No   Have you had any abnormal blood loss such as black, tarry or bloody stools, or abnormal vaginal bleeding? No   Have you ever had a blood transfusion? No   Are you willing to have a blood transfusion if it is medically needed before, during, or after your surgery? Yes   Have you or any of your relatives ever had problems with anesthesia? No   Do you have sleep apnea, excessive snoring or daytime drowsiness? No   Do you have any artifical heart valves or other implanted medical devices like a pacemaker, defibrillator, or continuous glucose monitor? No   Do you have artificial joints? No   Are you allergic to latex? No        Health Care Directive  Patient does not have a Health Care Directive or Living Will: Discussed advance care planning with patient; however, patient declined at this time.    Preoperative Review of    reviewed - no record of controlled substances prescribed.      Status of Chronic Conditions:  See problem list for active medical problems.  Problems all longstanding and stable, except as noted/documented.  See ROS for pertinent symptoms related to these conditions.    Patient Active Problem List    Diagnosis Date Noted    Current moderate episode of major depressive disorder, unspecified whether recurrent (H) 12/06/2023     Priority: Medium    Chronic hepatitis C without hepatic coma (H) 10/09/2019     Priority: Medium     Treated.  Sustained response.  Felt to be cured       Vertigo 06/23/2018     Priority: Medium    Hyperlipidemia LDL goal <130 07/06/2017     Priority: Medium    Stress incontinence of urine 03/28/2017     Priority: Medium    Spinal stenosis of lumbar region with neurogenic claudication 02/16/2017     Priority: Medium      Spine surg referral.  Many MRI findings.  Severely limited by this.    Intermittent meth use has been an issue for her, would be careful with opioids.        Tobacco use disorder 2017     Priority: Medium    CTS (carpal tunnel syndrome) 05/15/2013     Priority: Medium    CARDIOVASCULAR SCREENING; LDL GOAL LESS THAN 160 10/31/2010     Priority: Medium      Past Medical History:   Diagnosis Date     (normal spontaneous vaginal delivery)     X2    PONV (postoperative nausea and vomiting)     Pyelonephritis, acute      Past Surgical History:   Procedure Laterality Date    CHOLECYSTECTOMY      COLONOSCOPY N/A 2020    Procedure: COLONOSCOPY;  Surgeon: Shawn Amor MD;  Location: [facility]    LAMINECTOMY LUMBAR POSTERIOR MICROSCOPIC TWO LEVELS N/A 2017    Procedure: LAMINECTOMY LUMBAR POSTERIOR MICROSCOPIC TWO LEVELS;  Surgeon: Nehemias Bangura MD;  Location: [facility]    LAPAROSCOPY,TUBAL CAUTERY      RELEASE CARPAL TUNNEL  2013    Procedure: RELEASE CARPAL TUNNEL;  Left carpal tunnel release  ;  Surgeon: Yovani Gonzalez MD;  Location: [facility]    RELEASE CARPAL TUNNEL Right 10/15/2019    Procedure: Open Carpal Tunnel Release;  Surgeon: Yovani Gonzalez MD;  Location: [facility]     Current Outpatient Medications   Medication Sig Dispense Refill    cyclobenzaprine (FLEXERIL) 5 MG tablet Take 1 tablet (5 mg) by mouth daily as needed for muscle spasms 30 tablet 0       Allergies   Allergen Reactions    Percocet [Oxycodone-Acetaminophen] Nausea and Vomiting     Nausea vomiting and head swimming    Vicodin [Hydrocodone-Acetaminophen] Nausea and Vomiting        Social History     Tobacco Use    Smoking status: Light Smoker     Types: Cigarettes    Smokeless tobacco: Never    Tobacco comments:     one pack lasts a week   Substance Use Topics    Alcohol use: Not Currently     Comment: OCC., few sips of wine 9/3/19     History   Drug Use Unknown     Comment: smokes, not IV             Review of  "Systems  Constitutional, HEENT, cardiovascular, pulmonary, gi and gu systems are negative, except as otherwise noted.    Objective    /70   Pulse 88   Temp 97.2  F (36.2  C) (Temporal)   Resp 16   Ht 1.607 m (5' 3.25\")   Wt 74.4 kg (164 lb)   LMP 04/20/2016 (Approximate)   SpO2 97%   BMI 28.82 kg/m     Estimated body mass index is 28.82 kg/m  as calculated from the following:    Height as of this encounter: 1.607 m (5' 3.25\").    Weight as of this encounter: 74.4 kg (164 lb).  Physical Exam  GENERAL: alert and no distress  EYES: Eyes grossly normal to inspection, PERRL and conjunctivae and sclerae normal  HENT: normal cephalic/atraumatic, ear canals and TM's normal, nasal mucosa edematous , rhinorrhea clear, oropharynx clear, and oral mucous membranes moist  NECK: no adenopathy, no asymmetry, masses, or scars  RESP: lungs clear to auscultation - no rales, rhonchi or wheezes  CV: regular rate and rhythm, normal S1 S2, no S3 or S4, no murmur, click or rub, no peripheral edema  PSYCH: mentation appears normal, affect normal/bright    No results for input(s): \"HGB\", \"PLT\", \"INR\", \"NA\", \"POTASSIUM\", \"CR\", \"A1C\" in the last 8760 hours.     Diagnostics  Labs pending at this time.  Results will be reviewed when available.  No results found for this or any previous visit (from the past 24 hour(s)).   EKG: appears normal, NSR, normal axis, normal intervals, no acute ST/T changes c/w ischemia, no LVH by voltage criteria    Revised Cardiac Risk Index (RCRI)  The patient has the following serious cardiovascular risks for perioperative complications:   - No serious cardiac risks = 0 points     RCRI Interpretation: 0 points: Class I (very low risk - 0.4% complication rate)         Signed Electronically by: Ashvin Sherman PA-C  A copy of this evaluation report is provided to the requesting physician.    Prior to immunization administration, verified patients identity using patient s name and date of birth. Please " see Immunization Activity for additional information.     Screening Questionnaire for Adult Immunization    Are you sick today?   Yes   Do you have allergies to medications, food, a vaccine component or latex?   No   Have you ever had a serious reaction after receiving a vaccination?   No   Do you have a long-term health problem with heart, lung, kidney, or metabolic disease (e.g., diabetes), asthma, a blood disorder, no spleen, complement component deficiency, a cochlear implant, or a spinal fluid leak?  Are you on long-term aspirin therapy?   No   Do you have cancer, leukemia, HIV/AIDS, or any other immune system problem?   No   Do you have a parent, brother, or sister with an immune system problem?   No   In the past 3 months, have you taken medications that affect  your immune system, such as prednisone, other steroids, or anticancer drugs; drugs for the treatment of rheumatoid arthritis, Crohn s disease, or psoriasis; or have you had radiation treatments?   No   Have you had a seizure, or a brain or other nervous system problem?   No   During the past year, have you received a transfusion of blood or blood    products, or been given immune (gamma) globulin or antiviral drug?   No   For women: Are you pregnant or is there a chance you could become       pregnant during the next month?   No   Have you received any vaccinations in the past 4 weeks?   No     Immunization questionnaire was positive for at least one answer.  Notified Dion Sherman.      Patient instructed to remain in clinic for 15 minutes afterwards, and to report any adverse reactions.     Screening performed by Mireille Madden CMA on 7/26/2024 at 10:13 AM.       Answers submitted by the patient for this visit:  Patient Health Questionnaire (Submitted on 7/26/2024)  If you checked off any problems, how difficult have these problems made it for you to do your work, take care of things at home, or get along with other people?: Not difficult at  all  PHQ9 TOTAL SCORE: 0

## 2024-07-26 NOTE — PATIENT INSTRUCTIONS
Patient Education   Preparing for Your Surgery  Getting started  A nurse will call you to review your health history and instructions. They will give you an arrival time based on your scheduled surgery time. Please be ready to share:  Your doctor's clinic name and phone number  Your medical, surgical, and anesthesia history  A list of allergies and sensitivities  A list of medicines, including herbal treatments and over-the-counter drugs  Whether the patient has a legal guardian (ask how to send us the papers in advance)  Please tell us if you're pregnant--or if there's any chance you might be pregnant. Some surgeries may injure a fetus (unborn baby), so they require a pregnancy test. Surgeries that are safe for a fetus don't always need a test, and you can choose whether to have one.   If you have a child who's having surgery, please ask for a copy of Preparing for Your Child's Surgery.    Preparing for surgery  Within 10 to 30 days of surgery: Have a pre-op exam (sometimes called an H&P, or History and Physical). This can be done at a clinic or pre-operative center.  If you're having a , you may not need this exam. Talk to your care team.  At your pre-op exam, talk to your care team about all medicines you take. If you need to stop any medicines before surgery, ask when to start taking them again.  We do this for your safety. Many medicines can make you bleed too much during surgery. Some change how well surgery (anesthesia) drugs work.  Call your insurance company to let them know you're having surgery. (If you don't have insurance, call [phone].)  Call your clinic if there's any change in your health. This includes signs of a cold or flu (sore throat, runny nose, cough, rash, fever). It also includes a scrape or scratch near the surgery site.  If you have questions on the day of surgery, call your hospital or surgery center.  Eating and drinking guidelines  For your safety: Unless your  surgeon tells you otherwise, follow the guidelines below.  Eat and drink as usual until 8 hours before you arrive for surgery. After that, no food or milk.  Drink clear liquids until 2 hours before you arrive. These are liquids you can see through, like water, Gatorade, and Propel Water. They also include plain black coffee and tea (no cream or milk), candy, and breath mints. You can spit out gum when you arrive.  If you drink alcohol: Stop drinking it the night before surgery.  Avoid NSAIDs (ibuprofen, advil, motrin, aleve, naproxen, etc) 1 week prior  If your care team tells you to take medicine on the morning of surgery, it's okay to take it with a sip of water.  Preventing infection  Shower or bathe the night before and morning of your surgery. Follow the instructions your clinic gave you. (If no instructions, use regular soap.)  Don't shave or clip hair near your surgery site. We'll remove the hair if needed.  Don't smoke or vape the morning of surgery. You may chew nicotine gum up to 2 hours before surgery. A nicotine patch is okay.  Note: Some surgeries require you to completely quit smoking and nicotine. Check with your surgeon.  Your care team will make every effort to keep you safe from infection. We will:  Clean our hands often with soap and water (or an alcohol-based hand rub).  Clean the skin at your surgery site with a special soap that kills germs.  Give you a special gown to keep you warm. (Cold raises the risk of infection.)  Wear special hair covers, masks, gowns and gloves during surgery.  Give antibiotic medicine, if prescribed. Not all surgeries need antibiotics.  What to bring on the day of surgery  Photo ID and insurance card  Copy of your health care directive, if you have one  Glasses and hearing aids (bring cases)  You can't wear contacts during surgery  Inhaler and eye drops, if you use them (tell us about these when you arrive)  CPAP machine or breathing device, if you use them  A few  personal items, if spending the night  If you have . . .  A pacemaker, ICD (cardiac defibrillator) or other implant: Bring the ID card.  An implanted stimulator: Bring the remote control.  A legal guardian: Bring a copy of the certified (court-stamped) guardianship papers.  Please remove any jewelry, including body piercings. Leave jewelry and other valuables at home.  If you're going home the day of surgery  You must have a responsible adult drive you home. They should stay with you overnight as well.  If you don't have someone to stay with you, and you aren't safe to go home alone, we may keep you overnight. Insurance often won't pay for this.  After surgery  If it's hard to control your pain or you need more pain medicine, please call your surgeon's office.  Questions?   If you have any questions for your care team, list them here: _________________________________________________________________________________________________________________________________________________________________________ ____________________________________ ____________________________________ ____________________________________  For informational purposes only. Not to replace the advice of your health care provider. Copyright   2003, 2019 [employer]. All rights reserved. Clinically reviewed by Becky Brunson MD. [employer] 571950 - REV 12/22.

## 2024-07-27 LAB
B BURGDOR IGG+IGM SER QL: 0.09
SARS-COV-2 RNA RESP QL NAA+PROBE: NEGATIVE

## 2024-12-17 ENCOUNTER — PATIENT OUTREACH (OUTPATIENT)
Dept: CARE COORDINATION | Facility: CLINIC | Age: 59
End: 2024-12-17
Payer: COMMERCIAL

## 2025-01-05 ENCOUNTER — HEALTH MAINTENANCE LETTER (OUTPATIENT)
Age: 60
End: 2025-01-05

## 2025-01-06 ENCOUNTER — HOSPITAL ENCOUNTER (EMERGENCY)
Facility: CLINIC | Age: 60
Discharge: HOME | End: 2025-01-06
Attending: NURSE PRACTITIONER
Payer: COMMERCIAL

## 2025-01-06 VITALS
WEIGHT: 169 LBS | HEIGHT: 64 IN | TEMPERATURE: 98.1 F | SYSTOLIC BLOOD PRESSURE: 148 MMHG | BODY MASS INDEX: 28.85 KG/M2 | RESPIRATION RATE: 18 BRPM | HEART RATE: 98 BPM | DIASTOLIC BLOOD PRESSURE: 91 MMHG | OXYGEN SATURATION: 99 %

## 2025-01-06 DIAGNOSIS — R51.9 ACUTE NONINTRACTABLE HEADACHE, UNSPECIFIED HEADACHE TYPE: ICD-10-CM

## 2025-01-06 DIAGNOSIS — J01.00 ACUTE MAXILLARY SINUSITIS, RECURRENCE NOT SPECIFIED: ICD-10-CM

## 2025-01-06 LAB
ALBUMIN SERPL BCG-MCNC: 4.4 G/DL (ref 3.5–5.2)
ALBUMIN UR-MCNC: NEGATIVE MG/DL
ALP SERPL-CCNC: 91 U/L (ref 40–150)
ALT SERPL W P-5'-P-CCNC: 19 U/L (ref 0–50)
ANION GAP SERPL CALCULATED.3IONS-SCNC: 13 MMOL/L (ref 7–15)
APPEARANCE UR: CLEAR
AST SERPL W P-5'-P-CCNC: 23 U/L (ref 0–45)
BASOPHILS # BLD AUTO: 0.1 10E3/UL (ref 0–0.2)
BASOPHILS NFR BLD AUTO: 1 %
BILIRUB SERPL-MCNC: 0.2 MG/DL
BILIRUB UR QL STRIP: NEGATIVE
BUN SERPL-MCNC: 7.4 MG/DL (ref 8–23)
CALCIUM SERPL-MCNC: 9.6 MG/DL (ref 8.8–10.4)
CHLORIDE SERPL-SCNC: 101 MMOL/L (ref 98–107)
COLOR UR AUTO: ABNORMAL
CREAT SERPL-MCNC: 0.66 MG/DL (ref 0.51–0.95)
EGFRCR SERPLBLD CKD-EPI 2021: >90 ML/MIN/1.73M2
EOSINOPHIL # BLD AUTO: 0.2 10E3/UL (ref 0–0.7)
EOSINOPHIL NFR BLD AUTO: 3 %
ERYTHROCYTE [DISTWIDTH] IN BLOOD BY AUTOMATED COUNT: 12.3 % (ref 10–15)
FLUAV RNA SPEC QL NAA+PROBE: NEGATIVE
FLUBV RNA RESP QL NAA+PROBE: NEGATIVE
GLUCOSE SERPL-MCNC: 103 MG/DL (ref 70–99)
GLUCOSE UR STRIP-MCNC: NEGATIVE MG/DL
HCO3 SERPL-SCNC: 23 MMOL/L (ref 22–29)
HCT VFR BLD AUTO: 40.3 % (ref 35–47)
HGB BLD-MCNC: 14.1 G/DL (ref 11.7–15.7)
HGB UR QL STRIP: ABNORMAL
IMM GRANULOCYTES # BLD: 0 10E3/UL
IMM GRANULOCYTES NFR BLD: 0 %
KETONES UR STRIP-MCNC: NEGATIVE MG/DL
LEUKOCYTE ESTERASE UR QL STRIP: NEGATIVE
LYMPHOCYTES # BLD AUTO: 2.3 10E3/UL (ref 0.8–5.3)
LYMPHOCYTES NFR BLD AUTO: 37 %
MCH RBC QN AUTO: 31.3 PG (ref 26.5–33)
MCHC RBC AUTO-ENTMCNC: 35 G/DL (ref 31.5–36.5)
MCV RBC AUTO: 90 FL (ref 78–100)
MONOCYTES # BLD AUTO: 0.6 10E3/UL (ref 0–1.3)
MONOCYTES NFR BLD AUTO: 10 %
MUCOUS THREADS #/AREA URNS LPF: PRESENT /LPF
NEUTROPHILS # BLD AUTO: 3.1 10E3/UL (ref 1.6–8.3)
NEUTROPHILS NFR BLD AUTO: 49 %
NITRATE UR QL: NEGATIVE
NRBC # BLD AUTO: 0 10E3/UL
NRBC BLD AUTO-RTO: 0 /100
PH UR STRIP: 6 [PH] (ref 5–7)
PLATELET # BLD AUTO: 270 10E3/UL (ref 150–450)
POTASSIUM SERPL-SCNC: 4.1 MMOL/L (ref 3.4–5.3)
PROT SERPL-MCNC: 7.3 G/DL (ref 6.4–8.3)
RBC # BLD AUTO: 4.5 10E6/UL (ref 3.8–5.2)
RBC URINE: 4 /HPF
RSV RNA SPEC NAA+PROBE: NEGATIVE
SARS-COV-2 RNA RESP QL NAA+PROBE: NEGATIVE
SODIUM SERPL-SCNC: 137 MMOL/L (ref 135–145)
SP GR UR STRIP: 1.02 (ref 1–1.03)
SQUAMOUS EPITHELIAL: 4 /HPF
UROBILINOGEN UR STRIP-MCNC: NORMAL MG/DL
WBC # BLD AUTO: 6.3 10E3/UL (ref 4–11)
WBC URINE: 4 /HPF

## 2025-01-06 PROCEDURE — 99284 EMERGENCY DEPT VISIT MOD MDM: CPT | Mod: 25 | Performed by: NURSE PRACTITIONER

## 2025-01-06 PROCEDURE — 99284 EMERGENCY DEPT VISIT MOD MDM: CPT | Performed by: NURSE PRACTITIONER

## 2025-01-06 PROCEDURE — 36415 COLL VENOUS BLD VENIPUNCTURE: CPT | Performed by: NURSE PRACTITIONER

## 2025-01-06 PROCEDURE — 96375 TX/PRO/DX INJ NEW DRUG ADDON: CPT | Performed by: NURSE PRACTITIONER

## 2025-01-06 PROCEDURE — 87637 SARSCOV2&INF A&B&RSV AMP PRB: CPT | Performed by: NURSE PRACTITIONER

## 2025-01-06 PROCEDURE — 82040 ASSAY OF SERUM ALBUMIN: CPT | Performed by: NURSE PRACTITIONER

## 2025-01-06 PROCEDURE — 81001 URINALYSIS AUTO W/SCOPE: CPT | Performed by: NURSE PRACTITIONER

## 2025-01-06 PROCEDURE — 96374 THER/PROPH/DIAG INJ IV PUSH: CPT | Performed by: NURSE PRACTITIONER

## 2025-01-06 PROCEDURE — 85004 AUTOMATED DIFF WBC COUNT: CPT | Performed by: NURSE PRACTITIONER

## 2025-01-06 PROCEDURE — 81001 URINALYSIS AUTO W/SCOPE: CPT | Performed by: EMERGENCY MEDICINE

## 2025-01-06 PROCEDURE — 250N000011 HC RX IP 250 OP 636: Performed by: NURSE PRACTITIONER

## 2025-01-06 PROCEDURE — 80051 ELECTROLYTE PANEL: CPT | Performed by: NURSE PRACTITIONER

## 2025-01-06 PROCEDURE — 258N000003 HC RX IP 258 OP 636: Performed by: NURSE PRACTITIONER

## 2025-01-06 PROCEDURE — 96361 HYDRATE IV INFUSION ADD-ON: CPT | Performed by: NURSE PRACTITIONER

## 2025-01-06 PROCEDURE — 84155 ASSAY OF PROTEIN SERUM: CPT | Performed by: NURSE PRACTITIONER

## 2025-01-06 PROCEDURE — 85041 AUTOMATED RBC COUNT: CPT | Performed by: NURSE PRACTITIONER

## 2025-01-06 PROCEDURE — 85018 HEMOGLOBIN: CPT | Performed by: NURSE PRACTITIONER

## 2025-01-06 RX ORDER — ONDANSETRON 2 MG/ML
4 INJECTION INTRAMUSCULAR; INTRAVENOUS ONCE
Status: COMPLETED | OUTPATIENT
Start: 2025-01-06 | End: 2025-01-06

## 2025-01-06 RX ORDER — KETOROLAC TROMETHAMINE 15 MG/ML
15 INJECTION, SOLUTION INTRAMUSCULAR; INTRAVENOUS ONCE
Status: COMPLETED | OUTPATIENT
Start: 2025-01-06 | End: 2025-01-06

## 2025-01-06 RX ADMIN — KETOROLAC TROMETHAMINE 15 MG: 15 INJECTION, SOLUTION INTRAMUSCULAR; INTRAVENOUS at 19:25

## 2025-01-06 RX ADMIN — ONDANSETRON 4 MG: 2 INJECTION, SOLUTION INTRAMUSCULAR; INTRAVENOUS at 19:24

## 2025-01-06 RX ADMIN — SODIUM CHLORIDE 1000 ML: 9 INJECTION, SOLUTION INTRAVENOUS at 19:24

## 2025-01-06 ASSESSMENT — COLUMBIA-SUICIDE SEVERITY RATING SCALE - C-SSRS
2. HAVE YOU ACTUALLY HAD ANY THOUGHTS OF KILLING YOURSELF IN THE PAST MONTH?: NO
1. IN THE PAST MONTH, HAVE YOU WISHED YOU WERE DEAD OR WISHED YOU COULD GO TO SLEEP AND NOT WAKE UP?: NO
6. HAVE YOU EVER DONE ANYTHING, STARTED TO DO ANYTHING, OR PREPARED TO DO ANYTHING TO END YOUR LIFE?: NO

## 2025-01-06 ASSESSMENT — ACTIVITIES OF DAILY LIVING (ADL)
ADLS_ACUITY_SCORE: 41
ADLS_ACUITY_SCORE: 41

## 2025-01-07 NOTE — DISCHARGE INSTRUCTIONS
Tylenol 650 mg every 4-6 hours as needed for pain.  Ibuprofen 400-600 mg every 6-8 hours as needed for pain  (take with food, stop if causing stomach pains.)  Afrin nasal spray daily if needed for any congestion or sinus pressure, do not use longer than 3 days.

## 2025-01-07 NOTE — ED PROVIDER NOTES
History     Chief Complaint   Patient presents with    Fatigue    Headache     HPI  Shelley Lew is a 59 year old female who presents for evaluation of headache, sinus pressure, and fatigue.  Symptoms started today.  She is slightly nauseated, but no vomiting.  No prior history of migraine headaches.  She was concern for possible kidney infection given her nausea and headache.  She has not taken any over-the-counter medication for her headache.    Allergies:  Allergies   Allergen Reactions    Percocet [Oxycodone-Acetaminophen] Nausea and Vomiting     Nausea vomiting and head swimming    Vicodin [Hydrocodone-Acetaminophen] Nausea and Vomiting       Problem List:    Patient Active Problem List    Diagnosis Date Noted    Current moderate episode of major depressive disorder, unspecified whether recurrent (H) 2023     Priority: Medium    Chronic hepatitis C without hepatic coma (H) 10/09/2019     Priority: Medium     Treated.  Sustained response.  Felt to be cured       Vertigo 2018     Priority: Medium    Hyperlipidemia LDL goal <130 2017     Priority: Medium    Stress incontinence of urine 2017     Priority: Medium    Spinal stenosis of lumbar region with neurogenic claudication 2017     Priority: Medium     Spine surg referral.  Many MRI findings.  Severely limited by this.    Intermittent meth use has been an issue for her, would be careful with opioids.        Tobacco use disorder 2017     Priority: Medium    CTS (carpal tunnel syndrome) 05/15/2013     Priority: Medium    CARDIOVASCULAR SCREENING; LDL GOAL LESS THAN 160 10/31/2010     Priority: Medium        Past Medical History:    Past Medical History:   Diagnosis Date     (normal spontaneous vaginal delivery)     PONV (postoperative nausea and vomiting)     Pyelonephritis, acute        Past Surgical History:    Past Surgical History:   Procedure Laterality Date    CHOLECYSTECTOMY      COLONOSCOPY N/A 2020     "Procedure: COLONOSCOPY;  Surgeon: Shawn Amor MD;  Location: [facility]    IR LUMBAR PUNCTURE  6/7/2017    LAMINECTOMY LUMBAR POSTERIOR MICROSCOPIC TWO LEVELS N/A 04/11/2017    Procedure: LAMINECTOMY LUMBAR POSTERIOR MICROSCOPIC TWO LEVELS;  Surgeon: Nehemias Bangura MD;  Location: [facility]    LAPAROSCOPY,TUBAL CAUTERY      RELEASE CARPAL TUNNEL  05/21/2013    Procedure: RELEASE CARPAL TUNNEL;  Left carpal tunnel release  ;  Surgeon: Yovani Gonzalez MD;  Location: [facility]    RELEASE CARPAL TUNNEL Right 10/15/2019    Procedure: Open Carpal Tunnel Release;  Surgeon: Yovani Gonzalez MD;  Location: [facility]       Family History:    Family History   Problem Relation Age of Onset    Cancer Sister         cervical    Cancer Sister         cervical       Social History:  Marital Status:  Single [1]  Social History     Tobacco Use    Smoking status: Light Smoker     Types: Cigarettes    Smokeless tobacco: Never    Tobacco comments:     one pack lasts a week   Vaping Use    Vaping status: Former   Substance Use Topics    Alcohol use: Not Currently     Comment: OCC., few sips of wine 9/3/19    Drug use: Not Currently     Types: Methamphetamines     Comment: smokes, not IV        Medications:    No current outpatient medications on file.        Review of Systems  As mentioned above in the history present illness. All other systems were reviewed and are negative.    Physical Exam   BP: (!) 169/111  Pulse: 113  Temp: 98.1  F (36.7  C)  Resp: 20  Height: 162.6 cm (5' 4\")  Weight: 76.7 kg (169 lb)  SpO2: 100 %      Physical Exam  Constitutional:       General: She is not in acute distress.     Appearance: She is well-developed. She is not ill-appearing.   HENT:      Head: Normocephalic and atraumatic.      Right Ear: External ear normal.      Left Ear: External ear normal.      Nose: Nose normal.      Mouth/Throat:      Mouth: Mucous membranes are moist.   Eyes:      Conjunctiva/sclera: Conjunctivae normal.   Cardiovascular:     "  Rate and Rhythm: Normal rate and regular rhythm.      Heart sounds: Normal heart sounds. No murmur heard.  Pulmonary:      Effort: Pulmonary effort is normal. No respiratory distress.      Breath sounds: Normal breath sounds.   Abdominal:      Tenderness: There is no abdominal tenderness.   Musculoskeletal:         General: Normal range of motion.   Skin:     General: Skin is warm and dry.      Findings: No rash.   Neurological:      General: No focal deficit present.      Mental Status: She is alert and oriented to person, place, and time.         ED Course        Procedures                Results for orders placed or performed during the hospital encounter of 01/06/25 (from the past 24 hours)   UA with Microscopic reflex to Culture    Specimen: Urine, Clean Catch   Result Value Ref Range    Color Urine Light Yellow Colorless, Straw, Light Yellow, Yellow    Appearance Urine Clear Clear    Glucose Urine Negative Negative mg/dL    Bilirubin Urine Negative Negative    Ketones Urine Negative Negative mg/dL    Specific Gravity Urine 1.018 1.003 - 1.035    Blood Urine Small (A) Negative    pH Urine 6.0 5.0 - 7.0    Protein Albumin Urine Negative Negative mg/dL    Urobilinogen Urine Normal Normal, 2.0 mg/dL    Nitrite Urine Negative Negative    Leukocyte Esterase Urine Negative Negative    Mucus Urine Present (A) None Seen /LPF    RBC Urine 4 (H) <=2 /HPF    WBC Urine 4 <=5 /HPF    Squamous Epithelials Urine 4 (H) <=1 /HPF    Narrative    Urine Culture not indicated   CBC with platelets differential    Narrative    The following orders were created for panel order CBC with platelets differential.  Procedure                               Abnormality         Status                     ---------                               -----------         ------                     CBC with platelets and d...[[id number]]                      Final result                 Please view results for these tests on the individual orders.    Comprehensive metabolic panel   Result Value Ref Range    Sodium 137 135 - 145 mmol/L    Potassium 4.1 3.4 - 5.3 mmol/L    Carbon Dioxide (CO2) 23 22 - 29 mmol/L    Anion Gap 13 7 - 15 mmol/L    Urea Nitrogen 7.4 (L) 8.0 - 23.0 mg/dL    Creatinine 0.66 0.51 - 0.95 mg/dL    GFR Estimate >90 >60 mL/min/1.73m2    Calcium 9.6 8.8 - 10.4 mg/dL    Chloride 101 98 - 107 mmol/L    Glucose 103 (H) 70 - 99 mg/dL    Alkaline Phosphatase 91 40 - 150 U/L    AST 23 0 - 45 U/L    ALT 19 0 - 50 U/L    Protein Total 7.3 6.4 - 8.3 g/dL    Albumin 4.4 3.5 - 5.2 g/dL    Bilirubin Total 0.2 <=1.2 mg/dL   CBC with platelets and differential   Result Value Ref Range    WBC Count 6.3 4.0 - 11.0 10e3/uL    RBC Count 4.50 3.80 - 5.20 10e6/uL    Hemoglobin 14.1 11.7 - 15.7 g/dL    Hematocrit 40.3 35.0 - 47.0 %    MCV 90 78 - 100 fL    MCH 31.3 26.5 - 33.0 pg    MCHC 35.0 31.5 - 36.5 g/dL    RDW 12.3 10.0 - 15.0 %    Platelet Count 270 150 - 450 10e3/uL    % Neutrophils 49 %    % Lymphocytes 37 %    % Monocytes 10 %    % Eosinophils 3 %    % Basophils 1 %    % Immature Granulocytes 0 %    NRBCs per 100 WBC 0 <1 /100    Absolute Neutrophils 3.1 1.6 - 8.3 10e3/uL    Absolute Lymphocytes 2.3 0.8 - 5.3 10e3/uL    Absolute Monocytes 0.6 0.0 - 1.3 10e3/uL    Absolute Eosinophils 0.2 0.0 - 0.7 10e3/uL    Absolute Basophils 0.1 0.0 - 0.2 10e3/uL    Absolute Immature Granulocytes 0.0 <=0.4 10e3/uL    Absolute NRBCs 0.0 10e3/uL   Influenza A/B, RSV and SARS-CoV2 PCR (COVID-19) Nasopharyngeal    Specimen: Nasopharyngeal; Swab   Result Value Ref Range    Influenza A PCR Negative Negative    Influenza B PCR Negative Negative    RSV PCR Negative Negative    SARS CoV2 PCR Negative Negative    Narrative    Testing was performed using the Xpert Xpress CoV2/Flu/RSV Assay on the [employer] Instrument. This test should be ordered for the detection of SARS-CoV2, influenza, and RSV viruses in individuals with signs and symptoms of respiratory tract  infection. This test is for in vitro diagnostic use under the [place] FDA for laboratories certified under CLIA to perform high or moderate complexity testing. This test has been [place] FDA cleared. A negative result does not rule out the presence of PCR inhibitors in the specimen or target RNA in concentration below the limit of detection for the assay. If only one viral target is positive but coinfection with multiple targets is suspected, the sample should be re-tested with another FDA cleared, approved, or authorized test, if coninfection would change clinical management. This test was validated by the [facility]. These laboratories are certified under the Clinical Laboratory Improvement Amendments of 1988 (CLIA-88) as qualified to perfom high complexity laboratory testing.       Medications   sodium chloride 0.9% BOLUS 1,000 mL (0 mLs Intravenous Stopped 1/6/25 2026)   ketorolac (TORADOL) injection 15 mg (15 mg Intravenous $Given 1/6/25 1925)   ondansetron (ZOFRAN) injection 4 mg (4 mg Intravenous $Given 1/6/25 1924)       Assessments & Plan (with Medical Decision Making)     59-year-old female with fatigue, headache, and sinus pressure.  No concerning exam findings.  COVID-19/influenza/RSV are negative.  No concerning lab findings.  Patient given IV normal saline 1 L bolus, IV Zofran, and IV Toradol.  Her headache almost completely resolved.  She feels well for discharge home.  This is likely migraine headache, but also considered possible early sinusitis.  Patient instructed to recheck for any worsening symptoms.    There are no discharge medications for this patient.      Final diagnoses:   Acute maxillary sinusitis, recurrence not specified   Acute nonintractable headache, unspecified headache type       1/6/2025   [facility] EMERGENCY DEPT       Armando, ROBERTA Valenzuela CNP  01/06/25 0530

## 2025-01-07 NOTE — ED NOTES
Pt reports that she has been experiencing a headache and fatigue since last week that is not getting any better.

## 2025-01-07 NOTE — ED TRIAGE NOTES
"Patient concerned about a kidney infections, says she has nausea, headache, fatigue, \"eyeball pain\".         "

## 2025-01-27 ENCOUNTER — HOSPITAL ENCOUNTER (OUTPATIENT)
Dept: MAMMOGRAPHY | Facility: CLINIC | Age: 60
Discharge: HOME | End: 2025-01-27
Attending: PHYSICIAN ASSISTANT | Admitting: PHYSICIAN ASSISTANT
Payer: COMMERCIAL

## 2025-01-27 DIAGNOSIS — Z12.31 VISIT FOR SCREENING MAMMOGRAM: ICD-10-CM

## 2025-01-27 PROCEDURE — 77063 BREAST TOMOSYNTHESIS BI: CPT

## 2025-02-04 PROBLEM — F19.11 H/O DRUG ABUSE (H): Status: ACTIVE | Noted: 2017-07-06

## 2025-03-31 ENCOUNTER — TELEPHONE (OUTPATIENT)
Dept: FAMILY MEDICINE | Facility: OTHER | Age: 60
End: 2025-03-31

## 2025-03-31 ENCOUNTER — OFFICE VISIT (OUTPATIENT)
Dept: FAMILY MEDICINE | Facility: OTHER | Age: 60
End: 2025-03-31
Payer: COMMERCIAL

## 2025-03-31 VITALS
HEIGHT: 64 IN | OXYGEN SATURATION: 95 % | TEMPERATURE: 98.7 F | WEIGHT: 174 LBS | DIASTOLIC BLOOD PRESSURE: 78 MMHG | HEART RATE: 96 BPM | RESPIRATION RATE: 16 BRPM | SYSTOLIC BLOOD PRESSURE: 120 MMHG | BODY MASS INDEX: 29.71 KG/M2

## 2025-03-31 DIAGNOSIS — Z01.818 PREOP GENERAL PHYSICAL EXAM: Primary | ICD-10-CM

## 2025-03-31 DIAGNOSIS — F17.200 NICOTINE DEPENDENCE, UNCOMPLICATED, UNSPECIFIED NICOTINE PRODUCT TYPE: ICD-10-CM

## 2025-03-31 DIAGNOSIS — R11.0 NAUSEA: ICD-10-CM

## 2025-03-31 DIAGNOSIS — M65.30 TRIGGER FINGER, ACQUIRED: ICD-10-CM

## 2025-03-31 DIAGNOSIS — Z87.891 HISTORY OF TOBACCO USE, PRESENTING HAZARDS TO HEALTH: ICD-10-CM

## 2025-03-31 PROCEDURE — 99214 OFFICE O/P EST MOD 30 MIN: CPT | Performed by: PHYSICIAN ASSISTANT

## 2025-03-31 PROCEDURE — 3078F DIAST BP <80 MM HG: CPT | Performed by: PHYSICIAN ASSISTANT

## 2025-03-31 PROCEDURE — 99406 BEHAV CHNG SMOKING 3-10 MIN: CPT | Performed by: PHYSICIAN ASSISTANT

## 2025-03-31 PROCEDURE — 3074F SYST BP LT 130 MM HG: CPT | Performed by: PHYSICIAN ASSISTANT

## 2025-03-31 PROCEDURE — 1125F AMNT PAIN NOTED PAIN PRSNT: CPT | Performed by: PHYSICIAN ASSISTANT

## 2025-03-31 RX ORDER — ONDANSETRON 4 MG/1
2-4 TABLET, FILM COATED ORAL EVERY 8 HOURS PRN
Qty: 15 TABLET | Refills: 0 | Status: SHIPPED | OUTPATIENT
Start: 2025-03-31

## 2025-03-31 RX ORDER — VARENICLINE TARTRATE 0.5 (11)-1
KIT ORAL
Qty: 53 TABLET | Refills: 0 | Status: SHIPPED | OUTPATIENT
Start: 2025-03-31

## 2025-03-31 ASSESSMENT — PATIENT HEALTH QUESTIONNAIRE - PHQ9
SUM OF ALL RESPONSES TO PHQ QUESTIONS 1-9: 5
SUM OF ALL RESPONSES TO PHQ QUESTIONS 1-9: 5
10. IF YOU CHECKED OFF ANY PROBLEMS, HOW DIFFICULT HAVE THESE PROBLEMS MADE IT FOR YOU TO DO YOUR WORK, TAKE CARE OF THINGS AT HOME, OR GET ALONG WITH OTHER PEOPLE: SOMEWHAT DIFFICULT

## 2025-03-31 ASSESSMENT — PAIN SCALES - GENERAL: PAINLEVEL_OUTOF10: MODERATE PAIN (4)

## 2025-03-31 NOTE — TELEPHONE ENCOUNTER
Please call patient and apologize as I forgot to give her the shingles today at the end of her visit. If she would still like to do this please help her reserve a time with the FLOAT nurse.     Please fax pre-op note from March 31, 2025 to [facility] - [phone]     Thanks,  Ashvin Sherman PA-C on 3/31/2025 at 10:53 AM

## 2025-03-31 NOTE — PROGRESS NOTES
Preoperative Evaluation  [facility]  [address]  [place] [address]  Phone: [phone]  Fax: [phone]  Primary Provider: Ashvin Sherman PA-C  Pre-op Performing Provider: Ashvin Sherman PA-C  Mar 31, 2025         3/28/2025   Surgical Information   What procedure is being done? Trigger finger   Facility or Hospital where procedure/surgery will be performed: [facility]   Who is doing the procedure / surgery? Missy kerry   Date of surgery / procedure: 04 14 2025   Time of surgery / procedure: 2pm   Where do you plan to recover after surgery? at home alone     Fax number for surgical facility: [facility] - [phone]    Assessment & Plan     The proposed surgical procedure is considered INTERMEDIATE risk.    Preop general physical exam  Trigger finger, acquired  Patient was provided with instruction on preparation for the upcoming procedure. A copy of these instructions were attached to the AVS for the patient to review at home.    History of tobacco use, presenting hazards to health  Patient had used chantix in the past for cessation and felt that it was helpful. However, after stopping the medication she had restarted tobacco use. She would like to try this medication again. We reviewed strategies for use and to work to find alternative activities to perform when she would have otherwise smoked.   - varenicline (CHANTIX SANJU) 0.5 MG X 11 & 1 MG X 42 tablet; Take 0.5 mg tab daily for 3 days, THEN 0.5 mg tab twice daily for 4 days, THEN 1 mg twice daily.  - SMOKING CESSATION COUNSELING 3-10 MIN    Nicotine dependence, uncomplicated, unspecified nicotine product type  See above. Patient agreed to start chantix.   - SMOKING CESSATION COUNSELING 3-10 MIN    Nausea  Patient reports history of nausea during the day. She will have zofran to use as needed for this.   - ondansetron (ZOFRAN) 4 MG tablet; Take 0.5-1 tablets (2-4 mg) by mouth every 8 hours as  needed for nausea.    Risks and Recommendations  The patient has the following additional risks and recommendations for perioperative complications:   - No identified additional risk factors other than previously addressed    Antiplatelet or Anticoagulation Medication Instructions   - We reviewed the medication list and the patient is not on an antiplatelet or anticoagulation medications.    Additional Medication Instructions   - Herbal medications and vitamins: DO NOT TAKE 14 days prior to surgery.   - Topicals: DO NOT TAKE day of surgery.    Recommendation  Approval given to proceed with proposed procedure, without further diagnostic evaluation.    Rosalie Beavers is a 60 year old, presenting for the following:  Pre-Op Exam          3/31/2025    10:07 AM   Additional Questions   Roomed by DONNA Chavez   Accompanied by Self     HPI: Patient has been working with Dr. Ramón Portillo, to address her left middle trigger finger. Scheduled for surgical correction on 04/18/25.         3/28/2025   Pre-Op Questionnaire   Have you ever had a heart attack or stroke? No   Have you ever had surgery on your heart or blood vessels, such as a stent placement, a coronary artery bypass, or surgery on an artery in your head, neck, heart, or legs? No   Do you have chest pain with activity? No   Do you have a history of heart failure? No   Do you currently have a cold, bronchitis or symptoms of other infection? No   Do you have a cough, shortness of breath, or wheezing? No   Do you or anyone in your family have previous history of blood clots? No   Do you or does anyone in your family have a serious bleeding problem such as prolonged bleeding following surgeries or cuts? No   Have you ever had problems with anemia or been told to take iron pills? No   Have you had any abnormal blood loss such as black, tarry or bloody stools, or abnormal vaginal bleeding? No   Have you ever had a blood transfusion? No   Are you willing to have a  blood transfusion if it is medically needed before, during, or after your surgery? Yes   Have you or any of your relatives ever had problems with anesthesia? No   Do you have sleep apnea, excessive snoring or daytime drowsiness? No   Do you have any artifical heart valves or other implanted medical devices like a pacemaker, defibrillator, or continuous glucose monitor? No   Do you have artificial joints? No   Are you allergic to latex? No     Health Care Directive  Patient does not have a Health Care Directive: Discussed advance care planning with patient; however, patient declined at this time.    Preoperative Review of    reviewed - no record of controlled substances prescribed.    Status of Chronic Conditions:  See problem list for active medical problems.  Problems all longstanding and stable, except as noted/documented.  See ROS for pertinent symptoms related to these conditions.    Patient Active Problem List    Diagnosis Date Noted    Current moderate episode of major depressive disorder, unspecified whether recurrent (H) 12/06/2023     Priority: Medium    Chronic hepatitis C without hepatic coma (H) 10/09/2019     Priority: Medium     Treated.  Sustained response.  Felt to be cured       Vertigo 06/23/2018     Priority: Medium    Hyperlipidemia LDL goal <130 07/06/2017     Priority: Medium    H/O drug abuse (H) 07/06/2017     Priority: Medium     Meth use in past.  One month sober as of 7/6/17 visit      Stress incontinence of urine 03/28/2017     Priority: Medium    Spinal stenosis of lumbar region with neurogenic claudication 02/16/2017     Priority: Medium     Spine surg referral.  Many MRI findings.  Severely limited by this.    Intermittent meth use has been an issue for her, would be careful with opioids.        Tobacco use disorder 02/16/2017     Priority: Medium    CTS (carpal tunnel syndrome) 05/15/2013     Priority: Medium    CARDIOVASCULAR SCREENING; LDL GOAL LESS THAN 160 10/31/2010      "Priority: Medium      Past Medical History:   Diagnosis Date     (normal spontaneous vaginal delivery)     X2    PONV (postoperative nausea and vomiting)     Pyelonephritis, acute      Past Surgical History:   Procedure Laterality Date    CHOLECYSTECTOMY      COLONOSCOPY N/A 2020    Procedure: COLONOSCOPY;  Surgeon: Shawn Amor MD;  Location: [facility]    IR LUMBAR PUNCTURE  2017    LAMINECTOMY LUMBAR POSTERIOR MICROSCOPIC TWO LEVELS N/A 2017    Procedure: LAMINECTOMY LUMBAR POSTERIOR MICROSCOPIC TWO LEVELS;  Surgeon: Nehemias Bangura MD;  Location: [facility]    LAPAROSCOPY,TUBAL CAUTERY      RELEASE CARPAL TUNNEL  2013    Procedure: RELEASE CARPAL TUNNEL;  Left carpal tunnel release  ;  Surgeon: Yovani Gonzalez MD;  Location: [facility]    RELEASE CARPAL TUNNEL Right 10/15/2019    Procedure: Open Carpal Tunnel Release;  Surgeon: Yovani Gonzalez MD;  Location: [facility]     No current outpatient medications on file.       Allergies   Allergen Reactions    Percocet [Oxycodone-Acetaminophen] Nausea and Vomiting     Nausea vomiting and head swimming    Vicodin [Hydrocodone-Acetaminophen] Nausea and Vomiting        Social History     Tobacco Use    Smoking status: Light Smoker     Current packs/day: 1.00     Types: Cigarettes    Smokeless tobacco: Never    Tobacco comments:     one pack lasts a week   Substance Use Topics    Alcohol use: Not Currently     Comment: OCC., few sips of wine 9/3/19     Family History   Problem Relation Age of Onset    Cancer Sister         cervical    Cancer Sister         cervical     History   Drug Use Unknown     Comment: smokes, not IV             Review of Systems  Constitutional, HEENT, cardiovascular, pulmonary, gi and gu systems are negative, except as otherwise noted.    Objective    /78   Pulse 96   Temp 98.7  F (37.1  C) (Temporal)   Resp 16   Ht 1.626 m (5' 4\")   Wt 78.9 kg (174 lb)   LMP 2016 (Approximate)   SpO2 95%   BMI 29.87 kg/m  " "   Estimated body mass index is 29.87 kg/m  as calculated from the following:    Height as of this encounter: 1.626 m (5' 4\").    Weight as of this encounter: 78.9 kg (174 lb).  Physical Exam  GENERAL: alert and no distress  EYES: Eyes grossly normal to inspection, PERRL and conjunctivae and sclerae normal  HENT: ear canals and TM's normal, nose and mouth without ulcers or lesions  NECK: no adenopathy, no asymmetry, masses, or scars  RESP: lungs clear to auscultation - no rales, rhonchi or wheezes  CV: regular rate and rhythm, normal S1 S2, no S3 or S4, no murmur, click or rub, no peripheral edema  MS: no gross musculoskeletal defects noted, no edema  NEURO: Normal strength and tone, mentation intact and speech normal  PSYCH: mentation appears normal, affect normal/bright    Recent Labs   Lab Test 01/06/25  1922 07/26/24  1044   HGB 14.1  --      --    INR  --  0.98    143   POTASSIUM 4.1 4.3   CR 0.66 0.76        Diagnostics      Latest Reference Range & Units 01/06/25 19:22   Sodium 135 - 145 mmol/L 137   Potassium 3.4 - 5.3 mmol/L 4.1   Chloride 98 - 107 mmol/L 101   Carbon Dioxide (CO2) 22 - 29 mmol/L 23   Urea Nitrogen 8.0 - 23.0 mg/dL 7.4 (L)   Creatinine 0.51 - 0.95 mg/dL 0.66   GFR Estimate >60 mL/min/1.73m2 >90   Calcium 8.8 - 10.4 mg/dL 9.6   Anion Gap 7 - 15 mmol/L 13   Albumin 3.5 - 5.2 g/dL 4.4   Protein Total 6.4 - 8.3 g/dL 7.3   Alkaline Phosphatase 40 - 150 U/L 91   ALT 0 - 50 U/L 19   AST 0 - 45 U/L 23   Bilirubin Total <=1.2 mg/dL 0.2   Glucose 70 - 99 mg/dL 103 (H)   WBC 4.0 - 11.0 10e3/uL 6.3   Hemoglobin 11.7 - 15.7 g/dL 14.1   Hematocrit 35.0 - 47.0 % 40.3   Platelet Count 150 - 450 10e3/uL 270   RBC Count 3.80 - 5.20 10e6/uL 4.50   MCV 78 - 100 fL 90   MCH 26.5 - 33.0 pg 31.3   MCHC 31.5 - 36.5 g/dL 35.0   RDW 10.0 - 15.0 % 12.3   % Neutrophils % 49   % Lymphocytes % 37   % Monocytes % 10   % Eosinophils % 3   % Basophils % 1   Absolute Basophils 0.0 - 0.2 10e3/uL 0.1   Absolute " Eosinophils 0.0 - 0.7 10e3/uL 0.2   Absolute Immature Granulocytes <=0.4 10e3/uL 0.0   Absolute Lymphocytes 0.8 - 5.3 10e3/uL 2.3   Absolute Monocytes 0.0 - 1.3 10e3/uL 0.6   % Immature Granulocytes % 0   Absolute Neutrophils 1.6 - 8.3 10e3/uL 3.1   Absolute NRBCs 10e3/uL 0.0   NRBCs per 100 WBC <1 /100 0   (L): Data is abnormally low  (H): Data is abnormally high  No EKG required, no history of coronary heart disease, significant arrhythmia, peripheral arterial disease or other structural heart disease.    Revised Cardiac Risk Index (RCRI)  The patient has the following serious cardiovascular risks for perioperative complications:   - No serious cardiac risks = 0 points     RCRI Interpretation: 0 points: Class I (very low risk - 0.4% complication rate)         Signed Electronically by: Ashvin Sherman PA-C  A copy of this evaluation report is provided to the requesting physician.      Answers submitted by the patient for this visit:  Patient Health Questionnaire (Submitted on 3/31/2025)  If you checked off any problems, how difficult have these problems made it for you to do your work, take care of things at home, or get along with other people?: Somewhat difficult  PHQ9 TOTAL SCORE: 5

## 2025-03-31 NOTE — PATIENT INSTRUCTIONS
How to Take Your Medication Before Surgery  Preoperative Medication Instructions   Antiplatelet or Anticoagulation Medication Instructions   - We reviewed the medication list and the patient is not on an antiplatelet or anticoagulation medications.    Additional Medication Instructions   - Herbal medications and vitamins: DO NOT TAKE 14 days prior to surgery.       Patient Education   Preparing for Your Surgery  For Adults  Getting started  In most cases, a nurse will call to review your health history and instructions. They will give you an arrival time based on your scheduled surgery time. Please be ready to share:  Your doctor's clinic name and phone number  Your medical, surgical, and anesthesia history  A list of allergies and sensitivities  A list of medicines, including herbal treatments and over-the-counter drugs  Whether the patient has a legal guardian (ask how to send us the papers in advance)  Note: You may not receive a call if you were seen at our PAC (Preoperative Assessment Center).  Please tell us if you're pregnant--or if there's any chance you might be pregnant. Some surgeries may injure a fetus (unborn baby), so they require a pregnancy test. Surgeries that are safe for a fetus don't always need a test, and you can choose whether to have one.   Preparing for surgery  Within 10 to 30 days of surgery: Have a pre-op exam (sometimes called an H&P, or History and Physical). This can be done at a clinic or pre-operative center.  If you're having a , you may not need this exam. Talk to your care team.  At your pre-op exam, talk to your care team about all medicines you take. (This includes CBD oil and any drugs, such as THC, marijuana, and other forms of cannabis.) If you need to stop any medicine before surgery, ask when to start taking it again.  This is for your safety. Many medicines and drugs can make you bleed too much during surgery. Some change how well surgery (anesthesia) drugs  work.  Call your insurance company to let them know you're having surgery. (If you don't have insurance, call [phone].)  Call your clinic if there's any change in your health. This includes a scrape or scratch near the surgery site, or any signs of a cold (sore throat, runny nose, cough, rash, fever).  Eating and drinking guidelines  For your safety: Unless your surgeon tells you otherwise, follow the guidelines below.  Eat and drink as normal until 8 hours before you arrive for surgery. After that, no food or milk. You can spit out gum when you arrive.  Drink clear liquids until 2 hours before you arrive. These are liquids you can see through, like water, Gatorade, and Propel Water. They also include plain black coffee and tea (no cream or milk).  No alcohol for 24 hours before you arrive. The night before surgery, stop any drinks that contain THC.  If your care team tells you to take medicine on the morning of surgery, it's okay to take it with a sip of water. No other medicines or drugs are allowed (including CBD oil)--follow your care team's instructions.  If you have questions the day of surgery, call your hospital or surgery center.   Preventing infection  Shower or bathe the night before and the morning of surgery. Follow the instructions your clinic gave you. (If no instructions, use regular soap.)  Don't shave or clip hair near your surgery site. We'll remove the hair if needed.  Don't smoke or vape the morning of surgery. No chewing tobacco for 6 hours before you arrive. A nicotine patch is okay. You may spit out nicotine gum when you arrive.  For some surgeries, the surgeon will tell you to fully quit smoking and nicotine.  We will make every effort to keep you safe from infection. We will:  Clean our hands often with soap and water (or an alcohol-based hand rub).  Clean the skin at your surgery site with a special soap that kills germs.  Give you a special gown to keep you warm. (Cold raises the  risk of infection.)  Wear hair covers, masks, gowns, and gloves during surgery.  Give antibiotic medicine, if prescribed. Not all surgeries need this medicine.  What to bring on the day of surgery  Photo ID and insurance card  Copy of your health care directive, if you have one  Glasses and hearing aids (bring cases)  You can't wear contacts during surgery  Inhaler and eye drops, if you use them (tell us about these when you arrive)  CPAP machine or breathing device, if you use them  A few personal items, if spending the night  If you have . . .  A pacemaker, ICD (cardiac defibrillator), or other implant: Bring the ID card.  An implanted stimulator: Bring the remote control.  A legal guardian: Bring a copy of the certified (court-stamped) guardianship papers.  Please remove any jewelry, including body piercings. Leave jewelry and other valuables at home.  If you're going home the day of surgery  You must have a responsible adult drive you home. They should stay with you overnight as well.  If you don't have someone to stay with you, and you aren't safe to go home alone, we may keep you overnight. Insurance often won't pay for this.  After surgery  If it's hard to control your pain or you need more pain medicine, please call your surgeon's office.  Questions?   If you have any questions for your care team, list them here:   ____________________________________________________________________________________________________________________________________________________________________________________________________________________________________________________________  For informational purposes only. Not to replace the advice of your health care provider. Copyright   2003, 2019 [facility]. All rights reserved. Clinically reviewed by Daniel Bullock MD. [employer] 358344 - REV 08/24.

## 2025-03-31 NOTE — TELEPHONE ENCOUNTER
Patient called back and message was relayed.  Patient will callback to schedule when she has her planner.

## 2025-05-19 ENCOUNTER — E-VISIT (OUTPATIENT)
Dept: FAMILY MEDICINE | Facility: OTHER | Age: 60
End: 2025-05-19
Payer: COMMERCIAL

## 2025-05-19 DIAGNOSIS — N89.8 VAGINAL DISCHARGE: Primary | ICD-10-CM

## 2025-05-19 PROCEDURE — 99207 PR NON-BILLABLE SERV PER CHARTING: CPT | Performed by: PHYSICIAN ASSISTANT

## 2025-05-19 NOTE — PATIENT INSTRUCTIONS
Thank you for choosing us for your care. Given your symptoms, I would like you to do a lab-only visit to determine what is causing them.  I have placed the orders.  Please schedule an appointment with the lab right here in [facility], or call [phone].  I will let you know when the results are back and next steps to take.    Schedule a Lab Only appointment here.

## 2025-07-09 ENCOUNTER — DOCUMENTATION ONLY (OUTPATIENT)
Dept: LAB | Facility: CLINIC | Age: 60
End: 2025-07-09
Payer: COMMERCIAL

## 2025-07-09 NOTE — PROGRESS NOTES
Patient is overdue for annual preventative. I do not see that this has been scheduled. Please reach out to patient to have her schedule this and then future lab orders can potentially be placed to be drawn prior to this visit.    Thanks,  Ashvin Sherman PA-C on 7/9/2025 at 6:30 PM

## 2025-07-09 NOTE — PROGRESS NOTES
Shelley Lew has an upcoming lab appointment:    Future Appointments   Date Time Provider Department Center   7/10/2025  1:00 PM PH [facility]     Patient is scheduled for lab    There is no order available. Please review and place either future orders or HMPO (Review of Health Maintenance Protocol Orders), as appropriate.    Health Maintenance Due   Topic    LIPID      Chel Escalante     Quality 130: Documentation Of Current Medications In The Medical Record: Current Medications Documented Quality 431: Preventive Care And Screening: Unhealthy Alcohol Use - Screening: Patient not identified as an unhealthy alcohol user when screened for unhealthy alcohol use using a systematic screening method Quality 110: Preventive Care And Screening: Influenza Immunization: Influenza Immunization previously received during influenza season Quality 226: Preventive Care And Screening: Tobacco Use: Screening And Cessation Intervention: Patient screened for tobacco use and is an ex/non-smoker Detail Level: Detailed

## (undated) DEVICE — GLOVE PROTEXIS W/NEU-THERA 7.5  2D73TE75

## (undated) DEVICE — BLANKET BAIR HUGGER LOWER BODY 42568

## (undated) DEVICE — LABEL MEDICATION SYSTEM  3304

## (undated) DEVICE — GLOVE PROTEXIS W/NEU-THERA 8.5  2D73TE85

## (undated) DEVICE — BLADE KNIFE BEAVER 376700

## (undated) DEVICE — IMM ALUMI HAND XLG 761

## (undated) DEVICE — GOWN XLG DISP 9545

## (undated) DEVICE — PREP SKIN SCRUB TRAY 4461A

## (undated) DEVICE — BASIN SET MINOR DISP

## (undated) DEVICE — SU ETHILON 4-0 PS-2 18" 1667G

## (undated) DEVICE — GOWN LG DISP 9515

## (undated) DEVICE — BNDG ELASTIC 2"X5YDS UNSTERILE 6611-20

## (undated) DEVICE — DECANTER VIAL 2006S

## (undated) DEVICE — ESU CORD BIPOLAR GREEN 10-4000

## (undated) DEVICE — SOL ADH LIQUID BENZOIN SWAB 0.6ML C1544

## (undated) DEVICE — Device

## (undated) DEVICE — TAPE MEDIPORE 4"X10YD 2964

## (undated) DEVICE — SU VICRYL 2-0 OS-6 27" UND J533H

## (undated) DEVICE — GLOVE PROTEXIS BLUE W/NEU-THERA 7.0  2D73EB70

## (undated) DEVICE — STOCKING SLEEVE COMPRESSION CALF MED

## (undated) DEVICE — DRAPE MICRO ZEISS MD 48"X120CM

## (undated) DEVICE — GOWN IMPERVIOUS SPECIALTY XLG/XLONG 32474

## (undated) DEVICE — DRSG ADAPTIC 3X3" 6112

## (undated) DEVICE — SU VICRYL 0 OS-6 27" UND J534H

## (undated) DEVICE — DRAPE MAYO STAND 23X54 8337

## (undated) DEVICE — SPONGE RAY-TEC 4X8" 7318

## (undated) DEVICE — PACK HAND

## (undated) DEVICE — GLOVE PROTEXIS W/NEU-THERA 7.0  2D73TE70

## (undated) DEVICE — PREP CHLORHEXIDINE 4% 4OZ (HIBICLENS) 57504

## (undated) DEVICE — SPONGE SURGIFOAM 100 1974

## (undated) DEVICE — SOL WATER IRRIG 1000ML BOTTLE 07139-09

## (undated) DEVICE — DRSG GAUZE 4X4" TRAY

## (undated) DEVICE — BONE WAX 2.5GM W31G

## (undated) DEVICE — MIDAS REX DISSECTING TOOL  14MH30

## (undated) RX ORDER — LIDOCAINE HYDROCHLORIDE 10 MG/ML
INJECTION, SOLUTION EPIDURAL; INFILTRATION; INTRACAUDAL; PERINEURAL
Status: DISPENSED
Start: 2019-10-15

## (undated) RX ORDER — DEXAMETHASONE SODIUM PHOSPHATE 4 MG/ML
INJECTION, SOLUTION INTRA-ARTICULAR; INTRALESIONAL; INTRAMUSCULAR; INTRAVENOUS; SOFT TISSUE
Status: DISPENSED
Start: 2019-10-15

## (undated) RX ORDER — DEXAMETHASONE SODIUM PHOSPHATE 4 MG/ML
INJECTION, SOLUTION INTRA-ARTICULAR; INTRALESIONAL; INTRAMUSCULAR; INTRAVENOUS; SOFT TISSUE
Status: DISPENSED
Start: 2017-04-11

## (undated) RX ORDER — ONDANSETRON 2 MG/ML
INJECTION INTRAMUSCULAR; INTRAVENOUS
Status: DISPENSED
Start: 2019-10-15

## (undated) RX ORDER — LIDOCAINE HYDROCHLORIDE 10 MG/ML
INJECTION, SOLUTION INFILTRATION; PERINEURAL
Status: DISPENSED
Start: 2019-10-15

## (undated) RX ORDER — GLYCOPYRROLATE 0.2 MG/ML
INJECTION, SOLUTION INTRAMUSCULAR; INTRAVENOUS
Status: DISPENSED
Start: 2017-04-11

## (undated) RX ORDER — ACETAMINOPHEN 325 MG/1
TABLET ORAL
Status: DISPENSED
Start: 2019-10-15

## (undated) RX ORDER — GLYCOPYRROLATE 0.2 MG/ML
INJECTION, SOLUTION INTRAMUSCULAR; INTRAVENOUS
Status: DISPENSED
Start: 2020-08-20

## (undated) RX ORDER — LIDOCAINE HYDROCHLORIDE 10 MG/ML
INJECTION, SOLUTION EPIDURAL; INFILTRATION; INTRACAUDAL; PERINEURAL
Status: DISPENSED
Start: 2017-04-11

## (undated) RX ORDER — NEOSTIGMINE METHYLSULFATE 5 MG/5 ML
SYRINGE (ML) INTRAVENOUS
Status: DISPENSED
Start: 2017-04-11

## (undated) RX ORDER — BUPIVACAINE HYDROCHLORIDE 5 MG/ML
INJECTION, SOLUTION PERINEURAL
Status: DISPENSED
Start: 2019-10-15

## (undated) RX ORDER — LIDOCAINE HYDROCHLORIDE 10 MG/ML
INJECTION, SOLUTION EPIDURAL; INFILTRATION; INTRACAUDAL; PERINEURAL
Status: DISPENSED
Start: 2020-08-20

## (undated) RX ORDER — KETOROLAC TROMETHAMINE 30 MG/ML
INJECTION, SOLUTION INTRAMUSCULAR; INTRAVENOUS
Status: DISPENSED
Start: 2017-04-11

## (undated) RX ORDER — PROPOFOL 10 MG/ML
INJECTION, EMULSION INTRAVENOUS
Status: DISPENSED
Start: 2020-08-20

## (undated) RX ORDER — ONDANSETRON 2 MG/ML
INJECTION INTRAMUSCULAR; INTRAVENOUS
Status: DISPENSED
Start: 2017-04-11

## (undated) RX ORDER — FENTANYL CITRATE 50 UG/ML
INJECTION, SOLUTION INTRAMUSCULAR; INTRAVENOUS
Status: DISPENSED
Start: 2017-04-11

## (undated) RX ORDER — PROPOFOL 10 MG/ML
INJECTION, EMULSION INTRAVENOUS
Status: DISPENSED
Start: 2017-04-11

## (undated) RX ORDER — PROPOFOL 10 MG/ML
INJECTION, EMULSION INTRAVENOUS
Status: DISPENSED
Start: 2019-10-15

## (undated) RX ORDER — CEFAZOLIN SODIUM 2 G/100ML
INJECTION, SOLUTION INTRAVENOUS
Status: DISPENSED
Start: 2019-10-15

## (undated) RX ORDER — BUPIVACAINE HYDROCHLORIDE AND EPINEPHRINE 2.5; 5 MG/ML; UG/ML
INJECTION, SOLUTION INFILTRATION; PERINEURAL
Status: DISPENSED
Start: 2017-04-11

## (undated) RX ORDER — GABAPENTIN 300 MG/1
CAPSULE ORAL
Status: DISPENSED
Start: 2019-10-15

## (undated) RX ORDER — FENTANYL CITRATE 50 UG/ML
INJECTION, SOLUTION INTRAMUSCULAR; INTRAVENOUS
Status: DISPENSED
Start: 2019-10-15